# Patient Record
Sex: MALE | Race: WHITE | Employment: OTHER | URBAN - METROPOLITAN AREA
[De-identification: names, ages, dates, MRNs, and addresses within clinical notes are randomized per-mention and may not be internally consistent; named-entity substitution may affect disease eponyms.]

---

## 2019-02-08 ENCOUNTER — HOSPITAL ENCOUNTER (INPATIENT)
Facility: CLINIC | Age: 54
LOS: 51 days | Discharge: NURSING, REHAB OR LONG-TERM CARE | DRG: 885 | End: 2019-04-02
Attending: EMERGENCY MEDICINE | Admitting: PSYCHIATRY & NEUROLOGY
Payer: COMMERCIAL

## 2019-02-08 DIAGNOSIS — E11.8 TYPE 2 DIABETES MELLITUS WITH COMPLICATION, UNSPECIFIED WHETHER LONG TERM INSULIN USE: Primary | ICD-10-CM

## 2019-02-08 DIAGNOSIS — F20.9 SCHIZOPHRENIA, UNSPECIFIED TYPE (H): ICD-10-CM

## 2019-02-08 DIAGNOSIS — I10 ESSENTIAL HYPERTENSION: ICD-10-CM

## 2019-02-08 DIAGNOSIS — E78.5 HYPERLIPIDEMIA LDL GOAL <160: ICD-10-CM

## 2019-02-08 DIAGNOSIS — R45.1 AGITATION: ICD-10-CM

## 2019-02-08 LAB
ALBUMIN SERPL-MCNC: 3.6 G/DL (ref 3.4–5)
ALP SERPL-CCNC: 74 U/L (ref 40–150)
ALT SERPL W P-5'-P-CCNC: 36 U/L (ref 0–70)
AMPHETAMINES UR QL SCN: NEGATIVE
ANION GAP SERPL CALCULATED.3IONS-SCNC: 10 MMOL/L (ref 3–14)
AST SERPL W P-5'-P-CCNC: 39 U/L (ref 0–45)
BARBITURATES UR QL: NEGATIVE
BASOPHILS # BLD AUTO: 0 10E9/L (ref 0–0.2)
BASOPHILS NFR BLD AUTO: 0.3 %
BENZODIAZ UR QL: NEGATIVE
BILIRUB SERPL-MCNC: 0.2 MG/DL (ref 0.2–1.3)
BUN SERPL-MCNC: 7 MG/DL (ref 7–30)
CALCIUM SERPL-MCNC: 8.5 MG/DL (ref 8.5–10.1)
CANNABINOIDS UR QL SCN: NEGATIVE
CHLORIDE SERPL-SCNC: 108 MMOL/L (ref 94–109)
CO2 SERPL-SCNC: 22 MMOL/L (ref 20–32)
COCAINE UR QL: NEGATIVE
CREAT SERPL-MCNC: 0.68 MG/DL (ref 0.66–1.25)
DIFFERENTIAL METHOD BLD: ABNORMAL
EOSINOPHIL # BLD AUTO: 0 10E9/L (ref 0–0.7)
EOSINOPHIL NFR BLD AUTO: 0 %
ERYTHROCYTE [DISTWIDTH] IN BLOOD BY AUTOMATED COUNT: 13 % (ref 10–15)
ETHANOL UR QL SCN: NEGATIVE
GFR SERPL CREATININE-BSD FRML MDRD: >90 ML/MIN/{1.73_M2}
GLUCOSE BLDC GLUCOMTR-MCNC: 144 MG/DL (ref 70–99)
GLUCOSE SERPL-MCNC: 129 MG/DL (ref 70–99)
HCT VFR BLD AUTO: 38 % (ref 40–53)
HGB BLD-MCNC: 12.6 G/DL (ref 13.3–17.7)
IMM GRANULOCYTES # BLD: 0.1 10E9/L (ref 0–0.4)
IMM GRANULOCYTES NFR BLD: 0.4 %
LYMPHOCYTES # BLD AUTO: 3.8 10E9/L (ref 0.8–5.3)
LYMPHOCYTES NFR BLD AUTO: 30.9 %
MCH RBC QN AUTO: 30.1 PG (ref 26.5–33)
MCHC RBC AUTO-ENTMCNC: 33.2 G/DL (ref 31.5–36.5)
MCV RBC AUTO: 91 FL (ref 78–100)
MONOCYTES # BLD AUTO: 1.1 10E9/L (ref 0–1.3)
MONOCYTES NFR BLD AUTO: 9.1 %
NEUTROPHILS # BLD AUTO: 7.3 10E9/L (ref 1.6–8.3)
NEUTROPHILS NFR BLD AUTO: 59.3 %
NRBC # BLD AUTO: 0 10*3/UL
NRBC BLD AUTO-RTO: 0 /100
OPIATES UR QL SCN: NEGATIVE
PLATELET # BLD AUTO: 357 10E9/L (ref 150–450)
POTASSIUM SERPL-SCNC: 3.8 MMOL/L (ref 3.4–5.3)
PROT SERPL-MCNC: 7 G/DL (ref 6.8–8.8)
RBC # BLD AUTO: 4.19 10E12/L (ref 4.4–5.9)
SODIUM SERPL-SCNC: 140 MMOL/L (ref 133–144)
WBC # BLD AUTO: 12.3 10E9/L (ref 4–11)

## 2019-02-08 PROCEDURE — 85025 COMPLETE CBC W/AUTO DIFF WBC: CPT | Performed by: EMERGENCY MEDICINE

## 2019-02-08 PROCEDURE — A9270 NON-COVERED ITEM OR SERVICE: HCPCS | Mod: GY | Performed by: EMERGENCY MEDICINE

## 2019-02-08 PROCEDURE — 25000132 ZZH RX MED GY IP 250 OP 250 PS 637: Mod: GY | Performed by: EMERGENCY MEDICINE

## 2019-02-08 PROCEDURE — 80053 COMPREHEN METABOLIC PANEL: CPT | Performed by: EMERGENCY MEDICINE

## 2019-02-08 PROCEDURE — 90791 PSYCH DIAGNOSTIC EVALUATION: CPT

## 2019-02-08 PROCEDURE — 00000146 ZZHCL STATISTIC GLUCOSE BY METER IP

## 2019-02-08 PROCEDURE — 99285 EMERGENCY DEPT VISIT HI MDM: CPT | Mod: 25 | Performed by: EMERGENCY MEDICINE

## 2019-02-08 PROCEDURE — 99285 EMERGENCY DEPT VISIT HI MDM: CPT | Mod: Z6 | Performed by: EMERGENCY MEDICINE

## 2019-02-08 PROCEDURE — 80307 DRUG TEST PRSMV CHEM ANLYZR: CPT | Performed by: PSYCHIATRY & NEUROLOGY

## 2019-02-08 PROCEDURE — 80320 DRUG SCREEN QUANTALCOHOLS: CPT | Performed by: PSYCHIATRY & NEUROLOGY

## 2019-02-08 RX ORDER — CITALOPRAM HYDROBROMIDE 10 MG/1
20 TABLET ORAL DAILY
COMMUNITY
End: 2019-02-09

## 2019-02-08 RX ORDER — SIMVASTATIN 40 MG
40 TABLET ORAL AT BEDTIME
Status: ON HOLD | COMMUNITY
End: 2019-03-29

## 2019-02-08 RX ORDER — HALOPERIDOL 0.5 MG/1
0.5 TABLET ORAL DAILY
COMMUNITY
End: 2019-02-09

## 2019-02-08 RX ORDER — HALOPERIDOL 5 MG/1
5 TABLET ORAL 2 TIMES DAILY
Status: ON HOLD | COMMUNITY
End: 2019-03-29

## 2019-02-08 RX ORDER — PANTOPRAZOLE SODIUM 20 MG/1
20 TABLET, DELAYED RELEASE ORAL DAILY
Status: ON HOLD | COMMUNITY
End: 2019-08-27

## 2019-02-08 RX ORDER — IBUPROFEN 200 MG
200 TABLET ORAL EVERY 4 HOURS PRN
Status: ON HOLD | COMMUNITY
End: 2019-03-29

## 2019-02-08 RX ORDER — CHLORAL HYDRATE 500 MG
1 CAPSULE ORAL 2 TIMES DAILY
Status: ON HOLD | COMMUNITY
End: 2019-08-27

## 2019-02-08 RX ORDER — HALOPERIDOL 5 MG/1
5 TABLET ORAL ONCE
Status: COMPLETED | OUTPATIENT
Start: 2019-02-08 | End: 2019-02-08

## 2019-02-08 RX ORDER — FENOFIBRATE 160 MG/1
160 TABLET ORAL DAILY
Status: ON HOLD | COMMUNITY
End: 2019-03-29

## 2019-02-08 RX ORDER — CLOZAPINE 100 MG/1
200 TABLET ORAL EVERY MORNING
Status: ON HOLD | COMMUNITY
End: 2019-08-27

## 2019-02-08 RX ORDER — MULTIVITAMIN/IRON/FOLIC ACID 18MG-0.4MG
1 TABLET ORAL DAILY
Status: ON HOLD | COMMUNITY
End: 2019-08-27

## 2019-02-08 RX ORDER — NIACIN 500 MG/1
500 TABLET, EXTENDED RELEASE ORAL AT BEDTIME
COMMUNITY
End: 2019-06-26

## 2019-02-08 RX ORDER — CLOZAPINE 25 MG/1
50 TABLET ORAL AT BEDTIME
Status: ON HOLD | COMMUNITY
End: 2019-08-27

## 2019-02-08 RX ORDER — CLOZAPINE 100 MG/1
400 TABLET ORAL AT BEDTIME
Status: ON HOLD | COMMUNITY
End: 2019-08-27

## 2019-02-08 RX ORDER — MEDROXYPROGESTERONE ACETATE 150 MG/ML
300 INJECTION, SUSPENSION INTRAMUSCULAR
Status: ON HOLD | COMMUNITY
End: 2019-03-29

## 2019-02-08 RX ORDER — ESCITALOPRAM OXALATE 10 MG/1
20 TABLET ORAL DAILY
COMMUNITY
End: 2019-02-09

## 2019-02-08 RX ORDER — POLYETHYLENE GLYCOL 3350 17 G/17G
1 POWDER, FOR SOLUTION ORAL DAILY
Status: ON HOLD | COMMUNITY
End: 2019-08-27

## 2019-02-08 RX ORDER — ASPIRIN 81 MG
100 TABLET, DELAYED RELEASE (ENTERIC COATED) ORAL 2 TIMES DAILY
Status: ON HOLD | COMMUNITY
End: 2019-08-27

## 2019-02-08 RX ORDER — ASPIRIN 81 MG/1
81 TABLET, CHEWABLE ORAL ONCE
Status: COMPLETED | OUTPATIENT
Start: 2019-02-08 | End: 2019-02-08

## 2019-02-08 RX ORDER — PROPRANOLOL HYDROCHLORIDE 80 MG/1
80 TABLET ORAL DAILY
COMMUNITY
End: 2019-02-09

## 2019-02-08 RX ADMIN — ASPIRIN 81 MG CHEWABLE TABLET 81 MG: 81 TABLET CHEWABLE at 22:11

## 2019-02-08 RX ADMIN — HALOPERIDOL 5 MG: 5 TABLET ORAL at 18:21

## 2019-02-08 NOTE — ED NOTES
Bed: ED10  Expected date: 2/8/19  Expected time: 3:05 PM  Means of arrival: Ambulance  Comments:  [id number] 54yo male, mental health eval

## 2019-02-08 NOTE — ED NOTES
Group Home staff showed up asking about admission. Discussed with them that he would eventually be taken over to [facility] and it may be a while until any decisions have been made regarding admission or discharge. They were advised they can wait or leave. Still standing in the hallway outside room.

## 2019-02-09 LAB — GLUCOSE BLDC GLUCOMTR-MCNC: 126 MG/DL (ref 70–99)

## 2019-02-09 PROCEDURE — 25000132 ZZH RX MED GY IP 250 OP 250 PS 637: Mod: GY | Performed by: EMERGENCY MEDICINE

## 2019-02-09 PROCEDURE — 00000146 ZZHCL STATISTIC GLUCOSE BY METER IP

## 2019-02-09 PROCEDURE — A9270 NON-COVERED ITEM OR SERVICE: HCPCS | Mod: GY | Performed by: EMERGENCY MEDICINE

## 2019-02-09 RX ORDER — OLANZAPINE 10 MG/1
10 TABLET, ORALLY DISINTEGRATING ORAL ONCE
Status: COMPLETED | OUTPATIENT
Start: 2019-02-09 | End: 2019-02-09

## 2019-02-09 RX ORDER — CHOLECALCIFEROL (VITAMIN D3) 50 MCG
2000 TABLET ORAL DAILY
Status: ON HOLD | COMMUNITY
End: 2019-08-27

## 2019-02-09 RX ORDER — CLOZAPINE 100 MG/1
200 TABLET ORAL DAILY
Status: DISCONTINUED | OUTPATIENT
Start: 2019-02-09 | End: 2019-04-02 | Stop reason: HOSPADM

## 2019-02-09 RX ORDER — NIACIN 500 MG/1
500 TABLET, EXTENDED RELEASE ORAL AT BEDTIME
Status: DISCONTINUED | OUTPATIENT
Start: 2019-02-09 | End: 2019-04-02 | Stop reason: HOSPADM

## 2019-02-09 RX ORDER — FENOFIBRATE 160 MG/1
160 TABLET ORAL DAILY
Status: DISCONTINUED | OUTPATIENT
Start: 2019-02-10 | End: 2019-04-02 | Stop reason: HOSPADM

## 2019-02-09 RX ORDER — PANTOPRAZOLE SODIUM 20 MG/1
20 TABLET, DELAYED RELEASE ORAL DAILY
Status: DISCONTINUED | OUTPATIENT
Start: 2019-02-10 | End: 2019-04-02 | Stop reason: HOSPADM

## 2019-02-09 RX ORDER — SODIUM FLUORIDE 5 MG/ML
PASTE, DENTIFRICE DENTAL 2 TIMES DAILY
Status: ON HOLD | COMMUNITY
End: 2019-08-27

## 2019-02-09 RX ORDER — CLOZAPINE 25 MG/1
50 TABLET ORAL AT BEDTIME
Status: DISCONTINUED | OUTPATIENT
Start: 2019-02-09 | End: 2019-02-09

## 2019-02-09 RX ORDER — IBUPROFEN 200 MG
400 TABLET ORAL ONCE
Status: COMPLETED | OUTPATIENT
Start: 2019-02-09 | End: 2019-02-09

## 2019-02-09 RX ORDER — HALOPERIDOL 5 MG/1
2.5 TABLET ORAL DAILY
Status: ON HOLD | COMMUNITY
End: 2019-03-29

## 2019-02-09 RX ORDER — FENOFIBRATE 54 MG/1
54 TABLET ORAL DAILY
Status: DISCONTINUED | OUTPATIENT
Start: 2019-02-09 | End: 2019-02-09

## 2019-02-09 RX ORDER — ESCITALOPRAM OXALATE 10 MG/1
20 TABLET ORAL DAILY
Status: ON HOLD | COMMUNITY
End: 2019-03-29

## 2019-02-09 RX ORDER — HALOPERIDOL 0.5 MG/1
0.5 TABLET ORAL DAILY
Status: DISCONTINUED | OUTPATIENT
Start: 2019-02-09 | End: 2019-02-09

## 2019-02-09 RX ORDER — HALOPERIDOL 5 MG/1
5 TABLET ORAL 2 TIMES DAILY
Status: DISCONTINUED | OUTPATIENT
Start: 2019-02-09 | End: 2019-02-15

## 2019-02-09 RX ORDER — PROPRANOLOL HYDROCHLORIDE 80 MG/1
80 CAPSULE, EXTENDED RELEASE ORAL DAILY
Status: ON HOLD | COMMUNITY
End: 2019-03-29

## 2019-02-09 RX ORDER — PROPRANOLOL HYDROCHLORIDE 80 MG/1
80 CAPSULE, EXTENDED RELEASE ORAL DAILY
Status: DISCONTINUED | OUTPATIENT
Start: 2019-02-09 | End: 2019-04-02 | Stop reason: HOSPADM

## 2019-02-09 RX ORDER — ACETAMINOPHEN 325 MG/1
650 TABLET ORAL ONCE
Status: COMPLETED | OUTPATIENT
Start: 2019-02-09 | End: 2019-02-09

## 2019-02-09 RX ORDER — PANTOPRAZOLE SODIUM 40 MG/1
40 TABLET, DELAYED RELEASE ORAL DAILY
Status: DISCONTINUED | OUTPATIENT
Start: 2019-02-09 | End: 2019-02-09

## 2019-02-09 RX ORDER — CLOZAPINE 100 MG/1
400 TABLET ORAL AT BEDTIME
Status: DISCONTINUED | OUTPATIENT
Start: 2019-02-09 | End: 2019-02-09

## 2019-02-09 RX ORDER — SIMVASTATIN 40 MG
40 TABLET ORAL AT BEDTIME
Status: DISCONTINUED | OUTPATIENT
Start: 2019-02-09 | End: 2019-04-02 | Stop reason: HOSPADM

## 2019-02-09 RX ADMIN — Medication 10 MG: at 21:27

## 2019-02-09 RX ADMIN — METFORMIN HYDROCHLORIDE 1000 MG: 500 TABLET ORAL at 09:24

## 2019-02-09 RX ADMIN — CLOZAPINE 450 MG: 100 TABLET ORAL at 01:06

## 2019-02-09 RX ADMIN — HALOPERIDOL 5 MG: 5 TABLET ORAL at 19:50

## 2019-02-09 RX ADMIN — OLANZAPINE 10 MG: 10 TABLET, ORALLY DISINTEGRATING ORAL at 22:05

## 2019-02-09 RX ADMIN — HALOPERIDOL 5 MG: 5 TABLET ORAL at 07:00

## 2019-02-09 RX ADMIN — NIACIN 500 MG: 500 TABLET, EXTENDED RELEASE ORAL at 21:15

## 2019-02-09 RX ADMIN — SIMVASTATIN 40 MG: 40 TABLET, FILM COATED ORAL at 21:15

## 2019-02-09 RX ADMIN — ACETAMINOPHEN 650 MG: 325 TABLET, FILM COATED ORAL at 08:01

## 2019-02-09 RX ADMIN — SIMVASTATIN 40 MG: 40 TABLET, FILM COATED ORAL at 01:06

## 2019-02-09 RX ADMIN — Medication 2.5 MG: at 15:57

## 2019-02-09 RX ADMIN — Medication 10 MG: at 01:07

## 2019-02-09 RX ADMIN — CLOZAPINE 200 MG: 100 TABLET ORAL at 07:00

## 2019-02-09 RX ADMIN — METFORMIN HYDROCHLORIDE 1000 MG: 500 TABLET ORAL at 18:45

## 2019-02-09 RX ADMIN — PROPRANOLOL HYDROCHLORIDE 80 MG: 80 CAPSULE, EXTENDED RELEASE ORAL at 07:42

## 2019-02-09 RX ADMIN — NIACIN 500 MG: 500 TABLET, EXTENDED RELEASE ORAL at 01:06

## 2019-02-09 RX ADMIN — CLOZAPINE 450 MG: 100 TABLET ORAL at 21:27

## 2019-02-09 RX ADMIN — IBUPROFEN 400 MG: 200 TABLET, FILM COATED ORAL at 11:08

## 2019-02-09 RX ADMIN — FENOFIBRATE 54 MG: 54 TABLET ORAL at 07:40

## 2019-02-09 RX ADMIN — OLANZAPINE 10 MG: 10 TABLET, ORALLY DISINTEGRATING ORAL at 09:10

## 2019-02-09 RX ADMIN — PANTOPRAZOLE SODIUM 40 MG: 40 TABLET, DELAYED RELEASE ORAL at 07:43
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
## 2019-02-09 NOTE — ED NOTES
ED to Behavioral Floor Handoff    SITUATION  Brayden Adrian is a 53 year old male who speaks English and lives in a group home with others The patient arrived in the ED by ambulance from home with a complaint of Mental Health Problem (group home states patient is getting more aggressive towards staff and needs medications adjusted, called EMS. calm and cooperative with EMS/staff)  .The patient's current symptoms started/worsened 3 week(s) ago and during this time the symptoms have increased.   In the ED, pt was diagnosed with   Final diagnoses:   Agitation   Schizophrenia, unspecified type (H)        Initial vitals were: BP: 144/67  Pulse: 112  Temp: 96.8  F (36  C)  Resp: 18  SpO2: 97 %   --------  Is the patient diabetic? Yes   If yes, last blood glucose? 145     If yes, was this treated in the ED? --  --------  Is the patient inebriated (ETOH) No or Impaired on other substances? No  MSSA done? N/A  Last MSSA score: --    Were withdrawal symptoms treated? N/A  Does the patient have a seizure history? No. If yes, date of most recent seizure--  --------  Is the patient patient experiencing suicidal ideation? denies current or recent suicidal ideation     Homicidal ideation? denies current or recent homicidal ideation or behaviors.    Self-injurious behavior/urges? denies current or recent self injurious behavior or ideation.  ------  Was pt aggressive in the ED No  Was a code called No  Is the pt now cooperative? Yes  -------  Meds given in ED:   Medications   haloperidol (HALDOL) tablet 5 mg (5 mg Oral Given 2/8/19 1821)      Family present during ED course? No  Family currently present? No    BACKGROUND  Does the patient have a cognitive impairment or developmental disability? Yes  Allergies: Not on File.   Social demographics are   Social History     Socioeconomic History     Marital status: Single     Spouse name: None     Number of children: None     Years of education: None     Highest education level: None    Social Needs     Financial resource strain: None     Food insecurity - worry: None     Food insecurity - inability: None     Transportation needs - medical: None     Transportation needs - non-medical: None   Occupational History     None   Tobacco Use     Smoking status: None   Substance and Sexual Activity     Alcohol use: None     Drug use: None     Sexual activity: None   Other Topics Concern     None   Social History Narrative     None        ASSESSMENT  Labs results   Labs Ordered and Resulted from Time of ED Arrival Up to the Time of Departure from the ED   GLUCOSE BY METER - Abnormal; Notable for the following components:       Result Value    Glucose 144 (*)     All other components within normal limits   DRUG ABUSE SCREEN 6 CHEM DEP URINE ([facility])   GLUCOSE MONITOR NURSING POCT      Imaging Studies: No results found for this or any previous visit (from the past 24 hour(s)).   Most recent vital signs /67   Pulse 112   Temp 96.8  F (36  C) (Oral)   Resp 18   SpO2 97%    Abnormal labs/tests/findings requiring intervention:---   Pain control: pt had none  Nausea control: pt had none    RECOMMENDATION  Are any infection precautions needed (MRSA, VRE, etc.)? No If yes, what infection? --  ---  Does the patient have mobility issues? independently. If yes, what device does the pt use? ---  ---  Is patient on 72 hour hold or commitment? No If on 72 hour hold, have hold and rights been given to patient? N/A  Are admitting orders written if after 10 p.m. ?Yes  Tasks needing to be completed:---     Emily Hale    [phone] [facility]

## 2019-02-09 NOTE — ED NOTES
"Pt was about to transfer to [facility] when he abruptly got out the wheelchair and laid back down in bed.  Pt requesting aspirin.  Staff from  reported to this staff that this is the pattern pt has before he becomes aggressive.  Pt was asked if he would also take something to help him feel more calm and he said \"yes\".  RN informed.    "

## 2019-02-09 NOTE — ED NOTES
Pt jumped off his cart quickly and came toward the RN with hands extended like he was going to go for the throat.  MD updated that pt's AM meds should be working by now.

## 2019-02-09 NOTE — ED NOTES
Patient signed out to me by my partner awaiting psychiatric bed assignment.  Intake was able to find a bed for the patient in our hospital, however, the admitting psychiatrist requested the patient to be placed on 72-hour hold so we can get appropriate evaluation throughout his stay on the psychiatric unit.  Patient was placed on 72-hour hold.  He was also given his prescribed dose of oral gabapentin.  There were no acute events throughout my shift.     Darron Rordiguez MD  02/09/19 4056

## 2019-02-09 NOTE — PHARMACY-ADMISSION MEDICATION HISTORY
Admission medication history interview status for the 2/8/2019 admission is complete. See Epic admission navigator for allergy information, pharmacy, prior to admission medications and immunization status.     Medication history interview sources:  [facility]    Changes made to PTA medication list (reason)  Added: Diaderm foot cream, SF 5000 plus dental cream  Deleted: citalopram (uses escitalopram), carbamide peroxide ear drops (DC'd by provider)  Changed: Docusate daily-->BID, fenofibrate 54 mg --> 160 mg, fish oil 2 g --> 1 g, haloperidol 0.5 mg @1400 --> 2.5 mg @1600, depo-provera 150 mg --> 300 mg, pantoprazole 40 mg --> 20 mg, vitamin D 1000 unit(s) tablets --> vitamin D 2000 unit(s) tablets.    Additional medication history information (including reliability of information, actions taken by pharmacist):   1. Patient has received the influenza vaccine for the current flu season.   2. Patient is due for depo-provera 2/11/19.         Prior to Admission medications    Medication Sig Last Dose Taking? Auth Provider   cloZAPine (CLOZARIL) 100 MG tablet Take 200 mg by mouth every morning  2/8/2019 at AM Yes Reported, Patient   cloZAPine (CLOZARIL) 100 MG tablet Take 400 mg by mouth At Bedtime  2/7/2019 at HS Yes Reported, Patient   cloZAPine (CLOZARIL) 25 MG tablet Take 50 mg by mouth At Bedtime 2/7/2019 at HS Yes Reported, Patient   docusate sodium (COLACE) 100 MG tablet Take 100 mg by mouth 2 times daily  2/8/2019 at AM Yes Reported, Patient   escitalopram (LEXAPRO) 10 MG tablet Take 20 mg by mouth daily 2/8/2019 at AM Yes Unknown, Entered By History   fenofibrate (TRIGLIDE/LOFIBRA) 160 MG tablet Take 160 mg by mouth daily  2/7/2019 at HS Yes Reported, Patient   fish oil-omega-3 fatty acids 1000 MG capsule Take 1 g by mouth 2 times daily  2/8/2019 at AM Yes Reported, Patient   haloperidol (HALDOL) 5 MG tablet Take 2.5 mg by mouth daily At 4 pm 2/7/2019 at 1600 Yes Unknown, Entered By History   haloperidol  (HALDOL) 5 MG tablet Take 5 mg by mouth 2 times daily  2/8/2019 at AM Yes Reported, Patient   ibuprofen (ADVIL/MOTRIN) 200 MG tablet Take 200 mg by mouth every 4 hours as needed for mild pain 2/8/2019 at AM Yes Reported, Patient   medroxyPROGESTERone (DEPO-PROVERA) 150 MG/ML IM injection Inject 300 mg into the muscle every 28 days  DUE 2/11/19 Yes Reported, Patient   melatonin 5 MG tablet Take 10 mg by mouth nightly as needed for sleep 2/7/2019 at HS Yes Reported, Patient   metFORMIN (GLUCOPHAGE) 1000 MG tablet Take 1,000 mg by mouth 2 times daily (with meals) 2/8/2019 at AM Yes Reported, Patient   Multiple Vitamins-Minerals (CENTROVITE) TABS Take 1 tablet by mouth daily  2/8/2019 at AM Yes Reported, Patient   niacin ER (NIASPAN) 500 MG CR tablet Take 500 mg by mouth At Bedtime 2/7/2019 at HS Yes Reported, Patient   pantoprazole (PROTONIX) 20 MG EC tablet Take 20 mg by mouth daily  2/8/2019 at AM Yes Reported, Patient   Podiatric Products (DIADERM FOOT REJUVENATING) CREA Externally apply topically daily 2/8/2019 at AM Yes Unknown, Entered By History   polyethylene glycol (MIRALAX/GLYCOLAX) packet Take 1 packet by mouth daily 2/8/2019 at AM Yes Reported, Patient   propranolol ER (INDERAL LA) 80 MG 24 hr capsule Take 80 mg by mouth daily 2/8/2019 at AM Yes Unknown, Entered By History   simvastatin (ZOCOR) 40 MG tablet Take 40 mg by mouth At Bedtime 2/7/2019 at HS Yes Reported, Patient   Sodium Fluoride (SF 5000 PLUS) 1.1 % CREA Apply to affected area 2 times daily 2/8/2019 at AM Yes Unknown, Entered By History   vitamin D3 (CHOLECALCIFEROL) 2000 units tablet Take 1 tablet by mouth daily 2/8/2019 at AM Yes Unknown, Entered By History       Medication history completed by: Marsha Hall, PharmD

## 2019-02-10 LAB
GLUCOSE BLDC GLUCOMTR-MCNC: 160 MG/DL (ref 70–99)
GLUCOSE BLDC GLUCOMTR-MCNC: 166 MG/DL (ref 70–99)

## 2019-02-10 PROCEDURE — A9270 NON-COVERED ITEM OR SERVICE: HCPCS | Mod: GY | Performed by: PSYCHIATRY & NEUROLOGY

## 2019-02-10 PROCEDURE — 00000146 ZZHCL STATISTIC GLUCOSE BY METER IP

## 2019-02-10 PROCEDURE — 12400001 ZZH R&B MH UMMC

## 2019-02-10 PROCEDURE — 25000132 ZZH RX MED GY IP 250 OP 250 PS 637: Mod: GY | Performed by: EMERGENCY MEDICINE

## 2019-02-10 PROCEDURE — A9270 NON-COVERED ITEM OR SERVICE: HCPCS | Mod: GY | Performed by: EMERGENCY MEDICINE

## 2019-02-10 PROCEDURE — 25000132 ZZH RX MED GY IP 250 OP 250 PS 637: Performed by: PSYCHIATRY & NEUROLOGY

## 2019-02-10 RX ORDER — OLANZAPINE 10 MG/1
10 TABLET ORAL
Status: DISCONTINUED | OUTPATIENT
Start: 2019-02-10 | End: 2019-04-02 | Stop reason: HOSPADM

## 2019-02-10 RX ORDER — HYDROXYZINE HYDROCHLORIDE 25 MG/1
25-50 TABLET, FILM COATED ORAL EVERY 4 HOURS PRN
Status: DISCONTINUED | OUTPATIENT
Start: 2019-02-10 | End: 2019-04-02 | Stop reason: HOSPADM

## 2019-02-10 RX ORDER — OLANZAPINE 10 MG/2ML
10 INJECTION, POWDER, FOR SOLUTION INTRAMUSCULAR
Status: DISCONTINUED | OUTPATIENT
Start: 2019-02-10 | End: 2019-04-02 | Stop reason: HOSPADM

## 2019-02-10 RX ORDER — ALUMINA, MAGNESIA, AND SIMETHICONE 2400; 2400; 240 MG/30ML; MG/30ML; MG/30ML
30 SUSPENSION ORAL EVERY 4 HOURS PRN
Status: DISCONTINUED | OUTPATIENT
Start: 2019-02-10 | End: 2019-04-02 | Stop reason: HOSPADM

## 2019-02-10 RX ORDER — ACETAMINOPHEN 325 MG/1
650 TABLET ORAL EVERY 4 HOURS PRN
Status: DISCONTINUED | OUTPATIENT
Start: 2019-02-10 | End: 2019-04-02 | Stop reason: HOSPADM

## 2019-02-10 RX ORDER — TRAZODONE HYDROCHLORIDE 50 MG/1
50 TABLET, FILM COATED ORAL
Status: DISCONTINUED | OUTPATIENT
Start: 2019-02-10 | End: 2019-04-02 | Stop reason: HOSPADM

## 2019-02-10 RX ORDER — ESCITALOPRAM OXALATE 20 MG/1
20 TABLET ORAL DAILY
Status: DISCONTINUED | OUTPATIENT
Start: 2019-02-10 | End: 2019-04-02 | Stop reason: HOSPADM

## 2019-02-10 RX ORDER — BISACODYL 10 MG
10 SUPPOSITORY, RECTAL RECTAL DAILY PRN
Status: DISCONTINUED | OUTPATIENT
Start: 2019-02-10 | End: 2019-04-02 | Stop reason: HOSPADM

## 2019-02-10 RX ORDER — OLANZAPINE 10 MG/1
10 TABLET, ORALLY DISINTEGRATING ORAL ONCE
Status: COMPLETED | OUTPATIENT
Start: 2019-02-10 | End: 2019-02-10

## 2019-02-10 RX ADMIN — HALOPERIDOL 5 MG: 5 TABLET ORAL at 08:26

## 2019-02-10 RX ADMIN — CLOZAPINE 450 MG: 100 TABLET ORAL at 19:44

## 2019-02-10 RX ADMIN — OLANZAPINE 10 MG: 10 TABLET, ORALLY DISINTEGRATING ORAL at 09:16

## 2019-02-10 RX ADMIN — SIMVASTATIN 40 MG: 40 TABLET, FILM COATED ORAL at 19:44

## 2019-02-10 RX ADMIN — PROPRANOLOL HYDROCHLORIDE 80 MG: 80 CAPSULE, EXTENDED RELEASE ORAL at 08:30

## 2019-02-10 RX ADMIN — ESCITALOPRAM 20 MG: 20 TABLET, FILM COATED ORAL at 13:15

## 2019-02-10 RX ADMIN — Medication 2.5 MG: at 16:04

## 2019-02-10 RX ADMIN — ACETAMINOPHEN 650 MG: 325 TABLET, FILM COATED ORAL at 16:04

## 2019-02-10 RX ADMIN — METFORMIN HYDROCHLORIDE 1000 MG: 500 TABLET ORAL at 18:05

## 2019-02-10 RX ADMIN — NIACIN 500 MG: 500 TABLET, EXTENDED RELEASE ORAL at 19:45

## 2019-02-10 RX ADMIN — CLOZAPINE 200 MG: 100 TABLET ORAL at 08:22

## 2019-02-10 RX ADMIN — HALOPERIDOL 5 MG: 5 TABLET ORAL at 19:44

## 2019-02-10 RX ADMIN — PANTOPRAZOLE SODIUM 20 MG: 20 TABLET, DELAYED RELEASE ORAL at 08:28

## 2019-02-10 RX ADMIN — METFORMIN HYDROCHLORIDE 1000 MG: 500 TABLET ORAL at 08:18

## 2019-02-10 RX ADMIN — FENOFIBRATE 160 MG: 160 TABLET, FILM COATED ORAL at 08:24

## 2019-02-10 ASSESSMENT — ACTIVITIES OF DAILY LIVING (ADL)
HYGIENE/GROOMING: INDEPENDENT
TRANSFERRING: 0-->INDEPENDENT
RETIRED_EATING: 0-->INDEPENDENT
WHICH_OF_THE_ABOVE_FUNCTIONAL_RISKS_HAD_A_RECENT_ONSET_OR_CHANGE?: COGNITION
COGNITION: 2 - DIFFICULTY WITH ORGANIZING THOUGHTS
DRESS: SCRUBS (BEHAVIORAL HEALTH)
SWALLOWING: 0-->SWALLOWS FOODS/LIQUIDS WITHOUT DIFFICULTY
FALL_HISTORY_WITHIN_LAST_SIX_MONTHS: NO
RETIRED_COMMUNICATION: 0-->UNDERSTANDS/COMMUNICATES WITHOUT DIFFICULTY
DRESS: 0-->INDEPENDENT
LAUNDRY: UNABLE TO COMPLETE
TOILETING: 0-->INDEPENDENT
ORAL_HYGIENE: INDEPENDENT
BATHING: 0-->INDEPENDENT
AMBULATION: 0-->INDEPENDENT

## 2019-02-10 NOTE — PROGRESS NOTES
02/10/19 1415   Valuables   Patient Belongings locker;other (see comments)  (pt has eye glasses on his person.)   Patient Belongings Put in Hospital Secure Location (Security or Locker, etc.) clothing;glasses;other (see comments);shoes  (pt has a pair of black colored gloves and a black colored hat and a red colored scarf)   Did you bring any home meds/supplements to the hospital?  No         -items placed in pt. Locker-  1 x black colored hat  1 x grey jacket  1 x pair of black colored gloves  1 x black colored t- shirt  1 x red colored scarf  1 x pair of blue colored shoes  1 x pair of white ankle socks  1 x pair of blue jeans  1 x     -items kept with pt.-   1 x pair of eye glasses    Disclaimer- pt had no items of value or cash or identification on his person at this time.            A               Admission:  I am responsible for any personal items that are not sent to the safe or pharmacy.  [facility] is not responsible for loss, theft or damage of any property in my possession.    Signature:  _________________________________ Date: _______  Time: _____                                              Staff Signature:  ____________________________ Date: ________  Time: _____      2nd Staff person, if patient is unable/unwilling to sign:    Signature: ________________________________ Date: ________  Time: _____     Discharge:  [facility] has returned all of my personal belongings:    Signature: _________________________________ Date: ________  Time: _____                                          Staff Signature:  ____________________________ Date: ________  Time: _____

## 2019-02-10 NOTE — PROGRESS NOTES
Was able to contact Guardian Jose Adrian [phone], who gave verbal consent for treatment, via phone with two witnesses.
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
## 2019-02-11 LAB — GLUCOSE SERPL-MCNC: 195 MG/DL (ref 70–99)

## 2019-02-11 PROCEDURE — 99223 1ST HOSP IP/OBS HIGH 75: CPT | Mod: AI | Performed by: PSYCHIATRY & NEUROLOGY

## 2019-02-11 PROCEDURE — 82947 ASSAY GLUCOSE BLOOD QUANT: CPT | Performed by: PSYCHIATRY & NEUROLOGY

## 2019-02-11 PROCEDURE — 25000132 ZZH RX MED GY IP 250 OP 250 PS 637: Performed by: EMERGENCY MEDICINE

## 2019-02-11 PROCEDURE — 36415 COLL VENOUS BLD VENIPUNCTURE: CPT | Performed by: PSYCHIATRY & NEUROLOGY

## 2019-02-11 PROCEDURE — A9270 NON-COVERED ITEM OR SERVICE: HCPCS | Mod: GY | Performed by: EMERGENCY MEDICINE

## 2019-02-11 PROCEDURE — 12400001 ZZH R&B MH UMMC

## 2019-02-11 RX ADMIN — HALOPERIDOL 5 MG: 5 TABLET ORAL at 08:11

## 2019-02-11 RX ADMIN — PANTOPRAZOLE SODIUM 20 MG: 20 TABLET, DELAYED RELEASE ORAL at 08:10

## 2019-02-11 RX ADMIN — SIMVASTATIN 40 MG: 40 TABLET, FILM COATED ORAL at 19:07

## 2019-02-11 RX ADMIN — Medication 2.5 MG: at 17:19

## 2019-02-11 RX ADMIN — ESCITALOPRAM 20 MG: 20 TABLET, FILM COATED ORAL at 08:11

## 2019-02-11 RX ADMIN — PROPRANOLOL HYDROCHLORIDE 80 MG: 80 CAPSULE, EXTENDED RELEASE ORAL at 08:11

## 2019-02-11 RX ADMIN — METFORMIN HYDROCHLORIDE 1000 MG: 500 TABLET ORAL at 08:11

## 2019-02-11 RX ADMIN — FENOFIBRATE 160 MG: 160 TABLET, FILM COATED ORAL at 08:10

## 2019-02-11 RX ADMIN — CLOZAPINE 200 MG: 100 TABLET ORAL at 08:11

## 2019-02-11 RX ADMIN — METFORMIN HYDROCHLORIDE 1000 MG: 500 TABLET ORAL at 17:21

## 2019-02-11 RX ADMIN — CLOZAPINE 450 MG: 100 TABLET ORAL at 19:07

## 2019-02-11 RX ADMIN — HALOPERIDOL 5 MG: 5 TABLET ORAL at 19:06

## 2019-02-11 RX ADMIN — NIACIN 500 MG: 500 TABLET, EXTENDED RELEASE ORAL at 19:07

## 2019-02-11 ASSESSMENT — ACTIVITIES OF DAILY LIVING (ADL)
DRESS: SCRUBS (BEHAVIORAL HEALTH);INDEPENDENT
LAUNDRY: UNABLE TO COMPLETE
LAUNDRY: UNABLE TO COMPLETE
HYGIENE/GROOMING: INDEPENDENT;PROMPTS
DRESS: SCRUBS (BEHAVIORAL HEALTH)
ORAL_HYGIENE: INDEPENDENT
HYGIENE/GROOMING: INDEPENDENT
ORAL_HYGIENE: INDEPENDENT
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
## 2019-02-11 NOTE — PROGRESS NOTES
"Pt refused his blood sugar assessment this AM. Pt mostly made nonsensical statements when explaining why he didn't want to do it. Including \"I got  a half hour ago to a Pizza, so I'm good.\"  "

## 2019-02-11 NOTE — PLAN OF CARE
BEHAVIORAL TEAM DISCUSSION    Participants: AYALA Cedeño, Osbaldo rae RN, Cayetano Gore MD  Progress: Minimal, just admitted very ill  Continued Stay Criteria/Rationale: pt is psychotic, delusional, and disorganized.  Medical/Physical: per Internal Medicine consult  Precautions:   Behavioral Orders   Procedures     Assault precautions     Code 1 - Restrict to Unit     Routine Programming     As clinically indicated     Sexual precautions     Status 15     Every 15 minutes.     Status Individual Observation     Order Specific Question:   CONTINUOUS 24 hours / day     Answer:   5 feet     Order Specific Question:   Indications for SIO     Answer:   Assault risk     Suicide precautions     Patients on Suicide Precautions should have a Combination Diet ordered that includes a Diet selection(s) AND a Behavioral Tray selection for Safe Tray - with utensils, or Safe Tray - NO utensils       Plan: Psychiatric evaluation, medication management, Provide therapeutic milieu.  [facility] will work on aftercare planning.   Rationale for change in precautions or plan: No changes

## 2019-02-11 NOTE — PROGRESS NOTES
02/11/19 1524   Behavioral Health   Hallucinations denies / not responding to hallucinations   Thinking poor concentration;distractable;confused   Orientation person: oriented   Memory confabulation   Insight poor   Judgement impaired   Eye Contact at examiner   Affect blunted, flat   Mood mood is calm   Physical Appearance/Attire untidy;disheveled   Hygiene neglected grooming - unclean body, hair, teeth   Suicidality other (see comments)  (BRO)   1. Wish to be Dead (BRO)   2. Non-Specific Active Suicidal Thoughts  (BRO)   Self Injury other (see comment)  (BRO)   Elopement (none noted)   Activity withdrawn;isolative   Speech flight of ideas;pressured;rambling   Medication Sensitivity no stated side effects;no observed side effects   Psychomotor / Gait balanced;steady   Activities of Daily Living   Hygiene/Grooming independent   Oral Hygiene independent   Dress scrubs (behavioral health);independent   Laundry unable to complete   Room Organization independent     Pt remained in his room throughout the shift. During check-in, author asked about depression and anxiety, as well as SI and SIB. Pt's answers were nonsensical, often tangential and focused on Alevism topics. Pt never answered any of the questions asked of him. Pt was able to come to the door and ask for a cup of coffee, or understand a concrete question such as if he wanted any creamer with his coffee. No major concerns.

## 2019-02-12 LAB
GLUCOSE BLDC GLUCOMTR-MCNC: 164 MG/DL (ref 70–99)
GLUCOSE BLDC GLUCOMTR-MCNC: 194 MG/DL (ref 70–99)

## 2019-02-12 PROCEDURE — 25000132 ZZH RX MED GY IP 250 OP 250 PS 637: Performed by: EMERGENCY MEDICINE

## 2019-02-12 PROCEDURE — 12400001 ZZH R&B MH UMMC

## 2019-02-12 PROCEDURE — 25000132 ZZH RX MED GY IP 250 OP 250 PS 637: Performed by: PSYCHIATRY & NEUROLOGY

## 2019-02-12 PROCEDURE — 36415 COLL VENOUS BLD VENIPUNCTURE: CPT | Performed by: PSYCHIATRY & NEUROLOGY

## 2019-02-12 PROCEDURE — 00000146 ZZHCL STATISTIC GLUCOSE BY METER IP

## 2019-02-12 PROCEDURE — 80159 DRUG ASSAY CLOZAPINE: CPT | Performed by: PSYCHIATRY & NEUROLOGY

## 2019-02-12 RX ADMIN — PROPRANOLOL HYDROCHLORIDE 80 MG: 80 CAPSULE, EXTENDED RELEASE ORAL at 08:43

## 2019-02-12 RX ADMIN — PANTOPRAZOLE SODIUM 20 MG: 20 TABLET, DELAYED RELEASE ORAL at 08:44

## 2019-02-12 RX ADMIN — HALOPERIDOL 5 MG: 5 TABLET ORAL at 19:06

## 2019-02-12 RX ADMIN — CLOZAPINE 200 MG: 100 TABLET ORAL at 08:44

## 2019-02-12 RX ADMIN — METFORMIN HYDROCHLORIDE 1000 MG: 500 TABLET ORAL at 17:04

## 2019-02-12 RX ADMIN — ESCITALOPRAM 20 MG: 20 TABLET, FILM COATED ORAL at 08:44

## 2019-02-12 RX ADMIN — METFORMIN HYDROCHLORIDE 1000 MG: 500 TABLET ORAL at 08:44

## 2019-02-12 RX ADMIN — FENOFIBRATE 160 MG: 160 TABLET, FILM COATED ORAL at 08:44

## 2019-02-12 RX ADMIN — CLOZAPINE 450 MG: 100 TABLET ORAL at 19:06

## 2019-02-12 RX ADMIN — SIMVASTATIN 40 MG: 40 TABLET, FILM COATED ORAL at 19:06

## 2019-02-12 RX ADMIN — HYDROXYZINE HYDROCHLORIDE 50 MG: 25 TABLET ORAL at 17:04

## 2019-02-12 RX ADMIN — ACETAMINOPHEN 650 MG: 325 TABLET, FILM COATED ORAL at 09:25

## 2019-02-12 RX ADMIN — HALOPERIDOL 5 MG: 5 TABLET ORAL at 08:44

## 2019-02-12 RX ADMIN — OLANZAPINE 10 MG: 10 TABLET, FILM COATED ORAL at 10:12

## 2019-02-12 RX ADMIN — Medication 2.5 MG: at 17:03

## 2019-02-12 RX ADMIN — NIACIN 500 MG: 500 TABLET, EXTENDED RELEASE ORAL at 19:06

## 2019-02-12 ASSESSMENT — ACTIVITIES OF DAILY LIVING (ADL)
ORAL_HYGIENE: INDEPENDENT
ORAL_HYGIENE: INDEPENDENT
DRESS: SCRUBS (BEHAVIORAL HEALTH)
LAUNDRY: UNABLE TO COMPLETE
DRESS: SCRUBS (BEHAVIORAL HEALTH);INDEPENDENT
LAUNDRY: UNABLE TO COMPLETE
HYGIENE/GROOMING: INDEPENDENT
HYGIENE/GROOMING: INDEPENDENT

## 2019-02-12 NOTE — PROGRESS NOTES
"   02/12/19 1633   Behavioral Health   Hallucinations denies / not responding to hallucinations   Thinking poor concentration;distractable;delusional   Orientation person: oriented   Memory short term   Insight poor   Judgement impaired   Eye Contact at examiner   Affect tense;blunted, flat;irritable   Mood labile   Physical Appearance/Attire untidy;disheveled   Hygiene neglected grooming - unclean body, hair, teeth   Suicidality other (see comments)  (BRO)   1. Wish to be Dead (BRO)   2. Non-Specific Active Suicidal Thoughts  (BRO)   Self Injury other (see comment)  (BRO)   Elopement (none noted)   Activity withdrawn;isolative   Speech clear;flight of ideas;rambling   Medication Sensitivity no stated side effects;no observed side effects   Psychomotor / Gait balanced;steady   Activities of Daily Living   Hygiene/Grooming independent   Oral Hygiene independent   Dress scrubs (behavioral health);independent   Laundry unable to complete   Room Organization independent     Pt remained isolated and withdrawn. Pt had an aggressive episode today, see RN note. When asked about SI and SIB, pt spoke about things not at all related to the question. When asked about HI, pt responded \"nope\". When asked if he was having thoughts about children, pt responded \"Yup\".  "
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
## 2019-02-13 LAB — GLUCOSE BLDC GLUCOMTR-MCNC: 163 MG/DL (ref 70–99)

## 2019-02-13 PROCEDURE — 99233 SBSQ HOSP IP/OBS HIGH 50: CPT | Performed by: PSYCHIATRY & NEUROLOGY

## 2019-02-13 PROCEDURE — 25000132 ZZH RX MED GY IP 250 OP 250 PS 637: Performed by: PSYCHIATRY & NEUROLOGY

## 2019-02-13 PROCEDURE — 25000132 ZZH RX MED GY IP 250 OP 250 PS 637: Performed by: EMERGENCY MEDICINE

## 2019-02-13 PROCEDURE — 12400001 ZZH R&B MH UMMC

## 2019-02-13 PROCEDURE — 00000146 ZZHCL STATISTIC GLUCOSE BY METER IP

## 2019-02-13 RX ADMIN — PANTOPRAZOLE SODIUM 20 MG: 20 TABLET, DELAYED RELEASE ORAL at 09:22

## 2019-02-13 RX ADMIN — METFORMIN HYDROCHLORIDE 1000 MG: 500 TABLET ORAL at 09:22

## 2019-02-13 RX ADMIN — Medication 2.5 MG: at 17:16

## 2019-02-13 RX ADMIN — NIACIN 500 MG: 500 TABLET, EXTENDED RELEASE ORAL at 19:10

## 2019-02-13 RX ADMIN — PROPRANOLOL HYDROCHLORIDE 80 MG: 80 CAPSULE, EXTENDED RELEASE ORAL at 09:20

## 2019-02-13 RX ADMIN — METFORMIN HYDROCHLORIDE 1000 MG: 500 TABLET ORAL at 17:17

## 2019-02-13 RX ADMIN — FENOFIBRATE 160 MG: 160 TABLET, FILM COATED ORAL at 09:20

## 2019-02-13 RX ADMIN — ESCITALOPRAM 20 MG: 20 TABLET, FILM COATED ORAL at 09:21

## 2019-02-13 RX ADMIN — HALOPERIDOL 5 MG: 5 TABLET ORAL at 19:10

## 2019-02-13 RX ADMIN — HALOPERIDOL 5 MG: 5 TABLET ORAL at 09:21

## 2019-02-13 RX ADMIN — CLOZAPINE 450 MG: 100 TABLET ORAL at 19:10

## 2019-02-13 RX ADMIN — CLOZAPINE 200 MG: 100 TABLET ORAL at 09:21

## 2019-02-13 RX ADMIN — SIMVASTATIN 40 MG: 40 TABLET, FILM COATED ORAL at 19:10

## 2019-02-13 ASSESSMENT — ACTIVITIES OF DAILY LIVING (ADL)
DRESS: SCRUBS (BEHAVIORAL HEALTH)
ORAL_HYGIENE: INDEPENDENT
LAUNDRY: UNABLE TO COMPLETE
HYGIENE/GROOMING: INDEPENDENT;PROMPTS
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
## 2019-02-14 LAB
GLUCOSE BLDC GLUCOMTR-MCNC: 122 MG/DL (ref 70–99)
GLUCOSE BLDC GLUCOMTR-MCNC: 181 MG/DL (ref 70–99)

## 2019-02-14 PROCEDURE — 12400001 ZZH R&B MH UMMC

## 2019-02-14 PROCEDURE — 25000132 ZZH RX MED GY IP 250 OP 250 PS 637: Performed by: PSYCHIATRY & NEUROLOGY

## 2019-02-14 PROCEDURE — 25000132 ZZH RX MED GY IP 250 OP 250 PS 637: Performed by: EMERGENCY MEDICINE

## 2019-02-14 PROCEDURE — 00000146 ZZHCL STATISTIC GLUCOSE BY METER IP

## 2019-02-14 PROCEDURE — 99232 SBSQ HOSP IP/OBS MODERATE 35: CPT | Performed by: PSYCHIATRY & NEUROLOGY

## 2019-02-14 RX ADMIN — HALOPERIDOL 5 MG: 5 TABLET ORAL at 20:14

## 2019-02-14 RX ADMIN — CLOZAPINE 200 MG: 100 TABLET ORAL at 08:20

## 2019-02-14 RX ADMIN — FENOFIBRATE 160 MG: 160 TABLET, FILM COATED ORAL at 08:19

## 2019-02-14 RX ADMIN — PROPRANOLOL HYDROCHLORIDE 80 MG: 80 CAPSULE, EXTENDED RELEASE ORAL at 08:19

## 2019-02-14 RX ADMIN — HALOPERIDOL 5 MG: 5 TABLET ORAL at 08:20

## 2019-02-14 RX ADMIN — CLOZAPINE 450 MG: 100 TABLET ORAL at 20:13

## 2019-02-14 RX ADMIN — NIACIN 500 MG: 500 TABLET, EXTENDED RELEASE ORAL at 20:14

## 2019-02-14 RX ADMIN — METFORMIN HYDROCHLORIDE 1000 MG: 500 TABLET ORAL at 08:20

## 2019-02-14 RX ADMIN — METFORMIN HYDROCHLORIDE 1000 MG: 500 TABLET ORAL at 18:12

## 2019-02-14 RX ADMIN — SIMVASTATIN 40 MG: 40 TABLET, FILM COATED ORAL at 20:14

## 2019-02-14 RX ADMIN — ESCITALOPRAM 20 MG: 20 TABLET, FILM COATED ORAL at 08:20

## 2019-02-14 RX ADMIN — PANTOPRAZOLE SODIUM 20 MG: 20 TABLET, DELAYED RELEASE ORAL at 08:19

## 2019-02-14 RX ADMIN — Medication 2.5 MG: at 16:39

## 2019-02-14 ASSESSMENT — ACTIVITIES OF DAILY LIVING (ADL)
HYGIENE/GROOMING: INDEPENDENT
LAUNDRY: UNABLE TO COMPLETE
DRESS: SCRUBS (BEHAVIORAL HEALTH)
ORAL_HYGIENE: INDEPENDENT
HYGIENE/GROOMING: HANDWASHING;SHOWER;INDEPENDENT
ORAL_HYGIENE: INDEPENDENT
DRESS: SCRUBS (BEHAVIORAL HEALTH);INDEPENDENT

## 2019-02-14 NOTE — PROGRESS NOTES
sought copy of guardianship papers after calling the guardian/father and then he directed me to talk to the group home ([facility]).  The copy was then faxed to me.
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
## 2019-02-14 NOTE — PLAN OF CARE
"Pt rambles, is disorganized, apologizes frequently. He mainly stays in his room. He showered this am, allowed blood sugar, vitals and took meds. He states his mood is \"pretty good.\" He said he is \"seeing visions.\" He endorses depression and anxiety-unable to rate. Pt said he does not want lunch; he did eat bkfst. His goal for the day is \"to eat all day and night.\" His am HR was 125; no somatic concerns voiced.    1436) Pt has remained in his room today, no aggression or inappropriate sexual behavior. He remains on sio.  "

## 2019-02-15 LAB
CLOZAPINE AND METABOLITES TOTAL: 1349 NG/ML
CLOZAPINE SERPL-MCNC: 591 NG/ML
CLOZAPINE-N-OXIDE QUANT: 156 NG/ML
GLUCOSE BLDC GLUCOMTR-MCNC: 154 MG/DL (ref 70–99)
GLUCOSE BLDC GLUCOMTR-MCNC: 154 MG/DL (ref 70–99)
NORCLOZAPINE SERPL-MCNC: 602 NG/ML

## 2019-02-15 PROCEDURE — 25000132 ZZH RX MED GY IP 250 OP 250 PS 637: Performed by: EMERGENCY MEDICINE

## 2019-02-15 PROCEDURE — 99232 SBSQ HOSP IP/OBS MODERATE 35: CPT | Performed by: PSYCHIATRY & NEUROLOGY

## 2019-02-15 PROCEDURE — 00000146 ZZHCL STATISTIC GLUCOSE BY METER IP

## 2019-02-15 PROCEDURE — 12400001 ZZH R&B MH UMMC

## 2019-02-15 PROCEDURE — 25000132 ZZH RX MED GY IP 250 OP 250 PS 637: Performed by: PSYCHIATRY & NEUROLOGY

## 2019-02-15 PROCEDURE — 25000128 H RX IP 250 OP 636: Performed by: PSYCHIATRY & NEUROLOGY

## 2019-02-15 RX ORDER — MEDROXYPROGESTERONE ACETATE 150 MG/ML
100 INJECTION, SUSPENSION INTRAMUSCULAR WEEKLY
Status: DISCONTINUED | OUTPATIENT
Start: 2019-02-15 | End: 2019-02-15

## 2019-02-15 RX ORDER — ARIPIPRAZOLE 5 MG/1
5 TABLET ORAL DAILY
Status: DISCONTINUED | OUTPATIENT
Start: 2019-02-16 | End: 2019-03-04

## 2019-02-15 RX ORDER — MEDROXYPROGESTERONE ACETATE 150 MG/ML
150 INJECTION, SUSPENSION INTRAMUSCULAR
Status: DISCONTINUED | OUTPATIENT
Start: 2019-02-15 | End: 2019-04-02 | Stop reason: HOSPADM

## 2019-02-15 RX ADMIN — PANTOPRAZOLE SODIUM 20 MG: 20 TABLET, DELAYED RELEASE ORAL at 08:28

## 2019-02-15 RX ADMIN — ACETAMINOPHEN 650 MG: 325 TABLET, FILM COATED ORAL at 04:25

## 2019-02-15 RX ADMIN — HALOPERIDOL 5 MG: 5 TABLET ORAL at 08:28

## 2019-02-15 RX ADMIN — MEDROXYPROGESTERONE ACETATE 150 MG: 150 INJECTION, SUSPENSION INTRAMUSCULAR at 18:25

## 2019-02-15 RX ADMIN — METFORMIN HYDROCHLORIDE 1000 MG: 500 TABLET ORAL at 08:27

## 2019-02-15 RX ADMIN — CLOZAPINE 200 MG: 100 TABLET ORAL at 08:26

## 2019-02-15 RX ADMIN — ACETAMINOPHEN 650 MG: 325 TABLET, FILM COATED ORAL at 14:32

## 2019-02-15 RX ADMIN — METFORMIN HYDROCHLORIDE 1000 MG: 500 TABLET ORAL at 18:04

## 2019-02-15 RX ADMIN — ESCITALOPRAM 20 MG: 20 TABLET, FILM COATED ORAL at 08:28

## 2019-02-15 RX ADMIN — SIMVASTATIN 40 MG: 40 TABLET, FILM COATED ORAL at 20:04

## 2019-02-15 RX ADMIN — NIACIN 500 MG: 500 TABLET, EXTENDED RELEASE ORAL at 20:04

## 2019-02-15 RX ADMIN — HYDROXYZINE HYDROCHLORIDE 50 MG: 25 TABLET ORAL at 04:25

## 2019-02-15 RX ADMIN — FENOFIBRATE 160 MG: 160 TABLET, FILM COATED ORAL at 08:28

## 2019-02-15 RX ADMIN — PROPRANOLOL HYDROCHLORIDE 80 MG: 80 CAPSULE, EXTENDED RELEASE ORAL at 08:28

## 2019-02-15 RX ADMIN — CLOZAPINE 450 MG: 100 TABLET ORAL at 20:03

## 2019-02-15 ASSESSMENT — ACTIVITIES OF DAILY LIVING (ADL)
DRESS: SCRUBS (BEHAVIORAL HEALTH);PROMPTS
HYGIENE/GROOMING: PROMPTS;INDEPENDENT;SHOWER
HYGIENE/GROOMING: PROMPTS;INDEPENDENT
DRESS: SCRUBS (BEHAVIORAL HEALTH)
LAUNDRY: UNABLE TO COMPLETE
ORAL_HYGIENE: PROMPTS;INDEPENDENT
ORAL_HYGIENE: PROMPTS;INDEPENDENT

## 2019-02-15 NOTE — PROGRESS NOTES
"   02/14/19 2148   Behavioral Health   Hallucinations denies / not responding to hallucinations   Thinking poor concentration;delusional;distractable   Orientation place: oriented;person: oriented   Memory baseline memory   Insight poor   Judgement impaired   Eye Contact at examiner   Affect blunted, flat   Mood irritable;mood is calm   Hygiene neglected grooming - unclean body, hair, teeth   1. Wish to be Dead No   2. Non-Specific Active Suicidal Thoughts  No   Activities of Daily Living   Hygiene/Grooming independent   Oral Hygiene independent   Dress scrubs (behavioral health)   Laundry unable to complete   Room Organization independent   Patient was isolative in his room during this shift. He did not eat dinner because \"the food here is not good.\" He had some visitors this evening, and they brought him food that he ate fine without any issues. He was overall calm during this shift.  He did not shower this evening.   "

## 2019-02-16 LAB
BASOPHILS # BLD AUTO: 0.1 10E9/L (ref 0–0.2)
BASOPHILS NFR BLD AUTO: 0.4 %
DIFFERENTIAL METHOD BLD: ABNORMAL
EOSINOPHIL # BLD AUTO: 0 10E9/L (ref 0–0.7)
EOSINOPHIL NFR BLD AUTO: 0.1 %
GLUCOSE BLDC GLUCOMTR-MCNC: 116 MG/DL (ref 70–99)
GLUCOSE BLDC GLUCOMTR-MCNC: 98 MG/DL (ref 70–99)
IMM GRANULOCYTES # BLD: 0 10E9/L (ref 0–0.4)
IMM GRANULOCYTES NFR BLD: 0.3 %
LYMPHOCYTES # BLD AUTO: 4 10E9/L (ref 0.8–5.3)
LYMPHOCYTES NFR BLD AUTO: 32 %
MONOCYTES # BLD AUTO: 1.4 10E9/L (ref 0–1.3)
MONOCYTES NFR BLD AUTO: 11.1 %
NEUTROPHILS # BLD AUTO: 7 10E9/L (ref 1.6–8.3)
NEUTROPHILS NFR BLD AUTO: 56.1 %
NRBC # BLD AUTO: 0 10*3/UL
NRBC BLD AUTO-RTO: 0 /100
WBC # BLD AUTO: 12.5 10E9/L (ref 4–11)

## 2019-02-16 PROCEDURE — 25000132 ZZH RX MED GY IP 250 OP 250 PS 637: Performed by: EMERGENCY MEDICINE

## 2019-02-16 PROCEDURE — 00000146 ZZHCL STATISTIC GLUCOSE BY METER IP

## 2019-02-16 PROCEDURE — 25000132 ZZH RX MED GY IP 250 OP 250 PS 637: Performed by: PSYCHIATRY & NEUROLOGY

## 2019-02-16 PROCEDURE — 85048 AUTOMATED LEUKOCYTE COUNT: CPT | Performed by: PSYCHIATRY & NEUROLOGY

## 2019-02-16 PROCEDURE — 12400001 ZZH R&B MH UMMC

## 2019-02-16 PROCEDURE — 36416 COLLJ CAPILLARY BLOOD SPEC: CPT | Performed by: PSYCHIATRY & NEUROLOGY

## 2019-02-16 PROCEDURE — 85004 AUTOMATED DIFF WBC COUNT: CPT | Performed by: PSYCHIATRY & NEUROLOGY

## 2019-02-16 RX ADMIN — PANTOPRAZOLE SODIUM 20 MG: 20 TABLET, DELAYED RELEASE ORAL at 08:47

## 2019-02-16 RX ADMIN — ESCITALOPRAM 20 MG: 20 TABLET, FILM COATED ORAL at 08:47

## 2019-02-16 RX ADMIN — METFORMIN HYDROCHLORIDE 1000 MG: 500 TABLET ORAL at 08:47

## 2019-02-16 RX ADMIN — ARIPIPRAZOLE 5 MG: 5 TABLET ORAL at 08:47

## 2019-02-16 RX ADMIN — CLOZAPINE 450 MG: 100 TABLET ORAL at 19:27

## 2019-02-16 RX ADMIN — PROPRANOLOL HYDROCHLORIDE 80 MG: 80 CAPSULE, EXTENDED RELEASE ORAL at 08:47

## 2019-02-16 RX ADMIN — METFORMIN HYDROCHLORIDE 1000 MG: 500 TABLET ORAL at 17:52

## 2019-02-16 RX ADMIN — SIMVASTATIN 40 MG: 40 TABLET, FILM COATED ORAL at 19:27

## 2019-02-16 RX ADMIN — NIACIN 500 MG: 500 TABLET, EXTENDED RELEASE ORAL at 19:27

## 2019-02-16 RX ADMIN — FENOFIBRATE 160 MG: 160 TABLET, FILM COATED ORAL at 08:47

## 2019-02-16 RX ADMIN — CLOZAPINE 200 MG: 100 TABLET ORAL at 08:47

## 2019-02-16 ASSESSMENT — ACTIVITIES OF DAILY LIVING (ADL)
DRESS: SCRUBS (BEHAVIORAL HEALTH)
HYGIENE/GROOMING: SHOWER;INDEPENDENT
DRESS: SCRUBS (BEHAVIORAL HEALTH);INDEPENDENT
LAUNDRY: UNABLE TO COMPLETE
ORAL_HYGIENE: INDEPENDENT
HYGIENE/GROOMING: INDEPENDENT
LAUNDRY: UNABLE TO COMPLETE
ORAL_HYGIENE: INDEPENDENT

## 2019-02-16 NOTE — PROGRESS NOTES
"Pt remained in room bedrest/isolative/guarded, pt's calm/polite/controlled behavior with no outburst, pt was up early took shower/ate breakfast, this writer introduced/greet pt for brief 1:1 asked how was day? Pt stated that \" my day was not good, my dad said don't touch little baby and stare at them \" pt denies of any psych-symptoms/SI/HI, pt refused lunch asked for snack time, pt needs are offered/known.     02/16/19 1400   Behavioral Health   Thoughts/Cognition (WDL) ex   Hallucinations denies / not responding to hallucinations   Thinking distractable   Orientation person: oriented;place: oriented   Memory baseline memory   Insight denial of illness   Judgement impaired   Eye Contact at examiner   Affect/Mood (WDL) ex   Affect blunted, flat   Mood mood is calm   ADL Assessment (WDL) WDL   Physical Appearance/Attire neat   Hygiene well groomed   Suicidality (WDL) WDL   1. Wish to be Dead No   2. Non-Specific Active Suicidal Thoughts  No   Self Injury other (see comment)  (none)   Elopement (WDL) WDL   Activity (WDL) WDL   Activity isolative   Speech (WDL) WDL   Speech clear;coherent   Medication Sensitivity (WDL) WDL   Medication Sensitivity no stated side effects;no observed side effects   Psychomotor Gait (WDL) WDL   Psychomotor / Gait balanced;steady   Overt Agression (WDL) WDL   Activities of Daily Living   Hygiene/Grooming shower;independent   Oral Hygiene independent   Dress scrubs (behavioral health);independent   Laundry unable to complete     "

## 2019-02-16 NOTE — PROGRESS NOTES
Pt spent the entire shift in his room. When talking to pt, pt responded with loose associations or tangential answers, never really giving an answer to many questions. Pt was prompted to take a shower; however, pt was in the shower and had gotten wet but was still malodorous when back in his room. Pt seemed confused throughout the shift.    Pt denied SI/SIB/HI and denied any AVH. Pt seemed wary of staff, seemed paranoid, and had delusional thoughts.     02/15/19 1500   Behavioral Health   Hallucinations denies / not responding to hallucinations   Thinking confused;delusional;paranoid;poor concentration   Orientation person: oriented   Memory other (see comment)  (brooks)   Insight poor   Judgement impaired   Eye Contact at examiner   Affect blunted, flat   Mood mood is calm;anxious   Physical Appearance/Attire attire appropriate to age and situation;appears stated age;untidy;disheveled   Hygiene neglected grooming - unclean body, hair, teeth;body odor   Suicidality other (see comments)  (denied)   1. Wish to be Dead No   2. Non-Specific Active Suicidal Thoughts  No   Self Injury other (see comment)  (denied; none observed)   Elopement (no concerns)   Activity isolative;withdrawn   Speech tangential;clear;other (see comments)  (loose associations)   Medication Sensitivity no stated side effects;no observed side effects   Psychomotor / Gait balanced;steady   Activities of Daily Living   Hygiene/Grooming prompts;independent;shower   Oral Hygiene prompts;independent   Dress scrubs (behavioral health);prompts   Room Organization prompts
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
## 2019-02-16 NOTE — PROGRESS NOTES
"Unable to obtain AM blood sugar as patient had already started eating his breakfast. Checked just before lunch time instead, sugar is 98. Pt refused his lunch despite prompting by staff. Told RN writer that he couldn't eat because, \"my teeth are too crooked.\" Pt had eaten breakfast this morning without issue.   "

## 2019-02-17 LAB
GLUCOSE BLDC GLUCOMTR-MCNC: 149 MG/DL (ref 70–99)
GLUCOSE BLDC GLUCOMTR-MCNC: 173 MG/DL (ref 70–99)

## 2019-02-17 PROCEDURE — 12400001 ZZH R&B MH UMMC

## 2019-02-17 PROCEDURE — 25000132 ZZH RX MED GY IP 250 OP 250 PS 637: Performed by: PSYCHIATRY & NEUROLOGY

## 2019-02-17 PROCEDURE — 25000132 ZZH RX MED GY IP 250 OP 250 PS 637: Performed by: EMERGENCY MEDICINE

## 2019-02-17 PROCEDURE — 00000146 ZZHCL STATISTIC GLUCOSE BY METER IP

## 2019-02-17 RX ADMIN — ESCITALOPRAM 20 MG: 20 TABLET, FILM COATED ORAL at 08:21

## 2019-02-17 RX ADMIN — CLOZAPINE 450 MG: 100 TABLET ORAL at 20:16

## 2019-02-17 RX ADMIN — PROPRANOLOL HYDROCHLORIDE 80 MG: 80 CAPSULE, EXTENDED RELEASE ORAL at 08:21

## 2019-02-17 RX ADMIN — SIMVASTATIN 40 MG: 40 TABLET, FILM COATED ORAL at 20:16

## 2019-02-17 RX ADMIN — METFORMIN HYDROCHLORIDE 1000 MG: 500 TABLET ORAL at 08:21

## 2019-02-17 RX ADMIN — NIACIN 500 MG: 500 TABLET, EXTENDED RELEASE ORAL at 20:16

## 2019-02-17 RX ADMIN — OLANZAPINE 10 MG: 10 TABLET, FILM COATED ORAL at 17:13

## 2019-02-17 RX ADMIN — ARIPIPRAZOLE 5 MG: 5 TABLET ORAL at 08:21

## 2019-02-17 RX ADMIN — PANTOPRAZOLE SODIUM 20 MG: 20 TABLET, DELAYED RELEASE ORAL at 08:21

## 2019-02-17 RX ADMIN — CLOZAPINE 200 MG: 100 TABLET ORAL at 08:21

## 2019-02-17 RX ADMIN — METFORMIN HYDROCHLORIDE 1000 MG: 500 TABLET ORAL at 17:13

## 2019-02-17 RX ADMIN — FENOFIBRATE 160 MG: 160 TABLET, FILM COATED ORAL at 08:21

## 2019-02-17 ASSESSMENT — ACTIVITIES OF DAILY LIVING (ADL)
LAUNDRY: UNABLE TO COMPLETE
DRESS: SCRUBS (BEHAVIORAL HEALTH);INDEPENDENT
HYGIENE/GROOMING: INDEPENDENT
DRESS: SCRUBS (BEHAVIORAL HEALTH)
ORAL_HYGIENE: INDEPENDENT
HYGIENE/GROOMING: INDEPENDENT
ORAL_HYGIENE: INDEPENDENT

## 2019-02-17 NOTE — PROGRESS NOTES
Pt isolative to room and withdrawn from staff and peers. PT denies SI, SIB, HI, AH, VH. No behavioral concerns to report this evening. Staff will continue to monitor closely.

## 2019-02-17 NOTE — PLAN OF CARE
Pt is showing significant improvement. Pt has no evidence of suicidal thoughts or behavior. Pt has been out in the milieu more this shift. Pt has been appropriate with minimal need for redirection except for a few inappropriate comments. Pt is not exhibiting aggressive behavior this shift. Pt reported no physical health concerns or side effects from medications this shift. Pt took all medications without issue. Pt's BG reading and VS were WDL this shift.
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
## 2019-02-18 LAB
GLUCOSE BLDC GLUCOMTR-MCNC: 135 MG/DL (ref 70–99)
GLUCOSE BLDC GLUCOMTR-MCNC: 93 MG/DL (ref 70–99)

## 2019-02-18 PROCEDURE — 12400001 ZZH R&B MH UMMC

## 2019-02-18 PROCEDURE — 25000132 ZZH RX MED GY IP 250 OP 250 PS 637: Performed by: PSYCHIATRY & NEUROLOGY

## 2019-02-18 PROCEDURE — 25000132 ZZH RX MED GY IP 250 OP 250 PS 637: Performed by: EMERGENCY MEDICINE

## 2019-02-18 PROCEDURE — 00000146 ZZHCL STATISTIC GLUCOSE BY METER IP

## 2019-02-18 PROCEDURE — 99232 SBSQ HOSP IP/OBS MODERATE 35: CPT | Performed by: PSYCHIATRY & NEUROLOGY

## 2019-02-18 RX ADMIN — FENOFIBRATE 160 MG: 160 TABLET, FILM COATED ORAL at 08:35

## 2019-02-18 RX ADMIN — ESCITALOPRAM 20 MG: 20 TABLET, FILM COATED ORAL at 08:35

## 2019-02-18 RX ADMIN — METFORMIN HYDROCHLORIDE 1000 MG: 500 TABLET ORAL at 08:35

## 2019-02-18 RX ADMIN — PANTOPRAZOLE SODIUM 20 MG: 20 TABLET, DELAYED RELEASE ORAL at 08:35

## 2019-02-18 RX ADMIN — METFORMIN HYDROCHLORIDE 1000 MG: 500 TABLET ORAL at 17:30

## 2019-02-18 RX ADMIN — CLOZAPINE 200 MG: 100 TABLET ORAL at 08:35

## 2019-02-18 RX ADMIN — SIMVASTATIN 40 MG: 40 TABLET, FILM COATED ORAL at 20:12

## 2019-02-18 RX ADMIN — CLOZAPINE 450 MG: 100 TABLET ORAL at 20:12

## 2019-02-18 RX ADMIN — PROPRANOLOL HYDROCHLORIDE 80 MG: 80 CAPSULE, EXTENDED RELEASE ORAL at 08:35

## 2019-02-18 RX ADMIN — ARIPIPRAZOLE 5 MG: 5 TABLET ORAL at 08:35

## 2019-02-18 RX ADMIN — NIACIN 500 MG: 500 TABLET, EXTENDED RELEASE ORAL at 20:12

## 2019-02-18 ASSESSMENT — ACTIVITIES OF DAILY LIVING (ADL)
HYGIENE/GROOMING: PROMPTS
ORAL_HYGIENE: PROMPTS
DRESS: SCRUBS (BEHAVIORAL HEALTH)
LAUNDRY: UNABLE TO COMPLETE

## 2019-02-18 NOTE — PROGRESS NOTES
02/17/19 2100   Behavioral Health   Hallucinations denies / not responding to hallucinations   Thinking poor concentration;distractable   Orientation person: oriented;place: oriented   Memory baseline memory   Insight poor   Judgement impaired   Eye Contact at examiner   Affect blunted, flat;tense   Mood mood is calm;labile   Physical Appearance/Attire untidy;disheveled   Hygiene neglected grooming - unclean body, hair, teeth   Suicidality other (see comments)  (denies)   1. Wish to be Dead No   2. Non-Specific Active Suicidal Thoughts  No   Self Injury other (see comment)  (BRO)   Elopement (none noted)   Activity withdrawn   Speech clear;coherent   Medication Sensitivity no stated side effects;no observed side effects   Psychomotor / Gait balanced;steady   Overt Aggression Scale   Verbal Aggression 0   Aggression against Property 0   Auto-Aggression 0   Physical Aggression 1   Overt Aggression Total Score 1   Activities of Daily Living   Hygiene/Grooming independent   Oral Hygiene independent   Dress scrubs (behavioral health);independent   Laundry unable to complete   Room Organization independent     Pt remained mostly isolative to his room, but was seen in the milieu for a bit during the shift. Pt denied SI. Pt endorsed having thoughts about children, and when asked about SIB, pt responded nonsensically about Anabaptist topics. Pt did have one episode of slight aggression, see RN note.

## 2019-02-19 LAB
GLUCOSE BLDC GLUCOMTR-MCNC: 113 MG/DL (ref 70–99)
GLUCOSE BLDC GLUCOMTR-MCNC: 154 MG/DL (ref 70–99)

## 2019-02-19 PROCEDURE — 12400001 ZZH R&B MH UMMC

## 2019-02-19 PROCEDURE — 25000132 ZZH RX MED GY IP 250 OP 250 PS 637: Performed by: EMERGENCY MEDICINE

## 2019-02-19 PROCEDURE — 25000132 ZZH RX MED GY IP 250 OP 250 PS 637: Performed by: PSYCHIATRY & NEUROLOGY

## 2019-02-19 PROCEDURE — 99232 SBSQ HOSP IP/OBS MODERATE 35: CPT | Performed by: PSYCHIATRY & NEUROLOGY

## 2019-02-19 PROCEDURE — 00000146 ZZHCL STATISTIC GLUCOSE BY METER IP

## 2019-02-19 RX ADMIN — METFORMIN HYDROCHLORIDE 1000 MG: 500 TABLET ORAL at 08:53

## 2019-02-19 RX ADMIN — CLOZAPINE 200 MG: 100 TABLET ORAL at 08:52

## 2019-02-19 RX ADMIN — PANTOPRAZOLE SODIUM 20 MG: 20 TABLET, DELAYED RELEASE ORAL at 08:53

## 2019-02-19 RX ADMIN — CLOZAPINE 450 MG: 100 TABLET ORAL at 20:35

## 2019-02-19 RX ADMIN — SIMVASTATIN 40 MG: 40 TABLET, FILM COATED ORAL at 19:50

## 2019-02-19 RX ADMIN — METFORMIN HYDROCHLORIDE 1000 MG: 500 TABLET ORAL at 17:55

## 2019-02-19 RX ADMIN — ARIPIPRAZOLE 5 MG: 5 TABLET ORAL at 08:53

## 2019-02-19 RX ADMIN — ESCITALOPRAM 20 MG: 20 TABLET, FILM COATED ORAL at 08:53

## 2019-02-19 RX ADMIN — FENOFIBRATE 160 MG: 160 TABLET, FILM COATED ORAL at 08:52

## 2019-02-19 RX ADMIN — NIACIN 500 MG: 500 TABLET, EXTENDED RELEASE ORAL at 19:50

## 2019-02-19 RX ADMIN — PROPRANOLOL HYDROCHLORIDE 80 MG: 80 CAPSULE, EXTENDED RELEASE ORAL at 08:53

## 2019-02-19 ASSESSMENT — ACTIVITIES OF DAILY LIVING (ADL)
DRESS: SCRUBS (BEHAVIORAL HEALTH);INDEPENDENT;PROMPTS
ORAL_HYGIENE: INDEPENDENT;PROMPTS
LAUNDRY: UNABLE TO COMPLETE
HYGIENE/GROOMING: INDEPENDENT;PROMPTS

## 2019-02-19 NOTE — PROGRESS NOTES
Pt came out of his room to the [facility] for a [place] period time and said he was very nervous and then went back into his room the rest of the shift. He is malodorous and does not want to shower.

## 2019-02-19 NOTE — PLAN OF CARE
BEHAVIORAL TEAM DISCUSSION    Participants:   derek marcelo rn, christi coleman [facility]  Progress: Little if any progress.    He is out in the lounge a little more (he had been isolating to his room.)   He remains paranoid, delusional, and confused.    He is refusing to shower and attend to ADL's.   He is sexually preoccupied at times.  Continued Stay Criteria/Rationale: due to the above  Medical/Physical: per internal medicine  Precautions:   Behavioral Orders   Procedures    Assault precautions    Code 1 - Restrict to Unit    Routine Programming     As clinically indicated    Sexual precautions    Status 15     Every 15 minutes.    Status Individual Observation     Order Specific Question:   CONTINUOUS 24 hours / day     Answer:   5 feet     Order Specific Question:   Indications for SIO     Answer:   Assault risk    Suicide precautions     Patients on Suicide Precautions should have a Combination Diet ordered that includes a Diet selection(s) AND a Behavioral Tray selection for Safe Tray - with utensils, or Safe Tray - NO utensils       Plan: Per dr bonilla.  Takes meds; needs more time to stabilize.  He lives at [facility] group home  (state operated)   Father is guardian.     Rationale for change in precautions or plan: no change at this time.

## 2019-02-19 NOTE — PLAN OF CARE
Behavioral Disturbance  2/18/2019 2035 - No Change by Carlos Melchor, RN     Patient continues on SIO due to aggressive behaviors towards staff.  Patient was isolative to his room.  He had a flat affect.  He reported he felt  ok  and  I did not get enough sleep last night, because I was paranoid someone was going to get me  and  There are evil people that live outside my room .  He reported his goal for tomorrow, is  To leave my bed . He denied AH and VH.  He had slow speech and pauses in responses; however, unclear if this was due to psychosis, or functioning level.  Patient refused to complete daily hygiene.      Patient was compliant with his HS medications.  He had no acute medical concerns, or medication side effects.  BG was 135.    Patient refused supper, but accepted snacks.

## 2019-02-20 LAB
GLUCOSE BLDC GLUCOMTR-MCNC: 141 MG/DL (ref 70–99)
GLUCOSE BLDC GLUCOMTR-MCNC: 152 MG/DL (ref 70–99)

## 2019-02-20 PROCEDURE — 25000132 ZZH RX MED GY IP 250 OP 250 PS 637: Performed by: EMERGENCY MEDICINE

## 2019-02-20 PROCEDURE — 25000132 ZZH RX MED GY IP 250 OP 250 PS 637: Performed by: PSYCHIATRY & NEUROLOGY

## 2019-02-20 PROCEDURE — 00000146 ZZHCL STATISTIC GLUCOSE BY METER IP

## 2019-02-20 PROCEDURE — 12400001 ZZH R&B MH UMMC

## 2019-02-20 RX ADMIN — PANTOPRAZOLE SODIUM 20 MG: 20 TABLET, DELAYED RELEASE ORAL at 08:41

## 2019-02-20 RX ADMIN — NIACIN 500 MG: 500 TABLET, EXTENDED RELEASE ORAL at 19:38

## 2019-02-20 RX ADMIN — HYDROXYZINE HYDROCHLORIDE 50 MG: 25 TABLET ORAL at 19:38

## 2019-02-20 RX ADMIN — ARIPIPRAZOLE 5 MG: 5 TABLET ORAL at 08:41

## 2019-02-20 RX ADMIN — METFORMIN HYDROCHLORIDE 1000 MG: 500 TABLET ORAL at 08:41

## 2019-02-20 RX ADMIN — CLOZAPINE 450 MG: 100 TABLET ORAL at 19:38

## 2019-02-20 RX ADMIN — SIMVASTATIN 40 MG: 40 TABLET, FILM COATED ORAL at 19:38

## 2019-02-20 RX ADMIN — ESCITALOPRAM 20 MG: 20 TABLET, FILM COATED ORAL at 08:41

## 2019-02-20 RX ADMIN — PROPRANOLOL HYDROCHLORIDE 80 MG: 80 CAPSULE, EXTENDED RELEASE ORAL at 08:41

## 2019-02-20 RX ADMIN — FENOFIBRATE 160 MG: 160 TABLET, FILM COATED ORAL at 08:41

## 2019-02-20 RX ADMIN — METFORMIN HYDROCHLORIDE 1000 MG: 500 TABLET ORAL at 18:23

## 2019-02-20 RX ADMIN — CLOZAPINE 200 MG: 100 TABLET ORAL at 08:41

## 2019-02-20 ASSESSMENT — ACTIVITIES OF DAILY LIVING (ADL)
DRESS: SCRUBS (BEHAVIORAL HEALTH)
HYGIENE/GROOMING: INDEPENDENT
LAUNDRY: UNABLE TO COMPLETE
ORAL_HYGIENE: INDEPENDENT
LAUNDRY: UNABLE TO COMPLETE
HYGIENE/GROOMING: INDEPENDENT
DRESS: SCRUBS (BEHAVIORAL HEALTH)
ORAL_HYGIENE: INDEPENDENT
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
## 2019-02-20 NOTE — PROGRESS NOTES
Pt sat in lounge throughout 2 hours of morning OT groups.  Declined invitations to participate in various activities, though was given positive feedback on being out of his room and being with others/having appropriate behaviors.    Occasionally checked in to see if pt would be interested in simple activities like games, coloring, word find puzzles, though declined everything.    Seemed to enjoy listening to music while it was on in the lounge.

## 2019-02-20 NOTE — PROGRESS NOTES
Pt was isolative to room beginning of evening shift, after room change to [facility] pt was visible most of the evening.  Affect appeared flat, mood anxious.  Was not social with peers, withdrawn and minimally social with staff.  Hygiene is poor, noticeable body odor; refused shower and hygiene products when offered by multiple staff.  Pt ate most of dinner, agreeable to vitals and room change without issue.  Pt denies psychotic symptoms, however this is difficult to assess as pt is isolative and speaks minimally.  No behavioral concerns this shift.     02/19/19 2100   Behavioral Health   Hallucinations denies / not responding to hallucinations;other (see comment)  (Difficult to assess, may be responding)   Thinking poor concentration   Orientation person: oriented;place: oriented   Insight poor   Judgement impaired   Eye Contact staring;out of corner of eyes   Affect blunted, flat;tense   Mood anxious   Physical Appearance/Attire disheveled   Hygiene body odor   1. Wish to be Dead No   2. Non-Specific Active Suicidal Thoughts  No   Self Injury other (see comment)  (pt denied)   Activity isolative;withdrawn   Speech coherent   Psychomotor / Gait slow;balanced;steady   Activities of Daily Living   Hygiene/Grooming independent;prompts   Oral Hygiene independent;prompts   Dress scrubs (behavioral health);independent;prompts   Laundry unable to complete   Room Organization prompts

## 2019-02-20 NOTE — PLAN OF CARE
"No major change in patient presentation. Patient somewhat visible in milieu, but does not engage. He sat in lounge during group, but said that he could not attend because he \"is too old.\" He has a blunted flat affect. No other concerns. Will continue to monitor and assess.   "

## 2019-02-21 LAB
GLUCOSE BLDC GLUCOMTR-MCNC: 146 MG/DL (ref 70–99)
GLUCOSE BLDC GLUCOMTR-MCNC: 177 MG/DL (ref 70–99)

## 2019-02-21 PROCEDURE — 25000132 ZZH RX MED GY IP 250 OP 250 PS 637: Performed by: PSYCHIATRY & NEUROLOGY

## 2019-02-21 PROCEDURE — 00000146 ZZHCL STATISTIC GLUCOSE BY METER IP

## 2019-02-21 PROCEDURE — 25000132 ZZH RX MED GY IP 250 OP 250 PS 637: Performed by: EMERGENCY MEDICINE

## 2019-02-21 PROCEDURE — 12400001 ZZH R&B MH UMMC

## 2019-02-21 PROCEDURE — 99232 SBSQ HOSP IP/OBS MODERATE 35: CPT | Performed by: PSYCHIATRY & NEUROLOGY

## 2019-02-21 RX ORDER — BENZTROPINE MESYLATE 0.5 MG/1
0.5 TABLET ORAL 2 TIMES DAILY PRN
Status: DISCONTINUED | OUTPATIENT
Start: 2019-02-21 | End: 2019-03-11

## 2019-02-21 RX ADMIN — CLOZAPINE 450 MG: 100 TABLET ORAL at 20:19

## 2019-02-21 RX ADMIN — PROPRANOLOL HYDROCHLORIDE 80 MG: 80 CAPSULE, EXTENDED RELEASE ORAL at 08:59

## 2019-02-21 RX ADMIN — PANTOPRAZOLE SODIUM 20 MG: 20 TABLET, DELAYED RELEASE ORAL at 08:59

## 2019-02-21 RX ADMIN — ARIPIPRAZOLE 5 MG: 5 TABLET ORAL at 09:00

## 2019-02-21 RX ADMIN — NIACIN 500 MG: 500 TABLET, EXTENDED RELEASE ORAL at 20:19

## 2019-02-21 RX ADMIN — ACETAMINOPHEN 650 MG: 325 TABLET, FILM COATED ORAL at 17:12

## 2019-02-21 RX ADMIN — CLOZAPINE 200 MG: 100 TABLET ORAL at 08:59

## 2019-02-21 RX ADMIN — METFORMIN HYDROCHLORIDE 1000 MG: 500 TABLET ORAL at 18:26

## 2019-02-21 RX ADMIN — FENOFIBRATE 160 MG: 160 TABLET, FILM COATED ORAL at 09:00

## 2019-02-21 RX ADMIN — METFORMIN HYDROCHLORIDE 1000 MG: 500 TABLET ORAL at 08:59

## 2019-02-21 RX ADMIN — ESCITALOPRAM 20 MG: 20 TABLET, FILM COATED ORAL at 09:00

## 2019-02-21 RX ADMIN — SIMVASTATIN 40 MG: 40 TABLET, FILM COATED ORAL at 20:19

## 2019-02-21 RX ADMIN — BENZTROPINE MESYLATE 0.5 MG: 0.5 TABLET ORAL at 16:45

## 2019-02-21 ASSESSMENT — ACTIVITIES OF DAILY LIVING (ADL)
DRESS: SCRUBS (BEHAVIORAL HEALTH)
LAUNDRY: UNABLE TO COMPLETE
ORAL_HYGIENE: INDEPENDENT
HYGIENE/GROOMING: INDEPENDENT
LAUNDRY: UNABLE TO COMPLETE
DRESS: SCRUBS (BEHAVIORAL HEALTH)
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
## 2019-02-21 NOTE — PROGRESS NOTES
Patient spent the better half of the shift out of his room making small delusional comments occasionally to both staff and other patients. All of his comments were harmless in nature. Patients appetite was good and he ate all of what came on both of his trays. Behavior was appropriate for the lounge with no outbursts.
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
## 2019-02-21 NOTE — PROGRESS NOTES
Patient was in room the whole shift with little contact with staff other than asking for coffee. Patient was tense during evening vitals and when nurse entered room later in the shift to offer medication and did state that patient was still tense.

## 2019-02-22 LAB
GLUCOSE BLDC GLUCOMTR-MCNC: 102 MG/DL (ref 70–99)
GLUCOSE BLDC GLUCOMTR-MCNC: 107 MG/DL (ref 70–99)

## 2019-02-22 PROCEDURE — 99232 SBSQ HOSP IP/OBS MODERATE 35: CPT | Performed by: PSYCHIATRY & NEUROLOGY

## 2019-02-22 PROCEDURE — 00000146 ZZHCL STATISTIC GLUCOSE BY METER IP

## 2019-02-22 PROCEDURE — 25000132 ZZH RX MED GY IP 250 OP 250 PS 637: Performed by: EMERGENCY MEDICINE

## 2019-02-22 PROCEDURE — 25000132 ZZH RX MED GY IP 250 OP 250 PS 637: Performed by: PSYCHIATRY & NEUROLOGY

## 2019-02-22 PROCEDURE — 12400001 ZZH R&B MH UMMC

## 2019-02-22 RX ADMIN — SIMVASTATIN 40 MG: 40 TABLET, FILM COATED ORAL at 20:04

## 2019-02-22 RX ADMIN — CLOZAPINE 200 MG: 100 TABLET ORAL at 08:18

## 2019-02-22 RX ADMIN — FENOFIBRATE 160 MG: 160 TABLET, FILM COATED ORAL at 08:18

## 2019-02-22 RX ADMIN — NIACIN 500 MG: 500 TABLET, EXTENDED RELEASE ORAL at 20:04

## 2019-02-22 RX ADMIN — ESCITALOPRAM 20 MG: 20 TABLET, FILM COATED ORAL at 08:19

## 2019-02-22 RX ADMIN — PANTOPRAZOLE SODIUM 20 MG: 20 TABLET, DELAYED RELEASE ORAL at 08:18

## 2019-02-22 RX ADMIN — ARIPIPRAZOLE 5 MG: 5 TABLET ORAL at 08:18

## 2019-02-22 RX ADMIN — METFORMIN HYDROCHLORIDE 1000 MG: 500 TABLET ORAL at 18:12

## 2019-02-22 RX ADMIN — CLOZAPINE 450 MG: 100 TABLET ORAL at 20:05

## 2019-02-22 RX ADMIN — PROPRANOLOL HYDROCHLORIDE 80 MG: 80 CAPSULE, EXTENDED RELEASE ORAL at 08:18

## 2019-02-22 RX ADMIN — METFORMIN HYDROCHLORIDE 1000 MG: 500 TABLET ORAL at 08:18
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
## 2019-02-22 NOTE — PROGRESS NOTES
"Patient was complaining of \"restlessness\".  Patient appeared restless.  Dr. Gilmore was notified (Levon had Dr. Barfield listed as \"off\") and an order for Cogentin 0.5 mg b.i.d. PRN was ordered.    Patient accepted the medication.  Within two hours, he reported a reduction in feeling \"restless\".      Patient's guardian was notified of the additional PRN medication and was given an update on the patient's status.  "
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
## 2019-02-22 NOTE — PROGRESS NOTES
Logan Florentino contacted Tiffanie at pt's group home.   She said she will let me know on Monday the 25th when she can come visit pt and then give us feedback on how she thinks he is doing.    (she didn't have her schedule available during this phone conversation today.)

## 2019-02-23 LAB
GLUCOSE BLDC GLUCOMTR-MCNC: 115 MG/DL (ref 70–99)
GLUCOSE BLDC GLUCOMTR-MCNC: 162 MG/DL (ref 70–99)

## 2019-02-23 PROCEDURE — 12400001 ZZH R&B MH UMMC

## 2019-02-23 PROCEDURE — 25000132 ZZH RX MED GY IP 250 OP 250 PS 637: Performed by: PSYCHIATRY & NEUROLOGY

## 2019-02-23 PROCEDURE — 00000146 ZZHCL STATISTIC GLUCOSE BY METER IP

## 2019-02-23 PROCEDURE — 25000132 ZZH RX MED GY IP 250 OP 250 PS 637: Performed by: EMERGENCY MEDICINE

## 2019-02-23 RX ADMIN — CLOZAPINE 200 MG: 100 TABLET ORAL at 08:26

## 2019-02-23 RX ADMIN — METFORMIN HYDROCHLORIDE 1000 MG: 500 TABLET ORAL at 18:11

## 2019-02-23 RX ADMIN — ARIPIPRAZOLE 5 MG: 5 TABLET ORAL at 08:26

## 2019-02-23 RX ADMIN — CLOZAPINE 450 MG: 100 TABLET ORAL at 19:25

## 2019-02-23 RX ADMIN — PANTOPRAZOLE SODIUM 20 MG: 20 TABLET, DELAYED RELEASE ORAL at 08:26

## 2019-02-23 RX ADMIN — METFORMIN HYDROCHLORIDE 1000 MG: 500 TABLET ORAL at 08:26

## 2019-02-23 RX ADMIN — BENZTROPINE MESYLATE 0.5 MG: 0.5 TABLET ORAL at 19:25

## 2019-02-23 RX ADMIN — NIACIN 500 MG: 500 TABLET, EXTENDED RELEASE ORAL at 19:26

## 2019-02-23 RX ADMIN — FENOFIBRATE 160 MG: 160 TABLET, FILM COATED ORAL at 08:26

## 2019-02-23 RX ADMIN — BENZTROPINE MESYLATE 0.5 MG: 0.5 TABLET ORAL at 13:32

## 2019-02-23 RX ADMIN — ESCITALOPRAM 20 MG: 20 TABLET, FILM COATED ORAL at 08:26

## 2019-02-23 RX ADMIN — PROPRANOLOL HYDROCHLORIDE 80 MG: 80 CAPSULE, EXTENDED RELEASE ORAL at 08:26

## 2019-02-23 RX ADMIN — SIMVASTATIN 40 MG: 40 TABLET, FILM COATED ORAL at 19:25

## 2019-02-23 ASSESSMENT — ACTIVITIES OF DAILY LIVING (ADL)
HYGIENE/GROOMING: PROMPTS
ORAL_HYGIENE: PROMPTS
DRESS: SCRUBS (BEHAVIORAL HEALTH)
ORAL_HYGIENE: INDEPENDENT
DRESS: SCRUBS (BEHAVIORAL HEALTH);INDEPENDENT
LAUNDRY: UNABLE TO COMPLETE
HYGIENE/GROOMING: INDEPENDENT
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
## 2019-02-24 LAB
GLUCOSE BLDC GLUCOMTR-MCNC: 112 MG/DL (ref 70–99)
GLUCOSE BLDC GLUCOMTR-MCNC: 129 MG/DL (ref 70–99)

## 2019-02-24 PROCEDURE — 12400001 ZZH R&B MH UMMC

## 2019-02-24 PROCEDURE — 25000132 ZZH RX MED GY IP 250 OP 250 PS 637: Performed by: EMERGENCY MEDICINE

## 2019-02-24 PROCEDURE — 25000132 ZZH RX MED GY IP 250 OP 250 PS 637: Performed by: PSYCHIATRY & NEUROLOGY

## 2019-02-24 PROCEDURE — 00000146 ZZHCL STATISTIC GLUCOSE BY METER IP

## 2019-02-24 RX ADMIN — CLOZAPINE 200 MG: 100 TABLET ORAL at 08:58

## 2019-02-24 RX ADMIN — METFORMIN HYDROCHLORIDE 1000 MG: 500 TABLET ORAL at 18:17

## 2019-02-24 RX ADMIN — PROPRANOLOL HYDROCHLORIDE 80 MG: 80 CAPSULE, EXTENDED RELEASE ORAL at 08:58

## 2019-02-24 RX ADMIN — METFORMIN HYDROCHLORIDE 1000 MG: 500 TABLET ORAL at 08:58

## 2019-02-24 RX ADMIN — FENOFIBRATE 160 MG: 160 TABLET, FILM COATED ORAL at 08:58

## 2019-02-24 RX ADMIN — PANTOPRAZOLE SODIUM 20 MG: 20 TABLET, DELAYED RELEASE ORAL at 08:58

## 2019-02-24 RX ADMIN — ESCITALOPRAM 20 MG: 20 TABLET, FILM COATED ORAL at 08:58

## 2019-02-24 RX ADMIN — SIMVASTATIN 40 MG: 40 TABLET, FILM COATED ORAL at 20:31

## 2019-02-24 RX ADMIN — NIACIN 500 MG: 500 TABLET, EXTENDED RELEASE ORAL at 20:31

## 2019-02-24 RX ADMIN — ARIPIPRAZOLE 5 MG: 5 TABLET ORAL at 08:58

## 2019-02-24 RX ADMIN — CLOZAPINE 450 MG: 100 TABLET ORAL at 20:31

## 2019-02-24 ASSESSMENT — ACTIVITIES OF DAILY LIVING (ADL)
LAUNDRY: UNABLE TO COMPLETE
ORAL_HYGIENE: INDEPENDENT;PROMPTS
DRESS: STREET CLOTHES;PROMPTS
HYGIENE/GROOMING: INDEPENDENT;PROMPTS

## 2019-02-24 NOTE — PROGRESS NOTES
Pt was isolative and withdrawn, minimally visible in the milieu.  Blunted affect, poor hygiene.  No behavioral concerns this shift.     02/24/19 1500   Behavioral Health   Hallucinations denies / not responding to hallucinations   Thinking poor concentration   Orientation person: oriented   Insight insight appropriate to situation   Judgement impaired   Eye Contact into space;staring   Affect blunted, flat;tense   Mood anxious   Physical Appearance/Attire disheveled   Hygiene body odor   1. Wish to be Dead No   2. Non-Specific Active Suicidal Thoughts  No   Activity isolative;withdrawn   Speech coherent   Psychomotor / Gait balanced;steady   Activities of Daily Living   Hygiene/Grooming independent;prompts   Oral Hygiene independent;prompts   Dress street clothes;prompts   Laundry unable to complete   Room Organization prompts

## 2019-02-25 LAB
GLUCOSE BLDC GLUCOMTR-MCNC: 147 MG/DL (ref 70–99)
GLUCOSE BLDC GLUCOMTR-MCNC: 157 MG/DL (ref 70–99)

## 2019-02-25 PROCEDURE — 00000146 ZZHCL STATISTIC GLUCOSE BY METER IP

## 2019-02-25 PROCEDURE — 25000132 ZZH RX MED GY IP 250 OP 250 PS 637: Performed by: EMERGENCY MEDICINE

## 2019-02-25 PROCEDURE — 12400001 ZZH R&B MH UMMC

## 2019-02-25 PROCEDURE — 25000132 ZZH RX MED GY IP 250 OP 250 PS 637: Performed by: PSYCHIATRY & NEUROLOGY

## 2019-02-25 PROCEDURE — 99232 SBSQ HOSP IP/OBS MODERATE 35: CPT | Performed by: PSYCHIATRY & NEUROLOGY

## 2019-02-25 RX ADMIN — PANTOPRAZOLE SODIUM 20 MG: 20 TABLET, DELAYED RELEASE ORAL at 08:41

## 2019-02-25 RX ADMIN — ESCITALOPRAM 20 MG: 20 TABLET, FILM COATED ORAL at 08:41

## 2019-02-25 RX ADMIN — PROPRANOLOL HYDROCHLORIDE 80 MG: 80 CAPSULE, EXTENDED RELEASE ORAL at 08:41

## 2019-02-25 RX ADMIN — METFORMIN HYDROCHLORIDE 1000 MG: 500 TABLET ORAL at 18:38

## 2019-02-25 RX ADMIN — NIACIN 500 MG: 500 TABLET, EXTENDED RELEASE ORAL at 19:22

## 2019-02-25 RX ADMIN — METFORMIN HYDROCHLORIDE 1000 MG: 500 TABLET ORAL at 08:41

## 2019-02-25 RX ADMIN — ARIPIPRAZOLE 5 MG: 5 TABLET ORAL at 08:41

## 2019-02-25 RX ADMIN — CLOZAPINE 450 MG: 100 TABLET ORAL at 19:22

## 2019-02-25 RX ADMIN — FENOFIBRATE 160 MG: 160 TABLET, FILM COATED ORAL at 08:41

## 2019-02-25 RX ADMIN — CLOZAPINE 200 MG: 100 TABLET ORAL at 08:42

## 2019-02-25 RX ADMIN — SIMVASTATIN 40 MG: 40 TABLET, FILM COATED ORAL at 19:22

## 2019-02-25 ASSESSMENT — ACTIVITIES OF DAILY LIVING (ADL)
DRESS: SCRUBS (BEHAVIORAL HEALTH)
LAUNDRY: UNABLE TO COMPLETE
HYGIENE/GROOMING: PROMPTS
HYGIENE/GROOMING: PROMPTS
DRESS: SCRUBS (BEHAVIORAL HEALTH)
LAUNDRY: UNABLE TO COMPLETE
ORAL_HYGIENE: PROMPTS
ORAL_HYGIENE: PROMPTS
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
## 2019-02-25 NOTE — PLAN OF CARE
"48 hour nursing assessment completed. Patient presents as calm, cooperative, and medication compliant. Patient denies thoughts to harm others or himself. Patient continues to need promptings for ADLs and eating. Patient did shower today. Sat in the lounge during group time, but did not want to participate. Patient makes statements about drawing \" people\", then asks \"is that ok\". SIO discontinued at this time. Denies SI/SIB. Will continue to reassess and  monitor closely.   "

## 2019-02-25 NOTE — PROVIDER NOTIFICATION
"   02/25/19 1600   Behavioral Health   Suicidality (WDL) ex   Suicidality other (see comments)  (States \"I need Zuhair in my heart\" - but contracts for safety)   1. Wish to be Dead Yes   2. Non-Specific Active Suicidal Thoughts  No   3. Active Sucidal Ideation with any Methods (Not Plan) Without Intent to Act  No   4. Active Suicidal Ideation with Some Intent to Act, Without Specific Plan  No   5. Active Suicidal Ideation with Specific Plan and Intent  No   Change in Protective Factors? Yes (see comments)  (SIO d/c)   Enviromental Risk Factors None   Self Injury other (see comment)  (denies)     SIO discontinued, second check:    When asked if patient wanted to be dead he responded \"Yes, I need to get Zuhair in my heart\".  Patient otherwise contracted for safety and denied SI.  He stated he would be safe on the unit.  He stated he can talk to staff.  "

## 2019-02-25 NOTE — PROGRESS NOTES
Pt will be visited Wed the 27th by two group home staff to help assess readiness for discharge.

## 2019-02-25 NOTE — PROGRESS NOTES
02/24/19 2145   Behavioral Health   Hallucinations denies / not responding to hallucinations   Thinking paranoid;distractable;other (see comment)  (disorganized)   Orientation time: oriented;date: oriented   Memory baseline memory   Insight poor   Judgement impaired   Eye Contact at examiner   Affect blunted, flat   Mood mood is calm   Physical Appearance/Attire untidy;disheveled   Hygiene neglected grooming - unclean body, hair, teeth;body odor   Suicidality other (see comments)  (denies currently)   1. Wish to be Dead No   2. Non-Specific Active Suicidal Thoughts  No   Self Injury other (see comment)  (denies currently)   Elopement (no concerns)   Activity withdrawn;isolative   Speech coherent;clear   Medication Sensitivity no stated side effects   Psychomotor / Gait balanced;steady     Pt had uneventful shift. Mood was calm with flat affect. Denies SI/SIB. Pt was withdrawn and isolative to his room. Pt did not eat dinner. Hygiene is neglected (body odor) and Pt has disheveled appearance. No behavioral issues.

## 2019-02-26 LAB
GLUCOSE BLDC GLUCOMTR-MCNC: 120 MG/DL (ref 70–99)
GLUCOSE BLDC GLUCOMTR-MCNC: 139 MG/DL (ref 70–99)

## 2019-02-26 PROCEDURE — 25000132 ZZH RX MED GY IP 250 OP 250 PS 637: Performed by: EMERGENCY MEDICINE

## 2019-02-26 PROCEDURE — 12400001 ZZH R&B MH UMMC

## 2019-02-26 PROCEDURE — 99232 SBSQ HOSP IP/OBS MODERATE 35: CPT | Performed by: PSYCHIATRY & NEUROLOGY

## 2019-02-26 PROCEDURE — 25000132 ZZH RX MED GY IP 250 OP 250 PS 637: Performed by: PSYCHIATRY & NEUROLOGY

## 2019-02-26 PROCEDURE — G0177 OPPS/PHP; TRAIN & EDUC SERV: HCPCS

## 2019-02-26 PROCEDURE — 00000146 ZZHCL STATISTIC GLUCOSE BY METER IP

## 2019-02-26 RX ADMIN — METFORMIN HYDROCHLORIDE 1000 MG: 500 TABLET ORAL at 17:54

## 2019-02-26 RX ADMIN — ESCITALOPRAM 20 MG: 20 TABLET, FILM COATED ORAL at 08:17

## 2019-02-26 RX ADMIN — ARIPIPRAZOLE 5 MG: 5 TABLET ORAL at 08:18

## 2019-02-26 RX ADMIN — NIACIN 500 MG: 500 TABLET, EXTENDED RELEASE ORAL at 19:09

## 2019-02-26 RX ADMIN — FENOFIBRATE 160 MG: 160 TABLET, FILM COATED ORAL at 08:17

## 2019-02-26 RX ADMIN — CLOZAPINE 450 MG: 100 TABLET ORAL at 19:09

## 2019-02-26 RX ADMIN — OLANZAPINE 10 MG: 10 TABLET, FILM COATED ORAL at 12:52

## 2019-02-26 RX ADMIN — SIMVASTATIN 40 MG: 40 TABLET, FILM COATED ORAL at 19:09

## 2019-02-26 RX ADMIN — METFORMIN HYDROCHLORIDE 1000 MG: 500 TABLET ORAL at 08:17

## 2019-02-26 RX ADMIN — PANTOPRAZOLE SODIUM 20 MG: 20 TABLET, DELAYED RELEASE ORAL at 08:17

## 2019-02-26 RX ADMIN — CLOZAPINE 200 MG: 100 TABLET ORAL at 08:18

## 2019-02-26 ASSESSMENT — ACTIVITIES OF DAILY LIVING (ADL)
DRESS: SCRUBS (BEHAVIORAL HEALTH)
LAUNDRY: UNABLE TO COMPLETE
ORAL_HYGIENE: PROMPTS
HYGIENE/GROOMING: PROMPTS;SHOWER
ORAL_HYGIENE: PROMPTS
LAUNDRY: WITH SUPERVISION
HYGIENE/GROOMING: PROMPTS
DRESS: SCRUBS (BEHAVIORAL HEALTH)
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
## 2019-02-26 NOTE — PROGRESS NOTES
Pt was visible in the lounge for meals, otherwise withdrawn and isolative to his room resting/watching TV. Pt had a good shift , no verbal or physical aggression observed. Pt denies SI/SIB thoughts and HI. No further concerns.        02/26/19 1506   Behavioral Health   Hallucinations denies / not responding to hallucinations   Thinking poor concentration   Orientation place: oriented;person: oriented   Memory baseline memory   Insight poor   Judgement impaired   Eye Contact at examiner   Affect blunted, flat   Mood mood is calm   Physical Appearance/Attire attire appropriate to age and situation   Hygiene other (see comment)  (adequate)   Suicidality other (see comments)  (Pt denies)   1. Wish to be Dead No  (none stated)   Wish to be Dead Description (None stated or observed.)   2. Non-Specific Active Suicidal Thoughts  No   Non-Specific Active Suicidal Thought Description (None stated or observed)   Self Injury other (see comment)  (Pt denies)   Activity withdrawn   Speech flight of ideas   Medication Sensitivity no stated side effects;no observed side effects   Psychomotor / Gait balanced;steady   Psycho Education   Type of Intervention 1:1 intervention   Response participates, initiates socially appropriate   Hours 0.5   Treatment Detail (check in/ observation )   Activities of Daily Living   Hygiene/Grooming prompts   Oral Hygiene prompts   Dress scrubs (behavioral health)   Laundry with supervision   Room Organization prompts
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
## 2019-02-26 NOTE — PLAN OF CARE
OT General Care Plan  OT Goal 1  Description  Will attend OT groups improve concentration and comfort with engaging in more structured and success oriented options.     Pt attended group today for the first time since admission.  Previous days started sitting in the lounge area during group, though today sat at the table and took interest in filling out the worksheet on acknowledging accomplishments and discussing results.    Answers were simple, not always related to questions, and most made no sense.  Pt makes random comments that weren't related to the topic as others were talking, though comments weren't inappropriate.    Pt was given positive feedback for being in the group and filling out his worksheet and participating.  2/26/2019 1538 - Improving by Bonnie Rodriguez, OT

## 2019-02-26 NOTE — PROGRESS NOTES
The Medicare Rights form was received in the mail.   The guardian/father signed it on 2-16-19.
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
## 2019-02-26 NOTE — PROGRESS NOTES
Pt was visible in the milieu watched TV. Pt kept to himself, isolative and withdrawn. He mentioned to staff that he would not be eating dinner and asked if that would be okay. Pt was calm and cooperative.     02/25/19 1944   Behavioral Health   Hallucinations denies / not responding to hallucinations   Thinking distractable;poor concentration   Orientation person: oriented;place: oriented   Memory baseline memory   Insight poor   Judgement impaired   Eye Contact at examiner   Affect blunted, flat   Mood mood is calm   Physical Appearance/Attire untidy;disheveled   Hygiene neglected grooming - unclean body, hair, teeth   Activity isolative;withdrawn   Speech coherent   Medication Sensitivity no observed side effects   Psychomotor / Gait balanced;steady   Activities of Daily Living   Hygiene/Grooming prompts   Oral Hygiene prompts   Dress scrubs (behavioral health)   Laundry unable to complete   Room Organization prompts   .

## 2019-02-27 LAB
GLUCOSE BLDC GLUCOMTR-MCNC: 126 MG/DL (ref 70–99)
GLUCOSE BLDC GLUCOMTR-MCNC: 132 MG/DL (ref 70–99)

## 2019-02-27 PROCEDURE — 25000132 ZZH RX MED GY IP 250 OP 250 PS 637: Performed by: EMERGENCY MEDICINE

## 2019-02-27 PROCEDURE — 25000132 ZZH RX MED GY IP 250 OP 250 PS 637: Performed by: PSYCHIATRY & NEUROLOGY

## 2019-02-27 PROCEDURE — 00000146 ZZHCL STATISTIC GLUCOSE BY METER IP

## 2019-02-27 PROCEDURE — 12400001 ZZH R&B MH UMMC

## 2019-02-27 PROCEDURE — 99233 SBSQ HOSP IP/OBS HIGH 50: CPT | Performed by: PSYCHIATRY & NEUROLOGY

## 2019-02-27 RX ADMIN — PANTOPRAZOLE SODIUM 20 MG: 20 TABLET, DELAYED RELEASE ORAL at 09:00

## 2019-02-27 RX ADMIN — CLOZAPINE 200 MG: 100 TABLET ORAL at 09:00

## 2019-02-27 RX ADMIN — ESCITALOPRAM 20 MG: 20 TABLET, FILM COATED ORAL at 09:00

## 2019-02-27 RX ADMIN — NIACIN 500 MG: 500 TABLET, EXTENDED RELEASE ORAL at 19:18

## 2019-02-27 RX ADMIN — CLOZAPINE 450 MG: 100 TABLET ORAL at 19:19

## 2019-02-27 RX ADMIN — PROPRANOLOL HYDROCHLORIDE 80 MG: 80 CAPSULE, EXTENDED RELEASE ORAL at 09:00

## 2019-02-27 RX ADMIN — SIMVASTATIN 40 MG: 40 TABLET, FILM COATED ORAL at 19:18

## 2019-02-27 RX ADMIN — METFORMIN HYDROCHLORIDE 1000 MG: 500 TABLET ORAL at 09:00

## 2019-02-27 RX ADMIN — FENOFIBRATE 160 MG: 160 TABLET, FILM COATED ORAL at 09:00

## 2019-02-27 RX ADMIN — METFORMIN HYDROCHLORIDE 1000 MG: 500 TABLET ORAL at 17:53

## 2019-02-27 RX ADMIN — ARIPIPRAZOLE 5 MG: 5 TABLET ORAL at 09:00

## 2019-02-27 ASSESSMENT — ACTIVITIES OF DAILY LIVING (ADL)
HYGIENE/GROOMING: PROMPTS
ORAL_HYGIENE: PROMPTS
DRESS: SCRUBS (BEHAVIORAL HEALTH)

## 2019-02-27 NOTE — PLAN OF CARE
"Patient attended partial mental health management OT group. He required several repeats of 1 step directions and encouragement for follow through. He completed task on gratitude, unable to give definition of gratitude. He was hesitant to share things he was thankful for saying \"I'd rather not say\". He perseverated on cars and shared odd comments like \"sometimes people call me queer\". He responded positively to support from peer and writer. He demonstrated limited attention and capacity for focus. When staff asked him what he did in group, not 5 minutes later, he said he mariela his dream car and did not mention gratitude at all.   "
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
## 2019-02-28 LAB
ALBUMIN SERPL-MCNC: 3.7 G/DL (ref 3.4–5)
ALP SERPL-CCNC: 77 U/L (ref 40–150)
ALT SERPL W P-5'-P-CCNC: 30 U/L (ref 0–70)
ANION GAP SERPL CALCULATED.3IONS-SCNC: 8 MMOL/L (ref 3–14)
AST SERPL W P-5'-P-CCNC: 13 U/L (ref 0–45)
BILIRUB SERPL-MCNC: 0.3 MG/DL (ref 0.2–1.3)
BUN SERPL-MCNC: 12 MG/DL (ref 7–30)
CALCIUM SERPL-MCNC: 9 MG/DL (ref 8.5–10.1)
CHLORIDE SERPL-SCNC: 107 MMOL/L (ref 94–109)
CO2 SERPL-SCNC: 24 MMOL/L (ref 20–32)
CREAT SERPL-MCNC: 0.72 MG/DL (ref 0.66–1.25)
ERYTHROCYTE [DISTWIDTH] IN BLOOD BY AUTOMATED COUNT: 13.3 % (ref 10–15)
GFR SERPL CREATININE-BSD FRML MDRD: >90 ML/MIN/{1.73_M2}
GLUCOSE BLDC GLUCOMTR-MCNC: 121 MG/DL (ref 70–99)
GLUCOSE BLDC GLUCOMTR-MCNC: 130 MG/DL (ref 70–99)
GLUCOSE SERPL-MCNC: 120 MG/DL (ref 70–99)
HCT VFR BLD AUTO: 39.9 % (ref 40–53)
HGB BLD-MCNC: 12.9 G/DL (ref 13.3–17.7)
MCH RBC QN AUTO: 29.9 PG (ref 26.5–33)
MCHC RBC AUTO-ENTMCNC: 32.3 G/DL (ref 31.5–36.5)
MCV RBC AUTO: 93 FL (ref 78–100)
PLATELET # BLD AUTO: 414 10E9/L (ref 150–450)
POTASSIUM SERPL-SCNC: 3.9 MMOL/L (ref 3.4–5.3)
PROT SERPL-MCNC: 7.4 G/DL (ref 6.8–8.8)
RBC # BLD AUTO: 4.31 10E12/L (ref 4.4–5.9)
SODIUM SERPL-SCNC: 139 MMOL/L (ref 133–144)
WBC # BLD AUTO: 11.9 10E9/L (ref 4–11)

## 2019-02-28 PROCEDURE — 12400001 ZZH R&B MH UMMC

## 2019-02-28 PROCEDURE — 93005 ELECTROCARDIOGRAM TRACING: CPT

## 2019-02-28 PROCEDURE — 93010 ELECTROCARDIOGRAM REPORT: CPT | Performed by: INTERNAL MEDICINE

## 2019-02-28 PROCEDURE — 25000132 ZZH RX MED GY IP 250 OP 250 PS 637: Performed by: EMERGENCY MEDICINE

## 2019-02-28 PROCEDURE — 99221 1ST HOSP IP/OBS SF/LOW 40: CPT | Performed by: PHYSICIAN ASSISTANT

## 2019-02-28 PROCEDURE — 80053 COMPREHEN METABOLIC PANEL: CPT | Performed by: PHYSICIAN ASSISTANT

## 2019-02-28 PROCEDURE — 99207 ZZC CONSULT E&M CHANGED TO INITIAL LEVEL: CPT | Performed by: PHYSICIAN ASSISTANT

## 2019-02-28 PROCEDURE — 36415 COLL VENOUS BLD VENIPUNCTURE: CPT | Performed by: PHYSICIAN ASSISTANT

## 2019-02-28 PROCEDURE — 85027 COMPLETE CBC AUTOMATED: CPT | Performed by: PHYSICIAN ASSISTANT

## 2019-02-28 PROCEDURE — 25000132 ZZH RX MED GY IP 250 OP 250 PS 637: Performed by: PSYCHIATRY & NEUROLOGY

## 2019-02-28 PROCEDURE — 00000146 ZZHCL STATISTIC GLUCOSE BY METER IP

## 2019-02-28 RX ADMIN — ESCITALOPRAM 20 MG: 20 TABLET, FILM COATED ORAL at 09:01

## 2019-02-28 RX ADMIN — METFORMIN HYDROCHLORIDE 1000 MG: 500 TABLET ORAL at 18:00

## 2019-02-28 RX ADMIN — CLOZAPINE 450 MG: 100 TABLET ORAL at 19:20

## 2019-02-28 RX ADMIN — SIMVASTATIN 40 MG: 40 TABLET, FILM COATED ORAL at 19:20

## 2019-02-28 RX ADMIN — PANTOPRAZOLE SODIUM 20 MG: 20 TABLET, DELAYED RELEASE ORAL at 09:01

## 2019-02-28 RX ADMIN — CLOZAPINE 200 MG: 100 TABLET ORAL at 09:02

## 2019-02-28 RX ADMIN — METFORMIN HYDROCHLORIDE 1000 MG: 500 TABLET ORAL at 09:01

## 2019-02-28 RX ADMIN — ARIPIPRAZOLE 5 MG: 5 TABLET ORAL at 09:02

## 2019-02-28 RX ADMIN — FENOFIBRATE 160 MG: 160 TABLET, FILM COATED ORAL at 09:01

## 2019-02-28 RX ADMIN — NIACIN 500 MG: 500 TABLET, EXTENDED RELEASE ORAL at 19:20

## 2019-02-28 ASSESSMENT — ACTIVITIES OF DAILY LIVING (ADL)
LAUNDRY: UNABLE TO COMPLETE
ORAL_HYGIENE: PROMPTS
DRESS: SCRUBS (BEHAVIORAL HEALTH)
HYGIENE/GROOMING: SHOWER;PROMPTS

## 2019-02-28 NOTE — CONSULTS
Internal Medicine Initial Visit      Brayden Adrian MRN# [id number]   YOB: 1965 Age: 53 year old   Date of Admission: 2/8/2019  PCP: No primary care provider on file.    Referring Provider: Behavioral Health - Cayetano Barfield MD  Reason for Visit: General Medical Evaluation          Assessment and Recommendations:   Brayden Adrian is a 53 year old year old man with a history of HTN, T2DM, depression, anxiety, HLD, GERD, tardive dyskinesia, intellectual disability, schizoaffective disorder bipolar type, and polysubstance abuse who is admitted to [address] on 2/11/19 with increased sexual inappropriateness, aggressive behaviors and worsening function. Internal Medicine consultation was ordered by Dr. Cayetano Barfield for evaluation of orthostatic blood pressure.        Hx of schizoaffective disorder bipolar type  Hx of depression, anxiety  Hx of tardive dyskinesia  Hx of intellectual disability:  -Management per psychiatry team.     Orthostatic hypotension: Pt with a positive orthostatic VS this AM at 0739 with /83 and sitting BP 64/48 with HR from 105 to 118 respectively. Repeat BP at 0842 with sitting BP of 137/76 and standing BP 94/52 without significant change in HR. Most recent orthostatic VS were negative at 1247 with sitting /94 and standing BP of 117/85. Upon assessment, patient was in no acute distress. He had disorganized speech and flight of ideas therefore making the history difficult. CBC, CMP and ECG were obtained to assess for any acute electrolyte abnormality, anemia or arrhythmia. No electrolyte abnormalities. Renal function stable. CBC with WBC of 11.9 (down from 12.5 on 2/16), stable H/H of 12.9/39.9. ECG obtained showing sinus tachycardia of 105, No QTc prolongation or arrhythmia. There was ST and T wave abnormalities/depression noted in the anterior/inferior leads. Per chart review, this ECG is similar to documented ECG in 2/1/19, 2/15/17, as well as  in 10/2011 when patient underwent an ECHO and NM stress test. The NM stress test at that time discussed patient's resting ST-T abnormality at that time. There was a normal myocardial perfusion noted. ECHO with mild LVH but with EF of 75% without wall motion abnormalities. Discussed with Dr. Jonathan Ramirez, Internal Medicine and agrees no further cardiac work-up necessary. Medications that can induce hypotension include clozaril (9-13%), propranolol, hydralazine, and niacin. Pt did not receive this morning's propranolol dose as this was held. Suspect at this time the orthostatic hypotension is likely secondary to Clozaril at this time. Other etiologies suspected include propranolol and poor oral intake.   -Encourage adequate fluid intake   -Consider dose adjustments of clozaril if patient's orthostatic hypotension continues or if symptomatic; defer to psychiatry team   -Consider adjustments to Propranolol per psychatry team; hold parameters were initiated to hold if SBP <110, DBP <60 or HR <60.   -Please continue to monitor VS. Please page internal medicine if patient continues to be orthostatic or if hypotensive with BP <90/60 and symptomatic including chest pain, SOB, dizziness, lightheadedness or syncope  -If continues, may consider IV fluid challenge    Case was discussed with Internal Medicine physician Dr. Ramirez who agrees with plan of care.     Medicine will monitor BP peripherally, please page with any additional concerns.     Masha Weaver PA-C  Hospitalist Service   Pager: [phone]     Reason for Admission:   Schizoaffective disorder bipolar type  Increased sexual inappropriateness, aggressive behaviors and worsening function         History of Present Illness:   History is obtained from the patient and medical record. History was limited due to patient's psychiatric state.     Brayden Adrian is a 53 year old year old man with a history of HTN, T2DM, depression, anxiety, HLD, GERD, tardive  "dyskinesia, intellectual disability, schizoaffective disorder bipolar type, and polysubstance abuse who is admitted to [facility] on 2/11/19 with increased sexual inappropriateness, aggressive behaviors and worsening function. Internal Medicine consultation was ordered by Dr. Cayetano Barfield for evaluation of orthostatic blood pressure.     Per chart review, patient had a positive orthostatic BP this morning. On assessment of the patient, he has disorganized thought and flight of ideas. He states that he once had dizziness and felt like, \"his organs were coming out.\" However, he no longer feels this. He is able to ambulate independently. When asking patient of chest pain, he says yes, but then when asked again he said no. He states he has been drinking \"a lot of coffee,\" and states that he has not had as much water. He states he eats three meals per day. He denies any abdominal pain or changes in urination. No bleeding.            Review of Systems:    ROS: 10 point ROS neg other than the symptoms noted above in the HPI.          Past Medical History:   Reviewed and updated in Epic.  Past Medical History:   Diagnosis Date     Anxiety      Cognitive disorder      Depressive disorder      Diabetes mellitus type 2 in nonobese (H)      Gastritis      GERD (gastroesophageal reflux disease)      Hyperlipidemia      Hyperlipidemia      Hypertension      Paranoid schizophrenia (H)      Polysubstance abuse (H)      Tardive dyskinesia              Past Surgical History:   Reviewed and updated in Epic.  Past Surgical History:   Procedure Laterality Date     ENT SURGERY               Social History:     Social History     Tobacco Use     Smoking status: Not on file   Substance Use Topics     Alcohol use: Not on file     Drug use: Not on file             Family History:   Reviewed and updated in Epic.  Family History   Problem Relation Age of Onset     Schizophrenia Paternal Aunt              Allergies:   No Known " Allergies          Medications:     Medications Prior to Admission   Medication Sig Dispense Refill Last Dose     cloZAPine (CLOZARIL) 100 MG tablet Take 200 mg by mouth every morning    2/8/2019 at AM     cloZAPine (CLOZARIL) 100 MG tablet Take 400 mg by mouth At Bedtime    2/7/2019 at HS     cloZAPine (CLOZARIL) 25 MG tablet Take 50 mg by mouth At Bedtime   2/7/2019 at HS     docusate sodium (COLACE) 100 MG tablet Take 100 mg by mouth 2 times daily    2/8/2019 at AM     escitalopram (LEXAPRO) 10 MG tablet Take 20 mg by mouth daily   2/8/2019 at AM     fenofibrate (TRIGLIDE/LOFIBRA) 160 MG tablet Take 160 mg by mouth daily    2/7/2019 at HS     fish oil-omega-3 fatty acids 1000 MG capsule Take 1 g by mouth 2 times daily    2/8/2019 at AM     haloperidol (HALDOL) 5 MG tablet Take 2.5 mg by mouth daily At 4 pm   2/7/2019 at 1600     haloperidol (HALDOL) 5 MG tablet Take 5 mg by mouth 2 times daily    2/8/2019 at AM     ibuprofen (ADVIL/MOTRIN) 200 MG tablet Take 200 mg by mouth every 4 hours as needed for mild pain   2/8/2019 at AM     medroxyPROGESTERone (DEPO-PROVERA) 150 MG/ML IM injection Inject 300 mg into the muscle every 28 days    DUE 2/11/19     melatonin 5 MG tablet Take 10 mg by mouth nightly as needed for sleep   2/7/2019 at HS     metFORMIN (GLUCOPHAGE) 1000 MG tablet Take 1,000 mg by mouth 2 times daily (with meals)   2/8/2019 at AM     Multiple Vitamins-Minerals (CENTROVITE) TABS Take 1 tablet by mouth daily    2/8/2019 at AM     niacin ER (NIASPAN) 500 MG CR tablet Take 500 mg by mouth At Bedtime   2/7/2019 at HS     pantoprazole (PROTONIX) 20 MG EC tablet Take 20 mg by mouth daily    2/8/2019 at AM     Podiatric Products (DIADERM FOOT REJUVENATING) CREA Externally apply topically daily   2/8/2019 at AM     polyethylene glycol (MIRALAX/GLYCOLAX) packet Take 1 packet by mouth daily   2/8/2019 at AM     propranolol ER (INDERAL LA) 80 MG 24 hr capsule Take 80 mg by mouth daily   2/8/2019 at AM      simvastatin (ZOCOR) 40 MG tablet Take 40 mg by mouth At Bedtime   2/7/2019 at HS     Sodium Fluoride (SF 5000 PLUS) 1.1 % CREA Apply to affected area 2 times daily   2/8/2019 at AM     vitamin D3 (CHOLECALCIFEROL) 2000 units tablet Take 1 tablet by mouth daily   2/8/2019 at AM        Current Facility-Administered Medications   Medication     acetaminophen (TYLENOL) tablet 650 mg     alum & mag hydroxide-simethicone (MYLANTA ES/MAALOX  ES) suspension 30 mL     ARIPiprazole (ABILIFY) tablet 5 mg     benztropine (COGENTIN) tablet 0.5 mg     bisacodyl (DULCOLAX) Suppository 10 mg     cloZAPine (CLOZARIL) tablet 200 mg     cloZAPine (CLOZARIL) tablet 450 mg     escitalopram (LEXAPRO) tablet 20 mg     fenofibrate (TRIGLIDE/LOFIBRA) tablet 160 mg     hydrOXYzine (ATARAX) tablet 25-50 mg     magnesium hydroxide (MILK OF MAGNESIA) suspension 30 mL     medroxyPROGESTERone (DEPO-PROVERA) injection 150 mg     melatonin tablet 10 mg     metFORMIN (GLUCOPHAGE) tablet 1,000 mg     niacin ER (NIASPAN) CR tablet 500 mg     OLANZapine (zyPREXA) tablet 10 mg    Or     OLANZapine (zyPREXA) injection 10 mg     pantoprazole (PROTONIX) EC tablet 20 mg     propranolol ER (INDERAL LA) 24 hr capsule 80 mg     simvastatin (ZOCOR) tablet 40 mg     traZODone (DESYREL) tablet 50 mg            Physical Exam:   Blood pressure 100/83, pulse 105, temperature 99.1  F (37.3  C), temperature source Tympanic, resp. rate 16, weight 93.4 kg (206 lb), SpO2 100 %.  There is no height or weight on file to calculate BMI.  GENERAL: Alert and oriented x 3.   HEENT: Anicteric sclera. Mucous membranes moist.   CV: RRR. S1, S2. No murmurs appreciated.   RESPIRATORY: Effort normal on room air. Lungs CTAB with no wheezing, rales, rhonchi.   GI: Abdomen soft, non distended, non tender. No guarding, rigidity or rebound tenderness.  MUSCULOSKELETAL: No joint swelling or tenderness.  NEUROLOGICAL: No focal deficits. Moves all extremities.   EXTREMITIES: No peripheral  edema. Intact bilateral pedal pulses.   SKIN: No jaundice. No rashes.           Data:     ROUTINE IP LABS (Last four results)  Recent Labs   Lab 02/28/19  1225      POTASSIUM 3.9   CHLORIDE 107   CO2 24   ANIONGAP 8   *   BUN 12   CR 0.72   KALEIGH 9.0   PROTTOTAL 7.4   ALBUMIN 3.7   BILITOTAL 0.3   ALKPHOS 77   AST 13   ALT 30     Recent Labs   Lab 02/28/19  1146   WBC 11.9*   RBC 4.31*   HGB 12.9*   HCT 39.9*   MCV 93   MCH 29.9   MCHC 32.3   RDW 13.3        No lab results found in last 7 days.     Glucose Values Latest Ref Rng & Units 2/25/2019 2/26/2019 2/26/2019 2/27/2019 2/27/2019 2/28/2019 2/28/2019   Bedside Glucose (mg/dl )  - -- -- -- -- -- -- --   GLUCOSE 70 - 99 mg/dL 157(H) 139(H) 120(H) 126(H) 132(H) 130(H) 120(H)   Some recent data might be hidden        All labs personally reviewed in Psychiatric.  See A&P for additional results.     Unresulted Labs Ordered in the Past 30 Days of this Admission     No orders found from 12/10/2018 to 2/9/2019.           TSH:  No results found for: TSH    Reviewed ECG:   ECG: Sinus tachycardia; ST and T wave abnormality; consider inferior and anteriorlateral ischemia   QTc 412.

## 2019-02-28 NOTE — PROGRESS NOTES
02/28/19 1441   Behavioral Health   Hallucinations appears responding   Thinking confused;distractable;delusional;poor concentration   Orientation other (see comment)  (Unable to assess)   Memory baseline memory   Insight poor   Judgement impaired   Eye Contact at examiner   Affect blunted, flat   Mood mood is calm   Physical Appearance/Attire untidy;disheveled   Hygiene other (see comment)  (Pt. Showered at end of day shift.)   Suicidality other (see comments)  (None stated or observed)   1. Wish to be Dead No   2. Non-Specific Active Suicidal Thoughts  No   Self Injury other (see comment)  (BRO)   Activity other (see comment)  (Pt. attended groups and observed in lounges)   Speech flight of ideas;incoherent;rambling   Medication Sensitivity no observed side effects;no stated side effects       Pt. Was calm and cooperative all shift. Pt. Was visible in the lounges and attended groups when directed by staff. Pt. Hyper focused on being told what to do by staff. Pt. Appears to not be able to make own decisions on daily activities (watch tv, leave room, go to group). Pt. Sleep habits seem average, Eating habits are average, and hygiene habits are decent with direction.

## 2019-02-28 NOTE — PROVIDER NOTIFICATION
02/28/19 0842 02/28/19 0844   Sitting Orthostatic BP   Sitting Orthostatic /76 --    Sitting Orthostatic Pulse 106 bpm --    Standing Orthostatic BP   Standing Orthostatic BP --  94/52   Standing Orthostatic Pulse --  102 bpm     Dr. Maya notified.  Orders to hold morning propanol and requested internal medicine complete a chart review.

## 2019-03-01 LAB
GLUCOSE BLDC GLUCOMTR-MCNC: 128 MG/DL (ref 70–99)
GLUCOSE BLDC GLUCOMTR-MCNC: 149 MG/DL (ref 70–99)
INTERPRETATION ECG - MUSE: NORMAL

## 2019-03-01 PROCEDURE — H2032 ACTIVITY THERAPY, PER 15 MIN: HCPCS

## 2019-03-01 PROCEDURE — 99232 SBSQ HOSP IP/OBS MODERATE 35: CPT | Performed by: PSYCHIATRY & NEUROLOGY

## 2019-03-01 PROCEDURE — 25000132 ZZH RX MED GY IP 250 OP 250 PS 637: Performed by: EMERGENCY MEDICINE

## 2019-03-01 PROCEDURE — 25000132 ZZH RX MED GY IP 250 OP 250 PS 637: Performed by: PHYSICIAN ASSISTANT

## 2019-03-01 PROCEDURE — 25000132 ZZH RX MED GY IP 250 OP 250 PS 637: Performed by: PSYCHIATRY & NEUROLOGY

## 2019-03-01 PROCEDURE — 12400001 ZZH R&B MH UMMC

## 2019-03-01 PROCEDURE — 00000146 ZZHCL STATISTIC GLUCOSE BY METER IP

## 2019-03-01 PROCEDURE — 25000128 H RX IP 250 OP 636: Performed by: PSYCHIATRY & NEUROLOGY

## 2019-03-01 RX ADMIN — PROPRANOLOL HYDROCHLORIDE 80 MG: 80 CAPSULE, EXTENDED RELEASE ORAL at 08:38

## 2019-03-01 RX ADMIN — NIACIN 500 MG: 500 TABLET, EXTENDED RELEASE ORAL at 19:49

## 2019-03-01 RX ADMIN — PANTOPRAZOLE SODIUM 20 MG: 20 TABLET, DELAYED RELEASE ORAL at 08:38

## 2019-03-01 RX ADMIN — FENOFIBRATE 160 MG: 160 TABLET, FILM COATED ORAL at 08:38

## 2019-03-01 RX ADMIN — METFORMIN HYDROCHLORIDE 1000 MG: 500 TABLET ORAL at 08:38

## 2019-03-01 RX ADMIN — CLOZAPINE 200 MG: 100 TABLET ORAL at 08:38

## 2019-03-01 RX ADMIN — ARIPIPRAZOLE 5 MG: 5 TABLET ORAL at 08:38

## 2019-03-01 RX ADMIN — METFORMIN HYDROCHLORIDE 1000 MG: 500 TABLET ORAL at 18:06

## 2019-03-01 RX ADMIN — CLOZAPINE 450 MG: 100 TABLET ORAL at 19:49

## 2019-03-01 RX ADMIN — MEDROXYPROGESTERONE ACETATE 150 MG: 150 INJECTION, SUSPENSION INTRAMUSCULAR at 18:06

## 2019-03-01 RX ADMIN — ESCITALOPRAM 20 MG: 20 TABLET, FILM COATED ORAL at 08:37

## 2019-03-01 RX ADMIN — SIMVASTATIN 40 MG: 40 TABLET, FILM COATED ORAL at 19:49

## 2019-03-01 RX ADMIN — ACETAMINOPHEN 650 MG: 325 TABLET, FILM COATED ORAL at 18:51

## 2019-03-01 ASSESSMENT — ACTIVITIES OF DAILY LIVING (ADL)
LAUNDRY: UNABLE TO COMPLETE
HYGIENE/GROOMING: PROMPTS
HYGIENE/GROOMING: PROMPTS
ORAL_HYGIENE: PROMPTS
DRESS: SCRUBS (BEHAVIORAL HEALTH)
ORAL_HYGIENE: PROMPTS
LAUNDRY: UNABLE TO COMPLETE
DRESS: SCRUBS (BEHAVIORAL HEALTH)
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
## 2019-03-01 NOTE — PLAN OF CARE
Behavioral Disturbance  2/28/2019 2001 - Improving by Carlos Melchor, RN     Patient visible in the milieu.  Cooperative with staff.  He had a flat affect.  He was tangential and had a flight of ideas.  Disorganized statements.  He was religiously pre-occupied.  He asked staff for frequent reassurance regarding his behaviors.  He contracted for safety.  No agitation, or aggression towards others.  He denied anxiety, depression, SI, and SIB.    Patient was compliant with HS medications.   Denied pain.  No statements of feeling dizzy.  He showered and ate supper.  Continue to encourage fluids.

## 2019-03-01 NOTE — PROGRESS NOTES
"On Wed 2-27, two staff from pt's group home and his guardian/father visited to help assess pt's improvement and readiness for discharge.  Although they said there was noticeable improvement, they feel pt still needs more time in the hospital.  An example of pt not doing well is, when pt is asked a question and he responds with laughter followed by saying, \"I better not say.\"    They will likely visit pt again next week and provide feedback to us again.      "

## 2019-03-01 NOTE — PROGRESS NOTES
Pt was visible out in the lounge earlier in the shift watching TV with peers. Calm and cooperative with staff, blunted flat affect . Pt appears disorganized , occasionally making delusional statements. Pt was later withdrawn and isolative to his room for the reminder of the shift . No aggressive behaviors observed , denies SI/SIB thoughts . No further concerns.       03/01/19 1444   Behavioral Health   Hallucinations denies / not responding to hallucinations   Thinking poor concentration   Orientation person: oriented;place: oriented   Memory short term   Insight poor   Judgement impaired   Eye Contact at floor   Affect blunted, flat   Mood mood is calm   Physical Appearance/Attire attire appropriate to age and situation   Hygiene neglected grooming - unclean body, hair, teeth   Suicidality other (see comments)  (Pt denies)   1. Wish to be Dead No   Wish to be Dead Description (none stated or observed.)   2. Non-Specific Active Suicidal Thoughts  No   Non-Specific Active Suicidal Thought Description (none stated or observed.)   Self Injury other (see comment)  (None observed.)   Activity withdrawn   Speech tangential;flight of ideas   Medication Sensitivity no observed side effects;no stated side effects   Psychomotor / Gait balanced   Psycho Education   Type of Intervention 1:1 intervention   Response participates, initiates socially appropriate   Hours 0.5   Treatment Detail (check in/ observation )   Activities of Daily Living   Hygiene/Grooming prompts   Oral Hygiene prompts   Dress scrubs (behavioral health)   Laundry unable to complete   Room Organization prompts

## 2019-03-01 NOTE — PROGRESS NOTES
Brief Medicine Note    Internal Medicine following for follow-up of orthostatic hypotension episode that occurred on 2/28/19. Per review of vitals; orthostatic VS was negative this AM with sitting BP of 127/78 with  with standing BP of 111/91 with pulse of 121. Last evening /75 and HR of 105.     Today's vital signs, medications, and nursing notes were reviewed.     A/P:  Orthostatic hypotension  Tachycardia:  Pt with negative orthostatic hypotension yesterday 2/28. Negative orthostatic VS this AM. However, patient noted to be tachycardic. Has a hx of anxiety and psychiatric illness on Clozaril, therefore tachycardia likely secondary to anxiety vs medication induced. ECG obtained yesterday without any acute changes to previous ECGs (see yesterday's note for details). Will continue to monitor BP and HR peripherally. Will hold starting additional BP medication at this time to lower heart rate at risk of dropping BP. Discussed with Dr. Ramirez, Internal Medicine who agrees with plan of care. Please notify Internal Medicine if patient is persistently tachycardic >110bpm or if patient has orthostatic VS or hypotension of SBP <90 or DBP <60 or if he is symptomatic with dizziness, lightheadedness, SOB or chest pain.     Masha Weaver PA-C  Hospitalist Service  Pager [phone]

## 2019-03-02 LAB
GLUCOSE BLDC GLUCOMTR-MCNC: 103 MG/DL (ref 70–99)
GLUCOSE BLDC GLUCOMTR-MCNC: 128 MG/DL (ref 70–99)

## 2019-03-02 PROCEDURE — 00000146 ZZHCL STATISTIC GLUCOSE BY METER IP

## 2019-03-02 PROCEDURE — 25000132 ZZH RX MED GY IP 250 OP 250 PS 637: Performed by: EMERGENCY MEDICINE

## 2019-03-02 PROCEDURE — 25000132 ZZH RX MED GY IP 250 OP 250 PS 637: Performed by: PHYSICIAN ASSISTANT

## 2019-03-02 PROCEDURE — 25000132 ZZH RX MED GY IP 250 OP 250 PS 637: Performed by: PSYCHIATRY & NEUROLOGY

## 2019-03-02 PROCEDURE — 12400001 ZZH R&B MH UMMC

## 2019-03-02 RX ADMIN — PANTOPRAZOLE SODIUM 20 MG: 20 TABLET, DELAYED RELEASE ORAL at 08:19

## 2019-03-02 RX ADMIN — CLOZAPINE 450 MG: 100 TABLET ORAL at 19:20

## 2019-03-02 RX ADMIN — METFORMIN HYDROCHLORIDE 1000 MG: 500 TABLET ORAL at 17:47

## 2019-03-02 RX ADMIN — NIACIN 500 MG: 500 TABLET, EXTENDED RELEASE ORAL at 19:20

## 2019-03-02 RX ADMIN — SIMVASTATIN 40 MG: 40 TABLET, FILM COATED ORAL at 19:20

## 2019-03-02 RX ADMIN — PROPRANOLOL HYDROCHLORIDE 80 MG: 80 CAPSULE, EXTENDED RELEASE ORAL at 08:19

## 2019-03-02 RX ADMIN — FENOFIBRATE 160 MG: 160 TABLET, FILM COATED ORAL at 08:19

## 2019-03-02 RX ADMIN — ARIPIPRAZOLE 5 MG: 5 TABLET ORAL at 08:19

## 2019-03-02 RX ADMIN — CLOZAPINE 200 MG: 100 TABLET ORAL at 08:19

## 2019-03-02 RX ADMIN — METFORMIN HYDROCHLORIDE 1000 MG: 500 TABLET ORAL at 08:19

## 2019-03-02 RX ADMIN — ESCITALOPRAM 20 MG: 20 TABLET, FILM COATED ORAL at 08:19

## 2019-03-02 ASSESSMENT — ACTIVITIES OF DAILY LIVING (ADL)
DRESS: SCRUBS (BEHAVIORAL HEALTH)
DRESS: INDEPENDENT
LAUNDRY: UNABLE TO COMPLETE
ORAL_HYGIENE: INDEPENDENT
ORAL_HYGIENE: PROMPTS
HYGIENE/GROOMING: INDEPENDENT
HYGIENE/GROOMING: PROMPTS;SHOWER
LAUNDRY: UNABLE TO COMPLETE

## 2019-03-02 NOTE — PROGRESS NOTES
"Participated in Music Therapy group with focus on mood elevation, validation and decreasing anxiety and improved group cohesiveness. Engaged and cooperative in music listening interventions.   Showed progress in session goals.     Initially was confused and stated car names for his song choice, then Cricket marcial, then chose AC/DC and Meatloaf songs to listen to in group.  Played \"air guitar\" and did some stretching to the music.     "

## 2019-03-02 NOTE — PROGRESS NOTES
Brief Medicine Note    A/P:  Orthostatic hypotension  Tachycardia:  Pt with negative orthostatic hypotension  2/28. Negative orthostatic VS yesterday. However, patient noted to be tachycardic throughout hospitalization. Has a hx of anxiety and psychiatric illness/psychosis and continues to be acute psychotic. He is also on Clozaril, therefore tachycardia and intermittent orthostatic VS likely secondary to anxiety vs medication induced. ECG obtained 2/28 without any acute changes to previous ECGs (see consult note on 3/1 completed for details and recommendations). Will hold starting additional BP medication at this time to lower heart rate at risk of dropping BP. Discussed with Dr. Ramirez, Internal Medicine who agrees with plan of care.  Continue to encourage adequate fluid intake. Consider dose adjustments of clozaril if patient's orthostatic hypotension continues or if symptomatic. Consider adjustments to Propranolol per psychatry team; hold parameters were initiated to hold if SBP <110, DBP <60 or HR <60. Please notify Internal Medicine if patient is persistently tachycardic >120bpm or if patient has hypotension of SBP <90 or DBP <60 or if he is symptomatic with dizziness, lightheadedness, SOB or chest pain.     Medicine signing off. Please feel free to call with any questions.     Masha Weaver PA-C  Hospitalist Service  Pager [phone]

## 2019-03-02 NOTE — PROGRESS NOTES
Pt was active in the milieu throughout the shift. He was calm and presented a blunted/flat affect. No SI or SIB stated or observed. He did not display any concerns on this shift.        03/02/19 1517   Behavioral Health   Hallucinations denies / not responding to hallucinations   Thinking distractable   Orientation person: oriented;place: oriented   Memory baseline memory   Insight poor   Judgement impaired   Eye Contact at examiner   Affect blunted, flat   Mood mood is calm   Physical Appearance/Attire attire appropriate to age and situation   Hygiene neglected grooming - unclean body, hair, teeth   Suicidality other (see comments)  (none stated or observed )   1. Wish to be Dead No   2. Non-Specific Active Suicidal Thoughts  No   Self Injury other (see comment)  (none stated or observed )   Elopement (none stated or observed )   Activity other (see comment)  (active in the milieu )   Speech clear;coherent   Medication Sensitivity no stated side effects   Psychomotor / Gait balanced;steady   Activities of Daily Living   Hygiene/Grooming independent   Oral Hygiene independent   Dress independent   Laundry unable to complete   Room Organization independent

## 2019-03-02 NOTE — PLAN OF CARE
"Patient visible on unit within milieu for approximately half of shift, yet continues to be isolated to self. Attended music therapy group successfully. Blunted affect, disorganized thought process, flight of ideas. Poor hygiene, disheveled. 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 Reports AH of \"voices telling me to change the channel on the TV over and over again.\" and VH of \"A shen over there making bad faces at me.\" Denies command hallucinations, agrees to come to staff if this changes. Denies SI, SIB. Denies HI.     C/O HA, Tylenol given. Refused vital signs. Medication compliant. Requires prompts for ADL's. No aggressive behaviors reported or observed. Patient rec'd Depo-Prevera shot tonight.   "

## 2019-03-03 LAB
GLUCOSE BLDC GLUCOMTR-MCNC: 113 MG/DL (ref 70–99)
GLUCOSE BLDC GLUCOMTR-MCNC: 142 MG/DL (ref 70–99)

## 2019-03-03 PROCEDURE — 12400001 ZZH R&B MH UMMC

## 2019-03-03 PROCEDURE — 25000132 ZZH RX MED GY IP 250 OP 250 PS 637: Performed by: PSYCHIATRY & NEUROLOGY

## 2019-03-03 PROCEDURE — 25000132 ZZH RX MED GY IP 250 OP 250 PS 637: Performed by: EMERGENCY MEDICINE

## 2019-03-03 PROCEDURE — 25000132 ZZH RX MED GY IP 250 OP 250 PS 637: Performed by: PHYSICIAN ASSISTANT

## 2019-03-03 PROCEDURE — 00000146 ZZHCL STATISTIC GLUCOSE BY METER IP

## 2019-03-03 RX ADMIN — FENOFIBRATE 160 MG: 160 TABLET, FILM COATED ORAL at 08:18

## 2019-03-03 RX ADMIN — CLOZAPINE 450 MG: 100 TABLET ORAL at 19:14

## 2019-03-03 RX ADMIN — METFORMIN HYDROCHLORIDE 1000 MG: 500 TABLET ORAL at 18:00

## 2019-03-03 RX ADMIN — METFORMIN HYDROCHLORIDE 1000 MG: 500 TABLET ORAL at 08:18

## 2019-03-03 RX ADMIN — SIMVASTATIN 40 MG: 40 TABLET, FILM COATED ORAL at 19:14

## 2019-03-03 RX ADMIN — ESCITALOPRAM 20 MG: 20 TABLET, FILM COATED ORAL at 08:18

## 2019-03-03 RX ADMIN — ARIPIPRAZOLE 5 MG: 5 TABLET ORAL at 08:18

## 2019-03-03 RX ADMIN — NIACIN 500 MG: 500 TABLET, EXTENDED RELEASE ORAL at 19:14

## 2019-03-03 RX ADMIN — CLOZAPINE 200 MG: 100 TABLET ORAL at 08:18

## 2019-03-03 RX ADMIN — PROPRANOLOL HYDROCHLORIDE 80 MG: 80 CAPSULE, EXTENDED RELEASE ORAL at 08:18

## 2019-03-03 RX ADMIN — PANTOPRAZOLE SODIUM 20 MG: 20 TABLET, DELAYED RELEASE ORAL at 08:18

## 2019-03-03 ASSESSMENT — ACTIVITIES OF DAILY LIVING (ADL)
ORAL_HYGIENE: PROMPTS
LAUNDRY: UNABLE TO COMPLETE
ORAL_HYGIENE: PROMPTS
DRESS: SCRUBS (BEHAVIORAL HEALTH)
HYGIENE/GROOMING: PROMPTS
HYGIENE/GROOMING: PROMPTS
DRESS: SCRUBS (BEHAVIORAL HEALTH)

## 2019-03-03 NOTE — PLAN OF CARE
Pt continues to have symptoms of behavioral disturbance. Overall outward symptoms are minimized this shift. Pt was calm and appropriate in the milieu without any issues. Pt continues to have limited insight with possibly impaired judgement. Pt was medication compliant without issue. Pt needs firm convincing to engage in activities of daily living such as personal hygiene but is compliant. Pt reports no physical health concerns or side effects from medications this shift. Pt's VS were WDL with mild tachycardia which is expected per IM note. Pt did not need redirection for inappropriate sexual talk today. Pt appears to be at or near baseline at this time.

## 2019-03-03 NOTE — PROGRESS NOTES
"   03/03/19 1700   Behavioral Health   Hallucinations visual;auditory   Thinking poor concentration;distractable   Orientation person: oriented;place: oriented   Memory baseline memory   Insight poor   Judgement impaired   Eye Contact staring   Affect blunted, flat   Mood mood is calm   Suicidality thoughts only   1. Wish to be Dead No   2. Non-Specific Active Suicidal Thoughts  No   Self Injury (none stated)   Elopement (none stated or observed)   Activity isolative;withdrawn   Speech pressured   Medication Sensitivity (\"it feels like my organs are pickled\")   Psychomotor / Gait balanced;steady   Activities of Daily Living   Hygiene/Grooming prompts   Oral Hygiene prompts   Dress scrubs (behavioral health)   Laundry unable to complete   Room Organization independent   The patient isolated in his room for part of the evening. The patient did have an appetite and ate dinner tonight. The patient reported having suicidal thoughts but no plan to act. The patient stated having anxiety and depression tonight but could not elaborate on how severe they are. When asked about his depression and how bad it is, the patient said, \"the food isn't good around here.\"   "

## 2019-03-03 NOTE — PROGRESS NOTES
Pt isolative to room, withdrawn from peers and staff. Pt endorsed auditory and visual hallucinations. Pt attended to ADL's this evening with firm prompting. Pt presents with blunted affect, calm mood, pressured speech. No behavioral concerns to report this evening. Pt denies SI and SIB.     03/02/19 2037   Behavioral Health   Hallucinations auditory;visual   Thinking poor concentration;distractable;delusional   Orientation place: oriented;person: oriented   Memory baseline memory   Insight poor   Judgement impaired   Eye Contact at examiner;staring   Affect blunted, flat   Mood mood is calm   Physical Appearance/Attire disheveled   Hygiene other (see comment)  (showered)   Suicidality other (see comments)  (none observed)   1. Wish to be Dead No   2. Non-Specific Active Suicidal Thoughts  No   Self Injury other (see comment)  (none observed)   Activity isolative;withdrawn   Speech pressured   Medication Sensitivity no observed side effects;no stated side effects   Psychomotor / Gait balanced;steady   Activities of Daily Living   Hygiene/Grooming prompts;shower   Oral Hygiene prompts   Dress scrubs (behavioral health)   Laundry unable to complete   Room Organization independent

## 2019-03-04 LAB
GLUCOSE BLDC GLUCOMTR-MCNC: 116 MG/DL (ref 70–99)
GLUCOSE BLDC GLUCOMTR-MCNC: 121 MG/DL (ref 70–99)

## 2019-03-04 PROCEDURE — 25000132 ZZH RX MED GY IP 250 OP 250 PS 637: Performed by: PSYCHIATRY & NEUROLOGY

## 2019-03-04 PROCEDURE — 25000132 ZZH RX MED GY IP 250 OP 250 PS 637: Performed by: PHYSICIAN ASSISTANT

## 2019-03-04 PROCEDURE — 99232 SBSQ HOSP IP/OBS MODERATE 35: CPT | Performed by: PSYCHIATRY & NEUROLOGY

## 2019-03-04 PROCEDURE — 25000132 ZZH RX MED GY IP 250 OP 250 PS 637: Performed by: EMERGENCY MEDICINE

## 2019-03-04 PROCEDURE — 00000146 ZZHCL STATISTIC GLUCOSE BY METER IP

## 2019-03-04 PROCEDURE — 12400001 ZZH R&B MH UMMC

## 2019-03-04 RX ORDER — ARIPIPRAZOLE 10 MG/1
10 TABLET ORAL DAILY
Status: DISCONTINUED | OUTPATIENT
Start: 2019-03-05 | End: 2019-03-08

## 2019-03-04 RX ADMIN — CLOZAPINE 450 MG: 100 TABLET ORAL at 19:13

## 2019-03-04 RX ADMIN — ACETAMINOPHEN 650 MG: 325 TABLET, FILM COATED ORAL at 06:45

## 2019-03-04 RX ADMIN — PROPRANOLOL HYDROCHLORIDE 80 MG: 80 CAPSULE, EXTENDED RELEASE ORAL at 08:59

## 2019-03-04 RX ADMIN — METFORMIN HYDROCHLORIDE 1000 MG: 500 TABLET ORAL at 18:01

## 2019-03-04 RX ADMIN — ESCITALOPRAM 20 MG: 20 TABLET, FILM COATED ORAL at 08:59

## 2019-03-04 RX ADMIN — CLOZAPINE 200 MG: 100 TABLET ORAL at 08:59

## 2019-03-04 RX ADMIN — ARIPIPRAZOLE 5 MG: 5 TABLET ORAL at 08:59

## 2019-03-04 RX ADMIN — FENOFIBRATE 160 MG: 160 TABLET, FILM COATED ORAL at 08:59

## 2019-03-04 RX ADMIN — METFORMIN HYDROCHLORIDE 1000 MG: 500 TABLET ORAL at 08:59

## 2019-03-04 RX ADMIN — SIMVASTATIN 40 MG: 40 TABLET, FILM COATED ORAL at 19:13

## 2019-03-04 RX ADMIN — BENZTROPINE MESYLATE 0.5 MG: 0.5 TABLET ORAL at 08:59

## 2019-03-04 RX ADMIN — NIACIN 500 MG: 500 TABLET, EXTENDED RELEASE ORAL at 19:13

## 2019-03-04 RX ADMIN — PANTOPRAZOLE SODIUM 20 MG: 20 TABLET, DELAYED RELEASE ORAL at 08:59

## 2019-03-04 ASSESSMENT — ACTIVITIES OF DAILY LIVING (ADL)
ORAL_HYGIENE: INDEPENDENT
LAUNDRY: UNABLE TO COMPLETE
LAUNDRY: UNABLE TO COMPLETE
HYGIENE/GROOMING: SHOWER
ORAL_HYGIENE: INDEPENDENT
DRESS: SCRUBS (BEHAVIORAL HEALTH)
DRESS: SCRUBS (BEHAVIORAL HEALTH)
HYGIENE/GROOMING: INDEPENDENT
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
## 2019-03-04 NOTE — PROGRESS NOTES
This writer spoke with patient earlier this morning regarding his aggressive behavior last evening. Patient was unable to articulate why he became aggressive. He was informed that due to his behavior last evening he was back on a 1:1 and would be restricted to his room until he can agree to be safe. Denies AH/SI/SIB. Oriented to self only. Patient did shower with prompting and his linen changed in his room.     03/04/19 1326   Sleep/Rest/Relaxation   Day/Evening Time Hours up all shift   Behavioral Health   Hallucinations denies / not responding to hallucinations   Thinking poor concentration;distractable   Orientation person: oriented;other (see comment)  (March-'92)   Memory baseline memory   Insight poor   Judgement impaired   Eye Contact at floor;staring;at examiner   Affect blunted, flat   Mood anxious   Physical Appearance/Attire untidy;disheveled   Hygiene neglected grooming - unclean body, hair, teeth;other (see comment)  (Patient showered and linens changed this am)   Suicidality other (see comments)  (Denies)   1. Wish to be Dead No   2. Non-Specific Active Suicidal Thoughts  No   Self Injury other (see comment)  (No SIB observed)   Elopement (No behavior)   Activity isolative;withdrawn;restless;other (see comment)  (Per unit programing)   Speech clear;coherent   Medication Sensitivity EPSE;no stated side effects   Psychomotor / Gait balanced;steady   Substance Withdrawal Interventions   Social and Therapeutic Interventions (Substance Withdrawal) encourage effective boundaries with peers;encourage socialization with peers;encourage participation in therapeutic groups and milieu activities   Overt Aggression Scale   Verbal Aggression 0   Aggression against Property 0   Auto-Aggression 0   Physical Aggression 0   Overt Aggression Total Score 0   Psycho Education   Type of Intervention 1:1 intervention   Response participates with cues/redirection   Hours 0.5   Treatment Detail Triggers  (Check in)   Daily  Care   Activity up ad joyce   Activities of Daily Living   Hygiene/Grooming independent   Oral Hygiene independent   Dress scrubs (behavioral health)   Laundry unable to complete   Room Organization independent   Activity   Activity Assistance Provided independent

## 2019-03-05 LAB
GLUCOSE BLDC GLUCOMTR-MCNC: 110 MG/DL (ref 70–99)
GLUCOSE BLDC GLUCOMTR-MCNC: 166 MG/DL (ref 70–99)

## 2019-03-05 PROCEDURE — 25000132 ZZH RX MED GY IP 250 OP 250 PS 637: Performed by: EMERGENCY MEDICINE

## 2019-03-05 PROCEDURE — 99232 SBSQ HOSP IP/OBS MODERATE 35: CPT | Performed by: PSYCHIATRY & NEUROLOGY

## 2019-03-05 PROCEDURE — 25000132 ZZH RX MED GY IP 250 OP 250 PS 637: Performed by: PSYCHIATRY & NEUROLOGY

## 2019-03-05 PROCEDURE — 12400001 ZZH R&B MH UMMC

## 2019-03-05 PROCEDURE — 00000146 ZZHCL STATISTIC GLUCOSE BY METER IP

## 2019-03-05 PROCEDURE — 25000132 ZZH RX MED GY IP 250 OP 250 PS 637: Performed by: PHYSICIAN ASSISTANT

## 2019-03-05 RX ADMIN — CLOZAPINE 450 MG: 100 TABLET ORAL at 19:18

## 2019-03-05 RX ADMIN — PROPRANOLOL HYDROCHLORIDE 80 MG: 80 CAPSULE, EXTENDED RELEASE ORAL at 08:36

## 2019-03-05 RX ADMIN — CLOZAPINE 200 MG: 100 TABLET ORAL at 08:36

## 2019-03-05 RX ADMIN — PANTOPRAZOLE SODIUM 20 MG: 20 TABLET, DELAYED RELEASE ORAL at 08:36

## 2019-03-05 RX ADMIN — METFORMIN HYDROCHLORIDE 1000 MG: 500 TABLET ORAL at 18:21

## 2019-03-05 RX ADMIN — FENOFIBRATE 160 MG: 160 TABLET, FILM COATED ORAL at 08:36

## 2019-03-05 RX ADMIN — SIMVASTATIN 40 MG: 40 TABLET, FILM COATED ORAL at 19:19

## 2019-03-05 RX ADMIN — ESCITALOPRAM 20 MG: 20 TABLET, FILM COATED ORAL at 08:36

## 2019-03-05 RX ADMIN — NIACIN 500 MG: 500 TABLET, EXTENDED RELEASE ORAL at 19:19

## 2019-03-05 RX ADMIN — METFORMIN HYDROCHLORIDE 1000 MG: 500 TABLET ORAL at 08:36

## 2019-03-05 RX ADMIN — ARIPIPRAZOLE 10 MG: 10 TABLET ORAL at 08:37

## 2019-03-05 ASSESSMENT — ACTIVITIES OF DAILY LIVING (ADL)
HYGIENE/GROOMING: PROMPTS
DRESS: PROMPTS
LAUNDRY: UNABLE TO COMPLETE
HYGIENE/GROOMING: PROMPTS
ORAL_HYGIENE: PROMPTS
DRESS: PROMPTS
LAUNDRY: UNABLE TO COMPLETE
ORAL_HYGIENE: PROMPTS

## 2019-03-05 NOTE — PLAN OF CARE
48 Hour Assessment: Patient continued to be on SIO due to recent aggressive behaviors towards female staff. Writer met with the patient for a nursing assessment and patient denied having any current concern. He remained isolative and withdrawn, and appeared anxious and paranoid. Staff reported that patient was calm, cooperative and following directions during the shift. Staff did not observe aggressive or hypersexual behaviors during the shift. Patient was adherence to medications, denied side effects. He has not required R&S in the last 48 hours to manage his behaviors or maintain safety in the unit. Staff will continue to monitor.   Last sleep hours= 5.25, UC=384/85, P=109, T=98.7, R=16, Pain=denied.
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
## 2019-03-05 NOTE — PROGRESS NOTES
Pt spent time in lounge with SIO staff and had no problems. He was calm and cooperative. No aggressive behaviors or hypersexual comments this shift.

## 2019-03-05 NOTE — PROGRESS NOTES
Pt was sitting in the lounge, and invited to attend group, (only one other person present) though he declined.    Writer accepted this, and [facility] staff (his 1:1 staff was given the creative writing page - a ripped page from a book and a marker to draw on the page/[place] words, in effort to at least get pts attention).    Pt pleasantly stated he attended a group last week where he mariela his dream car, and got up and went to his room.
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
## 2019-03-06 LAB — GLUCOSE BLDC GLUCOMTR-MCNC: 186 MG/DL (ref 70–99)

## 2019-03-06 PROCEDURE — 25000132 ZZH RX MED GY IP 250 OP 250 PS 637: Performed by: PSYCHIATRY & NEUROLOGY

## 2019-03-06 PROCEDURE — 12400001 ZZH R&B MH UMMC

## 2019-03-06 PROCEDURE — 99232 SBSQ HOSP IP/OBS MODERATE 35: CPT | Performed by: PSYCHIATRY & NEUROLOGY

## 2019-03-06 PROCEDURE — 00000146 ZZHCL STATISTIC GLUCOSE BY METER IP

## 2019-03-06 PROCEDURE — 25000132 ZZH RX MED GY IP 250 OP 250 PS 637: Performed by: EMERGENCY MEDICINE

## 2019-03-06 PROCEDURE — 25000132 ZZH RX MED GY IP 250 OP 250 PS 637: Performed by: PHYSICIAN ASSISTANT

## 2019-03-06 RX ADMIN — PANTOPRAZOLE SODIUM 20 MG: 20 TABLET, DELAYED RELEASE ORAL at 08:48

## 2019-03-06 RX ADMIN — NIACIN 500 MG: 500 TABLET, EXTENDED RELEASE ORAL at 19:33

## 2019-03-06 RX ADMIN — ESCITALOPRAM 20 MG: 20 TABLET, FILM COATED ORAL at 08:47

## 2019-03-06 RX ADMIN — CLOZAPINE 200 MG: 100 TABLET ORAL at 08:48

## 2019-03-06 RX ADMIN — ARIPIPRAZOLE 10 MG: 10 TABLET ORAL at 08:48

## 2019-03-06 RX ADMIN — FENOFIBRATE 160 MG: 160 TABLET, FILM COATED ORAL at 08:47

## 2019-03-06 RX ADMIN — SIMVASTATIN 40 MG: 40 TABLET, FILM COATED ORAL at 19:33

## 2019-03-06 RX ADMIN — METFORMIN HYDROCHLORIDE 1000 MG: 500 TABLET ORAL at 08:48

## 2019-03-06 RX ADMIN — CLOZAPINE 450 MG: 100 TABLET ORAL at 19:33

## 2019-03-06 RX ADMIN — METFORMIN HYDROCHLORIDE 1000 MG: 500 TABLET ORAL at 17:53

## 2019-03-06 RX ADMIN — PROPRANOLOL HYDROCHLORIDE 80 MG: 80 CAPSULE, EXTENDED RELEASE ORAL at 14:04

## 2019-03-06 ASSESSMENT — ACTIVITIES OF DAILY LIVING (ADL)
LAUNDRY: UNABLE TO COMPLETE
DRESS: PROMPTS
ORAL_HYGIENE: PROMPTS
HYGIENE/GROOMING: PROMPTS

## 2019-03-06 NOTE — PROGRESS NOTES
"Writer spoke with Pts Doctor to approve administering Propanolol. Pts BP decreased when going from sitting to standing. Pt stated he was feeling \"dizzy\" when standing but later stated he felt \"fine\"  VS done a 2nd time in afternoon and VS WNL but continue to decrease when going from sitting to standing.     Pt stated he is having AH and VH. Pt stated he sees someone in a trailer net to his bed and stated he sees staff at night giving him dirty looks. Pt stated he did not sleep well because of this.   "
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
## 2019-03-07 LAB
GLUCOSE BLDC GLUCOMTR-MCNC: 116 MG/DL (ref 70–99)
GLUCOSE BLDC GLUCOMTR-MCNC: 189 MG/DL (ref 70–99)

## 2019-03-07 PROCEDURE — 25000132 ZZH RX MED GY IP 250 OP 250 PS 637: Performed by: PHYSICIAN ASSISTANT

## 2019-03-07 PROCEDURE — 25000132 ZZH RX MED GY IP 250 OP 250 PS 637: Performed by: PSYCHIATRY & NEUROLOGY

## 2019-03-07 PROCEDURE — 12400001 ZZH R&B MH UMMC

## 2019-03-07 PROCEDURE — 25000132 ZZH RX MED GY IP 250 OP 250 PS 637: Performed by: EMERGENCY MEDICINE

## 2019-03-07 PROCEDURE — 00000146 ZZHCL STATISTIC GLUCOSE BY METER IP

## 2019-03-07 PROCEDURE — H2032 ACTIVITY THERAPY, PER 15 MIN: HCPCS

## 2019-03-07 RX ADMIN — METFORMIN HYDROCHLORIDE 1000 MG: 500 TABLET ORAL at 09:18

## 2019-03-07 RX ADMIN — CLOZAPINE 200 MG: 100 TABLET ORAL at 09:17

## 2019-03-07 RX ADMIN — NIACIN 500 MG: 500 TABLET, EXTENDED RELEASE ORAL at 19:19

## 2019-03-07 RX ADMIN — FENOFIBRATE 160 MG: 160 TABLET, FILM COATED ORAL at 09:17

## 2019-03-07 RX ADMIN — CLOZAPINE 450 MG: 100 TABLET ORAL at 19:19

## 2019-03-07 RX ADMIN — OLANZAPINE 10 MG: 10 TABLET, FILM COATED ORAL at 17:17

## 2019-03-07 RX ADMIN — ESCITALOPRAM 20 MG: 20 TABLET, FILM COATED ORAL at 09:18

## 2019-03-07 RX ADMIN — METFORMIN HYDROCHLORIDE 1000 MG: 500 TABLET ORAL at 17:17

## 2019-03-07 RX ADMIN — PROPRANOLOL HYDROCHLORIDE 80 MG: 80 CAPSULE, EXTENDED RELEASE ORAL at 09:17

## 2019-03-07 RX ADMIN — BENZTROPINE MESYLATE 0.5 MG: 0.5 TABLET ORAL at 19:19

## 2019-03-07 RX ADMIN — ARIPIPRAZOLE 10 MG: 10 TABLET ORAL at 09:18

## 2019-03-07 RX ADMIN — SIMVASTATIN 40 MG: 40 TABLET, FILM COATED ORAL at 19:19

## 2019-03-07 RX ADMIN — PANTOPRAZOLE SODIUM 20 MG: 20 TABLET, DELAYED RELEASE ORAL at 09:18

## 2019-03-07 ASSESSMENT — ACTIVITIES OF DAILY LIVING (ADL)
HYGIENE/GROOMING: PROMPTS
ORAL_HYGIENE: PROMPTS
LAUNDRY: UNABLE TO COMPLETE
ORAL_HYGIENE: PROMPTS
HYGIENE/GROOMING: PROMPTS
DRESS: SCRUBS (BEHAVIORAL HEALTH)
DRESS: SCRUBS (BEHAVIORAL HEALTH)

## 2019-03-07 NOTE — PROGRESS NOTES
Pt isolated in room most of shift. Pt did come out for dinner and periodically to watch TV. Pt did not interact with peers, and made occasional one word comments to staff/self. Pt did not perform personal hygiene to writers knowledge. Pt denies SI/SIB, denies AH. Pt affect blunted/flat, and mood was calm. There were no other significant behavioral events during this shift.     03/06/19 2204   Behavioral Health   Hallucinations auditory;denies / not responding to hallucinations   Thinking distractable;poor concentration   Orientation situation, disoriented;person: oriented;place: oriented   Memory new learning, recall loss   Insight poor   Judgement impaired   Eye Contact at floor;into space   Affect blunted, flat   Mood mood is calm   Physical Appearance/Attire disheveled;untidy   Hygiene neglected grooming - unclean body, hair, teeth   Suicidality other (see comments)  (denies)   1. Wish to be Dead No   2. Non-Specific Active Suicidal Thoughts  No   Self Injury other (see comment)  (none observed)   Elopement (none observed)   Activity other (see comment);withdrawn  (active in milieu periodically)   Speech circumstantial;clear   Medication Sensitivity no stated side effects;no observed side effects   Psychomotor / Gait balanced;steady   Psycho Education   Type of Intervention 1:1 intervention   Response unavailable   Activities of Daily Living   Hygiene/Grooming prompts   Oral Hygiene prompts   Dress prompts   Laundry unable to complete   Room Organization independent

## 2019-03-07 NOTE — PLAN OF CARE
Pt continues to have symptoms of psychosis. Primarily disorganization of thoughts and speech content. Pt had no behavioral outbursts or episodes of physical or sexual aggression this shift. Pt denies current symptoms but states that medications are helpful. Pt took medications without issue. Pt has no postural hypotension this shift, actually his blood pressure increased with standing. Pt' BG was WDL this shift. Pt did not report any physical health symptoms this shift.
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
## 2019-03-08 LAB
BASOPHILS # BLD AUTO: 0 10E9/L (ref 0–0.2)
BASOPHILS NFR BLD AUTO: 0.4 %
DIFFERENTIAL METHOD BLD: NORMAL
EOSINOPHIL # BLD AUTO: 0 10E9/L (ref 0–0.7)
EOSINOPHIL NFR BLD AUTO: 0 %
GLUCOSE BLDC GLUCOMTR-MCNC: 139 MG/DL (ref 70–99)
GLUCOSE BLDC GLUCOMTR-MCNC: 144 MG/DL (ref 70–99)
IMM GRANULOCYTES # BLD: 0.1 10E9/L (ref 0–0.4)
IMM GRANULOCYTES NFR BLD: 0.5 %
LYMPHOCYTES # BLD AUTO: 3.1 10E9/L (ref 0.8–5.3)
LYMPHOCYTES NFR BLD AUTO: 28.4 %
MONOCYTES # BLD AUTO: 1 10E9/L (ref 0–1.3)
MONOCYTES NFR BLD AUTO: 8.6 %
NEUTROPHILS # BLD AUTO: 6.8 10E9/L (ref 1.6–8.3)
NEUTROPHILS NFR BLD AUTO: 62.1 %
NRBC # BLD AUTO: 0 10*3/UL
NRBC BLD AUTO-RTO: 0 /100
WBC # BLD AUTO: 11 10E9/L (ref 4–11)

## 2019-03-08 PROCEDURE — 99233 SBSQ HOSP IP/OBS HIGH 50: CPT | Performed by: PSYCHIATRY & NEUROLOGY

## 2019-03-08 PROCEDURE — 12400001 ZZH R&B MH UMMC

## 2019-03-08 PROCEDURE — 85048 AUTOMATED LEUKOCYTE COUNT: CPT | Performed by: PSYCHIATRY & NEUROLOGY

## 2019-03-08 PROCEDURE — 25000132 ZZH RX MED GY IP 250 OP 250 PS 637: Performed by: PSYCHIATRY & NEUROLOGY

## 2019-03-08 PROCEDURE — 85004 AUTOMATED DIFF WBC COUNT: CPT | Performed by: PSYCHIATRY & NEUROLOGY

## 2019-03-08 PROCEDURE — 25000132 ZZH RX MED GY IP 250 OP 250 PS 637: Performed by: EMERGENCY MEDICINE

## 2019-03-08 PROCEDURE — 36415 COLL VENOUS BLD VENIPUNCTURE: CPT | Performed by: PSYCHIATRY & NEUROLOGY

## 2019-03-08 PROCEDURE — 00000146 ZZHCL STATISTIC GLUCOSE BY METER IP

## 2019-03-08 RX ADMIN — SIMVASTATIN 40 MG: 40 TABLET, FILM COATED ORAL at 19:46

## 2019-03-08 RX ADMIN — METFORMIN HYDROCHLORIDE 1000 MG: 500 TABLET ORAL at 07:57

## 2019-03-08 RX ADMIN — ACETAMINOPHEN 650 MG: 325 TABLET, FILM COATED ORAL at 08:00

## 2019-03-08 RX ADMIN — CLOZAPINE 200 MG: 100 TABLET ORAL at 07:57

## 2019-03-08 RX ADMIN — NIACIN 500 MG: 500 TABLET, EXTENDED RELEASE ORAL at 19:46

## 2019-03-08 RX ADMIN — CLOZAPINE 450 MG: 100 TABLET ORAL at 19:46

## 2019-03-08 RX ADMIN — PANTOPRAZOLE SODIUM 20 MG: 20 TABLET, DELAYED RELEASE ORAL at 07:57

## 2019-03-08 RX ADMIN — FENOFIBRATE 160 MG: 160 TABLET, FILM COATED ORAL at 07:57

## 2019-03-08 RX ADMIN — ARIPIPRAZOLE 10 MG: 10 TABLET ORAL at 07:57

## 2019-03-08 RX ADMIN — METFORMIN HYDROCHLORIDE 1000 MG: 500 TABLET ORAL at 17:52

## 2019-03-08 RX ADMIN — ESCITALOPRAM 20 MG: 20 TABLET, FILM COATED ORAL at 07:57

## 2019-03-08 ASSESSMENT — ACTIVITIES OF DAILY LIVING (ADL)
HYGIENE/GROOMING: INDEPENDENT;PROMPTS
DRESS: SCRUBS (BEHAVIORAL HEALTH);INDEPENDENT
ORAL_HYGIENE: INDEPENDENT
LAUNDRY: UNABLE TO COMPLETE
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
## 2019-03-08 NOTE — PROGRESS NOTES
Patient was visible in the lounge for a short while before dinner talking with staff and watching tv. After dinner he was sent back to his room for the rest of the night where he could isolate himself from everyone and watch TV by himself. Speech is in short bursts and usually incoherent. .

## 2019-03-09 LAB — GLUCOSE BLDC GLUCOMTR-MCNC: 100 MG/DL (ref 70–99)

## 2019-03-09 PROCEDURE — 25000132 ZZH RX MED GY IP 250 OP 250 PS 637: Performed by: PSYCHIATRY & NEUROLOGY

## 2019-03-09 PROCEDURE — 25000132 ZZH RX MED GY IP 250 OP 250 PS 637: Performed by: EMERGENCY MEDICINE

## 2019-03-09 PROCEDURE — 25000132 ZZH RX MED GY IP 250 OP 250 PS 637: Performed by: PHYSICIAN ASSISTANT

## 2019-03-09 PROCEDURE — 00000146 ZZHCL STATISTIC GLUCOSE BY METER IP

## 2019-03-09 PROCEDURE — 12400001 ZZH R&B MH UMMC

## 2019-03-09 RX ADMIN — METFORMIN HYDROCHLORIDE 1000 MG: 500 TABLET ORAL at 08:53

## 2019-03-09 RX ADMIN — BENZTROPINE MESYLATE 0.5 MG: 0.5 TABLET ORAL at 08:55

## 2019-03-09 RX ADMIN — CLOZAPINE 450 MG: 100 TABLET ORAL at 19:40

## 2019-03-09 RX ADMIN — ARIPIPRAZOLE 15 MG: 10 TABLET ORAL at 08:53

## 2019-03-09 RX ADMIN — CLOZAPINE 200 MG: 100 TABLET ORAL at 08:53

## 2019-03-09 RX ADMIN — METFORMIN HYDROCHLORIDE 1000 MG: 500 TABLET ORAL at 17:50

## 2019-03-09 RX ADMIN — ESCITALOPRAM 20 MG: 20 TABLET, FILM COATED ORAL at 08:53

## 2019-03-09 RX ADMIN — SIMVASTATIN 40 MG: 40 TABLET, FILM COATED ORAL at 19:40

## 2019-03-09 RX ADMIN — PANTOPRAZOLE SODIUM 20 MG: 20 TABLET, DELAYED RELEASE ORAL at 08:53

## 2019-03-09 RX ADMIN — NIACIN 500 MG: 500 TABLET, EXTENDED RELEASE ORAL at 19:40

## 2019-03-09 RX ADMIN — PROPRANOLOL HYDROCHLORIDE 80 MG: 80 CAPSULE, EXTENDED RELEASE ORAL at 08:53

## 2019-03-09 RX ADMIN — FENOFIBRATE 160 MG: 160 TABLET, FILM COATED ORAL at 08:52

## 2019-03-09 ASSESSMENT — ACTIVITIES OF DAILY LIVING (ADL)
DRESS: INDEPENDENT
HYGIENE/GROOMING: INDEPENDENT
ORAL_HYGIENE: INDEPENDENT

## 2019-03-09 NOTE — PROGRESS NOTES
"Brayden remains on SIO 1:1 staffing due to aggressive and impulsive behavior and with a 10 foot space restriction from peers and staff. Tonight he reported to staff he was \"seeing people\" and alluded to sexual preoccupations. He remains internally preoccupied, is extremely malodorous and appears responding to AH. He declined to shower, despite attempts but is eating and drinking in good amounts. He did not have any episodes of aggressive behavior or agitation this evening and was compliant with scheduled evening medications. He denies SEs to his medication. Of note he has experienced orthostatic BP drops, but declined assessment of his ortho BP this evening. He was coached on methods of reducing fall risk including slow rising from laying/sitting, increased fluid intake, etc. Will continue to monitor closely.   "

## 2019-03-09 NOTE — PLAN OF CARE
"Brayden was largely in his room this shift watching TV. He was out in the lounge in the afternoon. His behavior was largely appropriate. No sexual comments made, no gestures, no aggression. He took his medications without issue.     He did state that \"the spiritual world is trying to fight my soul\" \"and \"the spirits are telling me to stay in my room.\" He did not overtly appear to be responding to internal stimuli.     Of note, his blood sugar results never populated into Epic even though glucometer stated it had transferred. The blood sugar result from 837am showed \"HI, above 600, however this was an error as there was likely not enough blood. I retook it right after that with sufficient blood and it was 178.   "

## 2019-03-10 LAB
GLUCOSE BLDC GLUCOMTR-MCNC: 171 MG/DL (ref 70–99)
GLUCOSE BLDC GLUCOMTR-MCNC: 178 MG/DL (ref 70–99)
GLUCOSE BLDC GLUCOMTR-MCNC: >600 MG/DL (ref 70–99)

## 2019-03-10 PROCEDURE — 25000132 ZZH RX MED GY IP 250 OP 250 PS 637: Performed by: PSYCHIATRY & NEUROLOGY

## 2019-03-10 PROCEDURE — 00000146 ZZHCL STATISTIC GLUCOSE BY METER IP

## 2019-03-10 PROCEDURE — 25000132 ZZH RX MED GY IP 250 OP 250 PS 637: Performed by: EMERGENCY MEDICINE

## 2019-03-10 PROCEDURE — 12400001 ZZH R&B MH UMMC

## 2019-03-10 RX ADMIN — METFORMIN HYDROCHLORIDE 1000 MG: 500 TABLET ORAL at 08:32

## 2019-03-10 RX ADMIN — ESCITALOPRAM 20 MG: 20 TABLET, FILM COATED ORAL at 08:32

## 2019-03-10 RX ADMIN — PANTOPRAZOLE SODIUM 20 MG: 20 TABLET, DELAYED RELEASE ORAL at 08:32

## 2019-03-10 RX ADMIN — CLOZAPINE 200 MG: 100 TABLET ORAL at 08:32

## 2019-03-10 RX ADMIN — FENOFIBRATE 160 MG: 160 TABLET, FILM COATED ORAL at 08:32

## 2019-03-10 RX ADMIN — CLOZAPINE 450 MG: 100 TABLET ORAL at 20:11

## 2019-03-10 RX ADMIN — METFORMIN HYDROCHLORIDE 1000 MG: 500 TABLET ORAL at 17:24

## 2019-03-10 RX ADMIN — SIMVASTATIN 40 MG: 40 TABLET, FILM COATED ORAL at 20:11

## 2019-03-10 RX ADMIN — NIACIN 500 MG: 500 TABLET, EXTENDED RELEASE ORAL at 20:11

## 2019-03-10 RX ADMIN — ARIPIPRAZOLE 15 MG: 10 TABLET ORAL at 08:32

## 2019-03-10 ASSESSMENT — ACTIVITIES OF DAILY LIVING (ADL)
ORAL_HYGIENE: PROMPTS
DRESS: SCRUBS (BEHAVIORAL HEALTH)
LAUNDRY: UNABLE TO COMPLETE
HYGIENE/GROOMING: PROMPTS
ORAL_HYGIENE: PROMPTS
DRESS: PROMPTS
HYGIENE/GROOMING: PROMPTS

## 2019-03-10 NOTE — PROGRESS NOTES
"Brayden remains on SIO with space restriction from others due to attempts to grab at staff and peers. This evening he reports he continues to experience AH and VH and says he \"sees people in the walls and they are all watching tv with me.\" He says his AH sometimes \"say mean things\" but are otherwise not bothersome to him. He considers his mood \"good\" and says he is \"doing really fine\" despite his ongoing symptoms.     He is observed to have frequent body movements, though denies restlessness. It is unclear if this is related to EPSE (e.g. Akathisia). Brayden says \"I keep my body moving but I cant tell you why.\" He declined to discuss this further.     VSS this evening and without orthostatic hypotension.    "
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
## 2019-03-10 NOTE — PROGRESS NOTES
Pt was calm in his room most hour of the shift watching Tv,had his dinner and went back to his room.asked staff if he coukd get a pencil to write,speech ws incoherent and short. No SI,BIB Observed.

## 2019-03-11 LAB
GLUCOSE BLDC GLUCOMTR-MCNC: 151 MG/DL (ref 70–99)
GLUCOSE BLDC GLUCOMTR-MCNC: 193 MG/DL (ref 70–99)

## 2019-03-11 PROCEDURE — 25000132 ZZH RX MED GY IP 250 OP 250 PS 637: Performed by: EMERGENCY MEDICINE

## 2019-03-11 PROCEDURE — 00000146 ZZHCL STATISTIC GLUCOSE BY METER IP

## 2019-03-11 PROCEDURE — 25000132 ZZH RX MED GY IP 250 OP 250 PS 637: Performed by: PSYCHIATRY & NEUROLOGY

## 2019-03-11 PROCEDURE — 99232 SBSQ HOSP IP/OBS MODERATE 35: CPT | Performed by: PSYCHIATRY & NEUROLOGY

## 2019-03-11 PROCEDURE — 12400001 ZZH R&B MH UMMC

## 2019-03-11 RX ORDER — BENZTROPINE MESYLATE 0.5 MG/1
.5-1 TABLET ORAL 2 TIMES DAILY PRN
Status: DISCONTINUED | OUTPATIENT
Start: 2019-03-11 | End: 2019-04-02 | Stop reason: HOSPADM

## 2019-03-11 RX ADMIN — CLOZAPINE 450 MG: 100 TABLET ORAL at 20:13

## 2019-03-11 RX ADMIN — ARIPIPRAZOLE 15 MG: 10 TABLET ORAL at 07:55

## 2019-03-11 RX ADMIN — SIMVASTATIN 40 MG: 40 TABLET, FILM COATED ORAL at 20:13

## 2019-03-11 RX ADMIN — ESCITALOPRAM 20 MG: 20 TABLET, FILM COATED ORAL at 08:45

## 2019-03-11 RX ADMIN — HYDROXYZINE HYDROCHLORIDE 50 MG: 25 TABLET ORAL at 07:55

## 2019-03-11 RX ADMIN — METFORMIN HYDROCHLORIDE 1000 MG: 500 TABLET ORAL at 18:24

## 2019-03-11 RX ADMIN — FENOFIBRATE 160 MG: 160 TABLET, FILM COATED ORAL at 08:45

## 2019-03-11 RX ADMIN — PANTOPRAZOLE SODIUM 20 MG: 20 TABLET, DELAYED RELEASE ORAL at 08:45

## 2019-03-11 RX ADMIN — METFORMIN HYDROCHLORIDE 1000 MG: 500 TABLET ORAL at 08:45

## 2019-03-11 RX ADMIN — CLOZAPINE 200 MG: 100 TABLET ORAL at 07:55

## 2019-03-11 RX ADMIN — NIACIN 500 MG: 500 TABLET, EXTENDED RELEASE ORAL at 20:13

## 2019-03-11 ASSESSMENT — ACTIVITIES OF DAILY LIVING (ADL)
ORAL_HYGIENE: PROMPTS
LAUNDRY: UNABLE TO COMPLETE
LAUNDRY: UNABLE TO COMPLETE
ORAL_HYGIENE: PROMPTS
HYGIENE/GROOMING: PROMPTS
HYGIENE/GROOMING: PROMPTS
DRESS: SCRUBS (BEHAVIORAL HEALTH)
DRESS: SCRUBS (BEHAVIORAL HEALTH)
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
## 2019-03-11 NOTE — PLAN OF CARE
"Brayden continues to demonstrate significant symptoms of psychosis including AH and VH. He reports VH and points to the corner of the room and says \"that's my , Samuel.\" He says he can see a person in the corner of the room, whom this writer and SIO staff could not. Brayden reports difficulty differentiating AH from the sounds of other patients and staff outside his room but was provided reality orientation and reassurance that there were several people in the lounge which may account for a large portion of what he is hearing. His thinking is disorganized and he is unable to answer certain questions clearly. 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 He is eating and drinking in good amounts, however hygiene is impaired. He is malodorous and refused to shower and says the shower head is \"too powerful.\" He was offered bath wipes which he utilized. BG this evening 171. Continues to report dizziness upon standing and was again reminded of fall prevention strategies including slowly rising, maintaining low level lighting at night, and minimal clutter in his room.   "
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
## 2019-03-12 LAB
GLUCOSE BLDC GLUCOMTR-MCNC: 117 MG/DL (ref 70–99)
GLUCOSE BLDC GLUCOMTR-MCNC: 142 MG/DL (ref 70–99)

## 2019-03-12 PROCEDURE — 00000146 ZZHCL STATISTIC GLUCOSE BY METER IP

## 2019-03-12 PROCEDURE — 25000132 ZZH RX MED GY IP 250 OP 250 PS 637: Performed by: EMERGENCY MEDICINE

## 2019-03-12 PROCEDURE — 12400001 ZZH R&B MH UMMC

## 2019-03-12 PROCEDURE — 25000132 ZZH RX MED GY IP 250 OP 250 PS 637: Performed by: PHYSICIAN ASSISTANT

## 2019-03-12 PROCEDURE — 25000132 ZZH RX MED GY IP 250 OP 250 PS 637: Performed by: PSYCHIATRY & NEUROLOGY

## 2019-03-12 RX ORDER — DIVALPROEX SODIUM 500 MG/1
500 TABLET, DELAYED RELEASE ORAL EVERY 12 HOURS SCHEDULED
Status: DISCONTINUED | OUTPATIENT
Start: 2019-03-12 | End: 2019-03-18

## 2019-03-12 RX ORDER — ARIPIPRAZOLE 10 MG/1
10 TABLET ORAL DAILY
Status: DISCONTINUED | OUTPATIENT
Start: 2019-03-13 | End: 2019-04-02 | Stop reason: HOSPADM

## 2019-03-12 RX ADMIN — SIMVASTATIN 40 MG: 40 TABLET, FILM COATED ORAL at 19:10

## 2019-03-12 RX ADMIN — NIACIN 500 MG: 500 TABLET, EXTENDED RELEASE ORAL at 19:10

## 2019-03-12 RX ADMIN — FENOFIBRATE 160 MG: 160 TABLET, FILM COATED ORAL at 08:17

## 2019-03-12 RX ADMIN — METFORMIN HYDROCHLORIDE 1000 MG: 500 TABLET ORAL at 08:17

## 2019-03-12 RX ADMIN — DIVALPROEX SODIUM 500 MG: 500 TABLET, DELAYED RELEASE ORAL at 19:10

## 2019-03-12 RX ADMIN — PROPRANOLOL HYDROCHLORIDE 80 MG: 80 CAPSULE, EXTENDED RELEASE ORAL at 08:17

## 2019-03-12 RX ADMIN — ARIPIPRAZOLE 15 MG: 10 TABLET ORAL at 08:17

## 2019-03-12 RX ADMIN — ESCITALOPRAM 20 MG: 20 TABLET, FILM COATED ORAL at 08:17

## 2019-03-12 RX ADMIN — CLOZAPINE 200 MG: 100 TABLET ORAL at 08:17

## 2019-03-12 RX ADMIN — CLOZAPINE 450 MG: 100 TABLET ORAL at 19:10

## 2019-03-12 RX ADMIN — METFORMIN HYDROCHLORIDE 1000 MG: 500 TABLET ORAL at 18:12

## 2019-03-12 RX ADMIN — PANTOPRAZOLE SODIUM 20 MG: 20 TABLET, DELAYED RELEASE ORAL at 08:17

## 2019-03-12 ASSESSMENT — ACTIVITIES OF DAILY LIVING (ADL)
ORAL_HYGIENE: PROMPTS
DRESS: SCRUBS (BEHAVIORAL HEALTH)
HYGIENE/GROOMING: PROMPTS
DRESS: SCRUBS (BEHAVIORAL HEALTH)
ORAL_HYGIENE: PROMPTS
HYGIENE/GROOMING: PROMPTS
LAUNDRY: UNABLE TO COMPLETE

## 2019-03-12 NOTE — PROGRESS NOTES
Patient remained in room all shift.  Nurse took a blood glucose test with his full cooperation.  No lunging at staff as he did earlier this shift. Insight is extremely low. Ate meals.
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
## 2019-03-13 LAB
GLUCOSE BLDC GLUCOMTR-MCNC: 113 MG/DL (ref 70–99)
GLUCOSE BLDC GLUCOMTR-MCNC: 203 MG/DL (ref 70–99)

## 2019-03-13 PROCEDURE — 25000132 ZZH RX MED GY IP 250 OP 250 PS 637: Performed by: PSYCHIATRY & NEUROLOGY

## 2019-03-13 PROCEDURE — 25000132 ZZH RX MED GY IP 250 OP 250 PS 637: Performed by: EMERGENCY MEDICINE

## 2019-03-13 PROCEDURE — 00000146 ZZHCL STATISTIC GLUCOSE BY METER IP

## 2019-03-13 PROCEDURE — 12400001 ZZH R&B MH UMMC

## 2019-03-13 RX ADMIN — OLANZAPINE 10 MG: 10 TABLET, FILM COATED ORAL at 13:12

## 2019-03-13 RX ADMIN — DIVALPROEX SODIUM 500 MG: 500 TABLET, DELAYED RELEASE ORAL at 20:44

## 2019-03-13 RX ADMIN — ESCITALOPRAM 20 MG: 20 TABLET, FILM COATED ORAL at 08:51

## 2019-03-13 RX ADMIN — CLOZAPINE 200 MG: 100 TABLET ORAL at 08:51

## 2019-03-13 RX ADMIN — NIACIN 500 MG: 500 TABLET, EXTENDED RELEASE ORAL at 20:44

## 2019-03-13 RX ADMIN — ARIPIPRAZOLE 10 MG: 10 TABLET ORAL at 08:51

## 2019-03-13 RX ADMIN — PANTOPRAZOLE SODIUM 20 MG: 20 TABLET, DELAYED RELEASE ORAL at 08:50

## 2019-03-13 RX ADMIN — DIVALPROEX SODIUM 500 MG: 500 TABLET, DELAYED RELEASE ORAL at 08:51

## 2019-03-13 RX ADMIN — SIMVASTATIN 40 MG: 40 TABLET, FILM COATED ORAL at 20:44

## 2019-03-13 RX ADMIN — METFORMIN HYDROCHLORIDE 1000 MG: 500 TABLET ORAL at 08:51

## 2019-03-13 RX ADMIN — FENOFIBRATE 160 MG: 160 TABLET, FILM COATED ORAL at 08:50

## 2019-03-13 RX ADMIN — METFORMIN HYDROCHLORIDE 1000 MG: 500 TABLET ORAL at 18:23

## 2019-03-13 RX ADMIN — CLOZAPINE 450 MG: 100 TABLET ORAL at 20:44

## 2019-03-13 ASSESSMENT — ACTIVITIES OF DAILY LIVING (ADL)
DRESS: SCRUBS (BEHAVIORAL HEALTH)
HYGIENE/GROOMING: PROMPTS
DRESS: SCRUBS (BEHAVIORAL HEALTH);INDEPENDENT
HYGIENE/GROOMING: PROMPTS
LAUNDRY: UNABLE TO COMPLETE
ORAL_HYGIENE: PROMPTS
LAUNDRY: UNABLE TO COMPLETE
ORAL_HYGIENE: PROMPTS

## 2019-03-13 NOTE — PROVIDER NOTIFICATION
03/13/19 1336   Debriefing   Debriefing DO   Does patient understand why the event happened? Other (comment)   Does patient agree to safe behaviors? Yes   What can we do differently so this doesn't happen again? Other (comment)   Plan of care reviewed and modified Yes     Pt struggling with meaningfully engaging. Answers continue to be nonsensical gnerally. Pt is calm and following directions. Pt walked safely back to his room and seclusion discontinued. Pt continues on SIO for monitoring aggressive behavior.
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
## 2019-03-13 NOTE — PROGRESS NOTES
"Endorses AH and VH this evening, which consisted of a \"monster in my room that makes me go to sleep.\" He verbalized that he was feeling safe, and staff encouraged him to talk to staff if that changes.   SIO and space restriction continues due to unpredictable aggressive behavior. No aggression observed this evening.  "

## 2019-03-14 LAB
ALBUMIN UR-MCNC: NEGATIVE MG/DL
APPEARANCE UR: CLEAR
BILIRUB UR QL STRIP: NEGATIVE
COLOR UR AUTO: NORMAL
GLUCOSE BLDC GLUCOMTR-MCNC: 107 MG/DL (ref 70–99)
GLUCOSE BLDC GLUCOMTR-MCNC: 139 MG/DL (ref 70–99)
GLUCOSE UR STRIP-MCNC: NEGATIVE MG/DL
HGB UR QL STRIP: NEGATIVE
KETONES UR STRIP-MCNC: NEGATIVE MG/DL
LEUKOCYTE ESTERASE UR QL STRIP: NEGATIVE
NITRATE UR QL: NEGATIVE
PH UR STRIP: 7 PH (ref 5–7)
RBC #/AREA URNS AUTO: <1 /HPF (ref 0–2)
SOURCE: NORMAL
SP GR UR STRIP: 1 (ref 1–1.03)
UROBILINOGEN UR STRIP-MCNC: NORMAL MG/DL (ref 0–2)
WBC #/AREA URNS AUTO: <1 /HPF (ref 0–5)

## 2019-03-14 PROCEDURE — 99233 SBSQ HOSP IP/OBS HIGH 50: CPT | Mod: GC | Performed by: PSYCHIATRY & NEUROLOGY

## 2019-03-14 PROCEDURE — 25000132 ZZH RX MED GY IP 250 OP 250 PS 637: Performed by: PHYSICIAN ASSISTANT

## 2019-03-14 PROCEDURE — 25000132 ZZH RX MED GY IP 250 OP 250 PS 637: Performed by: EMERGENCY MEDICINE

## 2019-03-14 PROCEDURE — 81001 URINALYSIS AUTO W/SCOPE: CPT | Performed by: STUDENT IN AN ORGANIZED HEALTH CARE EDUCATION/TRAINING PROGRAM

## 2019-03-14 PROCEDURE — 00000146 ZZHCL STATISTIC GLUCOSE BY METER IP

## 2019-03-14 PROCEDURE — 12400001 ZZH R&B MH UMMC

## 2019-03-14 PROCEDURE — 25000132 ZZH RX MED GY IP 250 OP 250 PS 637: Performed by: PSYCHIATRY & NEUROLOGY

## 2019-03-14 RX ADMIN — ESCITALOPRAM 20 MG: 20 TABLET, FILM COATED ORAL at 08:38

## 2019-03-14 RX ADMIN — DIVALPROEX SODIUM 500 MG: 500 TABLET, DELAYED RELEASE ORAL at 19:02

## 2019-03-14 RX ADMIN — SIMVASTATIN 40 MG: 40 TABLET, FILM COATED ORAL at 19:02

## 2019-03-14 RX ADMIN — METFORMIN HYDROCHLORIDE 1000 MG: 500 TABLET ORAL at 17:48

## 2019-03-14 RX ADMIN — FENOFIBRATE 160 MG: 160 TABLET, FILM COATED ORAL at 08:38

## 2019-03-14 RX ADMIN — NIACIN 500 MG: 500 TABLET, EXTENDED RELEASE ORAL at 19:02

## 2019-03-14 RX ADMIN — PANTOPRAZOLE SODIUM 20 MG: 20 TABLET, DELAYED RELEASE ORAL at 08:38

## 2019-03-14 RX ADMIN — PROPRANOLOL HYDROCHLORIDE 80 MG: 80 CAPSULE, EXTENDED RELEASE ORAL at 08:56

## 2019-03-14 RX ADMIN — DIVALPROEX SODIUM 500 MG: 500 TABLET, DELAYED RELEASE ORAL at 08:38

## 2019-03-14 RX ADMIN — CLOZAPINE 200 MG: 100 TABLET ORAL at 08:38

## 2019-03-14 RX ADMIN — OLANZAPINE 10 MG: 10 TABLET, FILM COATED ORAL at 13:50

## 2019-03-14 RX ADMIN — CLOZAPINE 450 MG: 100 TABLET ORAL at 19:02

## 2019-03-14 RX ADMIN — HYDROXYZINE HYDROCHLORIDE 50 MG: 25 TABLET ORAL at 13:50

## 2019-03-14 RX ADMIN — METFORMIN HYDROCHLORIDE 1000 MG: 500 TABLET ORAL at 08:38

## 2019-03-14 RX ADMIN — ARIPIPRAZOLE 10 MG: 10 TABLET ORAL at 08:38

## 2019-03-14 ASSESSMENT — ACTIVITIES OF DAILY LIVING (ADL)
ORAL_HYGIENE: INDEPENDENT
LAUNDRY: UNABLE TO COMPLETE
DRESS: SCRUBS (BEHAVIORAL HEALTH)
LAUNDRY: UNABLE TO COMPLETE
HYGIENE/GROOMING: PROMPTS
DRESS: INDEPENDENT
HYGIENE/GROOMING: INDEPENDENT
ORAL_HYGIENE: PROMPTS
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
## 2019-03-14 NOTE — PLAN OF CARE
Behavioral Disturbance  3/13/2019 2135 - No Change by Carlos Melchor, RN    Patient continues  on SIO 1:1 male only, with a 10 ft space restriction.  He was isolative to his room.  He endorsed auditory hallucinations, ?They are making fun of me?.  He endorsed visual hallucinations, but would not elaborate further.  He stated ?It feels like there is someone inside of me, watching TV?.  He had sexually-themed responses to some questions.  He had a flat affect.  No agitation, or aggression noted.   Patient was malodorous and disheveled.  He refused to shower.    Patient was compliant with HS medications.  He denied acute medical concerns.  He had EPSE, but did not want a PRN.

## 2019-03-14 NOTE — PROGRESS NOTES
"Patient was more responsive to staff redirection today.  For example, when patient stood at door and appeared potentially aggressive, he sat down on his bed when requested by staff.  He followed similar directions as well.  He once stated that he was \"paranoid.\" He stated he would take a shower during reflection time this afternoon, though he will need to be prompted at that time.  "
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
## 2019-03-15 LAB
GLUCOSE BLDC GLUCOMTR-MCNC: 132 MG/DL (ref 70–99)
GLUCOSE BLDC GLUCOMTR-MCNC: 145 MG/DL (ref 70–99)

## 2019-03-15 PROCEDURE — 25000132 ZZH RX MED GY IP 250 OP 250 PS 637: Performed by: PHYSICIAN ASSISTANT

## 2019-03-15 PROCEDURE — 25000128 H RX IP 250 OP 636: Performed by: PSYCHIATRY & NEUROLOGY

## 2019-03-15 PROCEDURE — 12400001 ZZH R&B MH UMMC

## 2019-03-15 PROCEDURE — 25000132 ZZH RX MED GY IP 250 OP 250 PS 637: Performed by: EMERGENCY MEDICINE

## 2019-03-15 PROCEDURE — 99233 SBSQ HOSP IP/OBS HIGH 50: CPT | Mod: GC | Performed by: PSYCHIATRY & NEUROLOGY

## 2019-03-15 PROCEDURE — 25000132 ZZH RX MED GY IP 250 OP 250 PS 637: Performed by: PSYCHIATRY & NEUROLOGY

## 2019-03-15 PROCEDURE — 00000146 ZZHCL STATISTIC GLUCOSE BY METER IP

## 2019-03-15 RX ADMIN — ESCITALOPRAM 20 MG: 20 TABLET, FILM COATED ORAL at 08:38

## 2019-03-15 RX ADMIN — DIVALPROEX SODIUM 500 MG: 500 TABLET, DELAYED RELEASE ORAL at 19:54

## 2019-03-15 RX ADMIN — METFORMIN HYDROCHLORIDE 1000 MG: 500 TABLET ORAL at 18:21

## 2019-03-15 RX ADMIN — DIVALPROEX SODIUM 500 MG: 500 TABLET, DELAYED RELEASE ORAL at 08:37

## 2019-03-15 RX ADMIN — CLOZAPINE 450 MG: 100 TABLET ORAL at 19:54

## 2019-03-15 RX ADMIN — CLOZAPINE 200 MG: 100 TABLET ORAL at 08:37

## 2019-03-15 RX ADMIN — FENOFIBRATE 160 MG: 160 TABLET, FILM COATED ORAL at 08:37

## 2019-03-15 RX ADMIN — PROPRANOLOL HYDROCHLORIDE 80 MG: 80 CAPSULE, EXTENDED RELEASE ORAL at 08:37

## 2019-03-15 RX ADMIN — NIACIN 500 MG: 500 TABLET, EXTENDED RELEASE ORAL at 19:54

## 2019-03-15 RX ADMIN — METFORMIN HYDROCHLORIDE 1000 MG: 500 TABLET ORAL at 08:37

## 2019-03-15 RX ADMIN — SIMVASTATIN 40 MG: 40 TABLET, FILM COATED ORAL at 19:54

## 2019-03-15 RX ADMIN — MEDROXYPROGESTERONE ACETATE 150 MG: 150 INJECTION, SUSPENSION INTRAMUSCULAR at 18:37

## 2019-03-15 RX ADMIN — PANTOPRAZOLE SODIUM 20 MG: 20 TABLET, DELAYED RELEASE ORAL at 08:37

## 2019-03-15 RX ADMIN — ARIPIPRAZOLE 10 MG: 10 TABLET ORAL at 08:39

## 2019-03-15 ASSESSMENT — ACTIVITIES OF DAILY LIVING (ADL)
LAUNDRY: UNABLE TO COMPLETE
HYGIENE/GROOMING: INDEPENDENT;PROMPTS
HYGIENE/GROOMING: INDEPENDENT;PROMPTS
LAUNDRY: UNABLE TO COMPLETE
DRESS: SCRUBS (BEHAVIORAL HEALTH);INDEPENDENT
ORAL_HYGIENE: INDEPENDENT;PROMPTS
DRESS: SCRUBS (BEHAVIORAL HEALTH)
ORAL_HYGIENE: INDEPENDENT;PROMPTS

## 2019-03-15 NOTE — SIGNIFICANT EVENT
"Brayden suddenly got out of bed and rushed towards the door of his room. This writer made eye contact and asked \"How can I help you, Mason?\" Mason abruptly turned around and got back into bed. Brayden may have had aggressive intent in rushing the door.  "

## 2019-03-15 NOTE — PROGRESS NOTES
Writer alerted that patient charged his room door x 1 late in the evening.  Staff verbally redirected the patient and he returned to his bed without further incident.

## 2019-03-15 NOTE — PLAN OF CARE
"48 hour nursing assessment    SI/SIB/HI: denies  Hallucinations: auditory and visual. Pt states he sees and hears his \"spiritual ally\", and that this is \"obnoxous, and calming\" to him. Pt does not appear to be responding to internal stimuli at this time.   Depression: denies  Anxiety: denies  Other symptoms reported: pt reports feeling \"paranoid\". Unable to elaborate further.    Pt has been isolative to his room this shift. Pt took a shower with prompting from staff. Pt was compliant with VS assessment, BG and medications. Pt did not make any aggressive movements toward anyone today. Pt has not made any inappropriate comments toward anyone either. No other concerns at this time. Nursing will continue to monitor and assess.   "
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
## 2019-03-15 NOTE — PROGRESS NOTES
"Pt is isolative and withdrawn from staff. Pt spent most the evening watching tv. Pt endorses AH and VH. Pt did not have any aggressive behavior tonight. Pt denies SI, and SIB. Pts answers during check in were short, \"yes or no\" Pt appears tense when checking in this evening and appears relaxed after this writer exited his room.      03/14/19 2038   Behavioral Health   Hallucinations auditory;visual   Thinking poor concentration;delusional   Orientation person: oriented;place: oriented   Memory baseline memory   Insight poor   Judgement impaired   Eye Contact at examiner   Affect blunted, flat   Mood mood is calm   Physical Appearance/Attire disheveled;untidy   Hygiene neglected grooming - unclean body, hair, teeth   Suicidality other (see comments)  (none stated none observed)   1. Wish to be Dead No   2. Non-Specific Active Suicidal Thoughts  No   Self Injury other (see comment)  (none stated none observed)   Activity isolative   Speech clear;coherent  (concrete)   Medication Sensitivity no observed side effects;no stated side effects   Psychomotor / Gait steady;balanced   Activities of Daily Living   Hygiene/Grooming independent   Oral Hygiene independent   Dress scrubs (behavioral health)   Laundry unable to complete   Room Organization independent     "

## 2019-03-16 LAB
GLUCOSE BLDC GLUCOMTR-MCNC: 135 MG/DL (ref 70–99)
GLUCOSE BLDC GLUCOMTR-MCNC: 148 MG/DL (ref 70–99)

## 2019-03-16 PROCEDURE — 12400001 ZZH R&B MH UMMC

## 2019-03-16 PROCEDURE — 25000132 ZZH RX MED GY IP 250 OP 250 PS 637: Performed by: EMERGENCY MEDICINE

## 2019-03-16 PROCEDURE — 25000132 ZZH RX MED GY IP 250 OP 250 PS 637: Performed by: PSYCHIATRY & NEUROLOGY

## 2019-03-16 PROCEDURE — 00000146 ZZHCL STATISTIC GLUCOSE BY METER IP

## 2019-03-16 PROCEDURE — 25000132 ZZH RX MED GY IP 250 OP 250 PS 637: Performed by: PHYSICIAN ASSISTANT

## 2019-03-16 RX ADMIN — PROPRANOLOL HYDROCHLORIDE 80 MG: 80 CAPSULE, EXTENDED RELEASE ORAL at 08:40

## 2019-03-16 RX ADMIN — DIVALPROEX SODIUM 500 MG: 500 TABLET, DELAYED RELEASE ORAL at 08:38

## 2019-03-16 RX ADMIN — CLOZAPINE 450 MG: 100 TABLET ORAL at 19:54

## 2019-03-16 RX ADMIN — CLOZAPINE 200 MG: 100 TABLET ORAL at 08:39

## 2019-03-16 RX ADMIN — ARIPIPRAZOLE 10 MG: 10 TABLET ORAL at 08:39

## 2019-03-16 RX ADMIN — ESCITALOPRAM 20 MG: 20 TABLET, FILM COATED ORAL at 08:39

## 2019-03-16 RX ADMIN — NIACIN 500 MG: 500 TABLET, EXTENDED RELEASE ORAL at 19:55

## 2019-03-16 RX ADMIN — DIVALPROEX SODIUM 500 MG: 500 TABLET, DELAYED RELEASE ORAL at 19:55

## 2019-03-16 RX ADMIN — FENOFIBRATE 160 MG: 160 TABLET, FILM COATED ORAL at 08:39

## 2019-03-16 RX ADMIN — SIMVASTATIN 40 MG: 40 TABLET, FILM COATED ORAL at 19:55

## 2019-03-16 RX ADMIN — PANTOPRAZOLE SODIUM 20 MG: 20 TABLET, DELAYED RELEASE ORAL at 08:39

## 2019-03-16 RX ADMIN — METFORMIN HYDROCHLORIDE 1000 MG: 500 TABLET ORAL at 08:38

## 2019-03-16 RX ADMIN — METFORMIN HYDROCHLORIDE 1000 MG: 500 TABLET ORAL at 18:13

## 2019-03-16 ASSESSMENT — ACTIVITIES OF DAILY LIVING (ADL)
ORAL_HYGIENE: INDEPENDENT;PROMPTS
ORAL_HYGIENE: INDEPENDENT
DRESS: SCRUBS (BEHAVIORAL HEALTH);INDEPENDENT
HYGIENE/GROOMING: INDEPENDENT;PROMPTS
LAUNDRY: UNABLE TO COMPLETE
DRESS: SCRUBS (BEHAVIORAL HEALTH)
HYGIENE/GROOMING: INDEPENDENT;PROMPTS

## 2019-03-16 NOTE — PROGRESS NOTES
Pt had a pleasant evening shift.  He was isolative to his room due to his space restriction. He continues on status individual observation due to his unpredictable aggression towards staff. No episodes of agitation or aggression this evening. Pt blood glucose is 145 at 1705 hrs. Good appetite. Pt remains compliant with scheduled medication and denies SE s.  IM medroxyprogesterone was also given this shift.  Poor ADL s maintenance. No SI/SIB.

## 2019-03-16 NOTE — PROGRESS NOTES
Pt began shift watching TV in [employer] during reflection time. Pt endorses AH/VH. Pt denies SI/SIB. Pt speech continues to consist of random & nonsensical phrases. Pt affect blunted/flat & mood was calm. There were no aggressive behaviors or any other significant behavioral events during this shift.     03/15/19 2104   Behavioral Health   Hallucinations auditory;visual   Thinking delusional;paranoid;poor concentration   Orientation person: oriented;place: oriented   Memory baseline memory   Insight poor   Judgement impaired   Eye Contact at examiner   Affect blunted, flat   Mood mood is calm   Physical Appearance/Attire disheveled   Hygiene other (see comment)  (adequate)   Suicidality other (see comments)  (denies)   1. Wish to be Dead No   2. Non-Specific Active Suicidal Thoughts  No   Self Injury other (see comment)  (none stated; none observed)   Elopement (none observed)   Activity isolative   Speech clear;other (see comments)  (nonesensical content)   Medication Sensitivity no stated side effects;no observed side effects   Psychomotor / Gait balanced;steady   Psycho Education   Type of Intervention 1:1 intervention   Response unavailable   Treatment Detail (check-in)   Activities of Daily Living   Hygiene/Grooming independent;prompts   Oral Hygiene independent;prompts   Dress scrubs (behavioral health);independent   Laundry unable to complete   Room Organization prompts
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
## 2019-03-16 NOTE — PROGRESS NOTES
Patient upon approach for AM medications and vitals initially calm and able to follow directions and stated understanding of expectations. Patient cooperative with vitals while sitting and accepting of AM medications. Due to PT recent aggressive behavior staff held his arms to ensure safety while standing to assess orthostatic BP and weight. Shortly after standing patient abruptly tensed up and attempted to fights staffs hold of his arms. Patient with verbal redirection was able to calm and follow direction to lay back down on bed to facilitate safe exit out of room for staff. Unable to assess weight and orthostatic BP at this time. Patient not symptomatic of orthostatic changes. Will continue to monitor.

## 2019-03-17 LAB
GLUCOSE BLDC GLUCOMTR-MCNC: 112 MG/DL (ref 70–99)
GLUCOSE BLDC GLUCOMTR-MCNC: 121 MG/DL (ref 70–99)

## 2019-03-17 PROCEDURE — 25000132 ZZH RX MED GY IP 250 OP 250 PS 637: Performed by: EMERGENCY MEDICINE

## 2019-03-17 PROCEDURE — 00000146 ZZHCL STATISTIC GLUCOSE BY METER IP

## 2019-03-17 PROCEDURE — 25000132 ZZH RX MED GY IP 250 OP 250 PS 637: Performed by: PSYCHIATRY & NEUROLOGY

## 2019-03-17 PROCEDURE — 12400001 ZZH R&B MH UMMC

## 2019-03-17 PROCEDURE — 25000132 ZZH RX MED GY IP 250 OP 250 PS 637: Performed by: PHYSICIAN ASSISTANT

## 2019-03-17 RX ADMIN — METFORMIN HYDROCHLORIDE 1000 MG: 500 TABLET ORAL at 08:13

## 2019-03-17 RX ADMIN — DIVALPROEX SODIUM 500 MG: 500 TABLET, DELAYED RELEASE ORAL at 19:05

## 2019-03-17 RX ADMIN — FENOFIBRATE 160 MG: 160 TABLET, FILM COATED ORAL at 08:13

## 2019-03-17 RX ADMIN — SIMVASTATIN 40 MG: 40 TABLET, FILM COATED ORAL at 19:06

## 2019-03-17 RX ADMIN — PROPRANOLOL HYDROCHLORIDE 80 MG: 80 CAPSULE, EXTENDED RELEASE ORAL at 08:13

## 2019-03-17 RX ADMIN — ESCITALOPRAM 20 MG: 20 TABLET, FILM COATED ORAL at 08:14

## 2019-03-17 RX ADMIN — PANTOPRAZOLE SODIUM 20 MG: 20 TABLET, DELAYED RELEASE ORAL at 08:14

## 2019-03-17 RX ADMIN — METFORMIN HYDROCHLORIDE 1000 MG: 500 TABLET ORAL at 18:23

## 2019-03-17 RX ADMIN — DIVALPROEX SODIUM 500 MG: 500 TABLET, DELAYED RELEASE ORAL at 08:14

## 2019-03-17 RX ADMIN — CLOZAPINE 200 MG: 100 TABLET ORAL at 08:14

## 2019-03-17 RX ADMIN — CLOZAPINE 450 MG: 100 TABLET ORAL at 19:06

## 2019-03-17 RX ADMIN — ARIPIPRAZOLE 10 MG: 10 TABLET ORAL at 08:13

## 2019-03-17 RX ADMIN — NIACIN 500 MG: 500 TABLET, EXTENDED RELEASE ORAL at 19:05

## 2019-03-17 ASSESSMENT — ACTIVITIES OF DAILY LIVING (ADL)
LAUNDRY: UNABLE TO COMPLETE
ORAL_HYGIENE: INDEPENDENT;PROMPTS
HYGIENE/GROOMING: INDEPENDENT;PROMPTS
DRESS: SCRUBS (BEHAVIORAL HEALTH);INDEPENDENT

## 2019-03-17 NOTE — PLAN OF CARE
Brayden had a pleasant day shift. No episodes of aggression or agitation. He spent most of this morning resting in bed. Blood glucose this morning 121. Pt is disorganized and continues making delusional statements. Pt hygiene maintenance remains poor; he refused to shower this morning. Pt remains compliant with scheduled medication and denies SE?s.

## 2019-03-17 NOTE — PLAN OF CARE
No episodes of aggression or agitation towards staff this evening. Pt was calm, pleasant and cooperative with care.  Pt was isolative to his room due to space restriction and unpredictable, aggressive behaviors. Pt took scheduled medication without any issue and denied SE's. Blood glucose this evening 148. Appetite remains good while hygiene maintenance was poor. Pt is on SIO with space restriction. NO SI/SIB.

## 2019-03-18 LAB
GLUCOSE BLDC GLUCOMTR-MCNC: 125 MG/DL (ref 70–99)
GLUCOSE BLDC GLUCOMTR-MCNC: 133 MG/DL (ref 70–99)
GLUCOSE BLDC GLUCOMTR-MCNC: 152 MG/DL (ref 70–99)
VALPROATE SERPL-MCNC: 58 MG/L (ref 50–100)

## 2019-03-18 PROCEDURE — 25000132 ZZH RX MED GY IP 250 OP 250 PS 637: Performed by: EMERGENCY MEDICINE

## 2019-03-18 PROCEDURE — 36415 COLL VENOUS BLD VENIPUNCTURE: CPT | Performed by: STUDENT IN AN ORGANIZED HEALTH CARE EDUCATION/TRAINING PROGRAM

## 2019-03-18 PROCEDURE — 00000146 ZZHCL STATISTIC GLUCOSE BY METER IP

## 2019-03-18 PROCEDURE — 12400001 ZZH R&B MH UMMC

## 2019-03-18 PROCEDURE — 25000132 ZZH RX MED GY IP 250 OP 250 PS 637: Performed by: PSYCHIATRY & NEUROLOGY

## 2019-03-18 PROCEDURE — 99232 SBSQ HOSP IP/OBS MODERATE 35: CPT | Mod: GC | Performed by: PSYCHIATRY & NEUROLOGY

## 2019-03-18 PROCEDURE — 80164 ASSAY DIPROPYLACETIC ACD TOT: CPT | Performed by: STUDENT IN AN ORGANIZED HEALTH CARE EDUCATION/TRAINING PROGRAM

## 2019-03-18 PROCEDURE — 25000132 ZZH RX MED GY IP 250 OP 250 PS 637: Performed by: STUDENT IN AN ORGANIZED HEALTH CARE EDUCATION/TRAINING PROGRAM

## 2019-03-18 PROCEDURE — 25000132 ZZH RX MED GY IP 250 OP 250 PS 637: Performed by: PHYSICIAN ASSISTANT

## 2019-03-18 RX ORDER — DIVALPROEX SODIUM 500 MG/1
1000 TABLET, DELAYED RELEASE ORAL AT BEDTIME
Status: DISCONTINUED | OUTPATIENT
Start: 2019-03-18 | End: 2019-04-02 | Stop reason: HOSPADM

## 2019-03-18 RX ORDER — DIVALPROEX SODIUM 500 MG/1
500 TABLET, DELAYED RELEASE ORAL DAILY
Status: DISCONTINUED | OUTPATIENT
Start: 2019-03-19 | End: 2019-04-02 | Stop reason: HOSPADM

## 2019-03-18 RX ADMIN — NIACIN 500 MG: 500 TABLET, EXTENDED RELEASE ORAL at 19:35

## 2019-03-18 RX ADMIN — PROPRANOLOL HYDROCHLORIDE 80 MG: 80 CAPSULE, EXTENDED RELEASE ORAL at 08:45

## 2019-03-18 RX ADMIN — METFORMIN HYDROCHLORIDE 1000 MG: 500 TABLET ORAL at 08:45

## 2019-03-18 RX ADMIN — METFORMIN HYDROCHLORIDE 1000 MG: 500 TABLET ORAL at 18:40

## 2019-03-18 RX ADMIN — DIVALPROEX SODIUM 500 MG: 500 TABLET, DELAYED RELEASE ORAL at 08:45

## 2019-03-18 RX ADMIN — FENOFIBRATE 160 MG: 160 TABLET, FILM COATED ORAL at 08:45

## 2019-03-18 RX ADMIN — DIVALPROEX SODIUM 1000 MG: 500 TABLET, DELAYED RELEASE ORAL at 19:35

## 2019-03-18 RX ADMIN — CLOZAPINE 450 MG: 100 TABLET ORAL at 19:35

## 2019-03-18 RX ADMIN — ARIPIPRAZOLE 10 MG: 10 TABLET ORAL at 08:46

## 2019-03-18 RX ADMIN — ESCITALOPRAM 20 MG: 20 TABLET, FILM COATED ORAL at 08:45

## 2019-03-18 RX ADMIN — CLOZAPINE 200 MG: 100 TABLET ORAL at 08:45

## 2019-03-18 RX ADMIN — PANTOPRAZOLE SODIUM 20 MG: 20 TABLET, DELAYED RELEASE ORAL at 08:45

## 2019-03-18 RX ADMIN — SIMVASTATIN 40 MG: 40 TABLET, FILM COATED ORAL at 19:36

## 2019-03-18 ASSESSMENT — ACTIVITIES OF DAILY LIVING (ADL)
DRESS: INDEPENDENT
HYGIENE/GROOMING: INDEPENDENT;PROMPTS
ORAL_HYGIENE: INDEPENDENT
ORAL_HYGIENE: INDEPENDENT
HYGIENE/GROOMING: INDEPENDENT
LAUNDRY: UNABLE TO COMPLETE
DRESS: INDEPENDENT;PROMPTS
LAUNDRY: UNABLE TO COMPLETE

## 2019-03-18 NOTE — PROGRESS NOTES
BEHAVIORAL TEAM DISCUSSION    Participants: Dr. Gilmore, Dr. Solis, RN Carlos and STELLA Brown, [facility] [place]  Progress: minimal improvement  Continued Stay Criteria/Rationale: needs further stabilization  Medical/Physical: see medical note  Precautions:   Behavioral Orders   Procedures     Assault precautions     Code 1 - Restrict to Unit     Routine Programming     As clinically indicated     Sexual precautions     Status 15     Every 15 minutes.     Status Individual Observation     Pt is on 10ft space restriction due to aggressive behaviors towards nursing staff and male staff only due to hyper-sexual behaviors towards female staff.     Order Specific Question:   CONTINUOUS 24 hours / day     Answer:   5 feet     Order Specific Question:   Indications for SIO     Answer:   Assault risk     Suicide precautions     Patients on Suicide Precautions should have a Combination Diet ordered that includes a Diet selection(s) AND a Behavioral Tray selection for Safe Tray - with utensils, or Safe Tray - NO utensils       Plan: medication increase to target symptoms, team to contact father/guardian with update  Rationale for change in precautions or plan: no change

## 2019-03-18 NOTE — PROGRESS NOTES
Brayden had a pleasant evening shift. No episodes of aggression or agitation. He spent most of this evening in his room resting in bed. Pt blood glucose this evening [id number].  Pt is disorganized and delusional. Pt hygiene maintenance remains poor; he refused to shower. Pt remains compliant with scheduled medication and denies SE s. Pt continues on SIO with space restriction.

## 2019-03-18 NOTE — PROGRESS NOTES
03/18/19 1400   Behavioral Health   Hallucinations auditory;appears responding   Thinking poor concentration;paranoid;delusional;distractable   Orientation person: oriented   Memory baseline memory   Insight poor   Judgement impaired   Eye Contact at examiner   Affect blunted, flat   Mood anxious;depressed   Physical Appearance/Attire disheveled   Hygiene neglected grooming - unclean body, hair, teeth   Suicidality other (see comments)  (denies)   1. Wish to be Dead No   2. Non-Specific Active Suicidal Thoughts  No   Self Injury thoughts only   Elopement (no value)   Activity isolative;withdrawn   Speech clear;coherent   Medication Sensitivity no observed side effects;no stated side effects   Psychomotor / Gait balanced;steady   Activities of Daily Living   Hygiene/Grooming independent;prompts   Oral Hygiene independent   Dress independent;prompts   Laundry unable to complete   Room Organization prompts         Patient is anxious, depressed and delusional, pt in room the whole day shift. Pt is hearing voices and says they do not stop. No SI but is having thoughts of SIB.

## 2019-03-18 NOTE — PROGRESS NOTES
"Pt isolated to room for entire shift. Pt continues to make brief, nonsensical statements using clear speech (\"I want an orange, with no orange in it\"). Pt denies SI/SIB; endorses AH/VH. Pt did not perform any personal hygiene tasks during this shift. Pt had no aggressive behavioral events during this shift.     03/17/19 5762   Behavioral Health   Hallucinations auditory;visual   Thinking poor concentration;distractable   Orientation person: oriented;place: oriented   Memory baseline memory   Insight poor   Judgement impaired   Eye Contact at examiner;staring;into space   Affect blunted, flat   Mood mood is calm   Physical Appearance/Attire untidy;disheveled   Hygiene neglected grooming - unclean body, hair, teeth   Suicidality other (see comments)  (denies)   1. Wish to be Dead No   2. Non-Specific Active Suicidal Thoughts  No   Self Injury other (see comment)  (none observed)   Elopement (none observed)   Activity isolative;withdrawn   Speech clear;other (see comments)  (nonsensical content)   Medication Sensitivity no stated side effects;no observed side effects   Psychomotor / Gait balanced;steady   Psycho Education   Type of Intervention 1:1 intervention   Response participates with cues/redirection   Hours 0.5   Treatment Detail (check-in)   Activities of Daily Living   Hygiene/Grooming independent;prompts   Oral Hygiene independent;prompts   Dress scrubs (behavioral health);independent   Laundry unable to complete   Room Organization prompts     "
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
## 2019-03-19 LAB
GLUCOSE BLDC GLUCOMTR-MCNC: 140 MG/DL (ref 70–99)
GLUCOSE BLDC GLUCOMTR-MCNC: 154 MG/DL (ref 70–99)

## 2019-03-19 PROCEDURE — 12400001 ZZH R&B MH UMMC

## 2019-03-19 PROCEDURE — 25000132 ZZH RX MED GY IP 250 OP 250 PS 637: Performed by: PSYCHIATRY & NEUROLOGY

## 2019-03-19 PROCEDURE — 25000132 ZZH RX MED GY IP 250 OP 250 PS 637: Performed by: EMERGENCY MEDICINE

## 2019-03-19 PROCEDURE — 25000132 ZZH RX MED GY IP 250 OP 250 PS 637: Performed by: PHYSICIAN ASSISTANT

## 2019-03-19 PROCEDURE — 99232 SBSQ HOSP IP/OBS MODERATE 35: CPT | Mod: GC | Performed by: PSYCHIATRY & NEUROLOGY

## 2019-03-19 PROCEDURE — 00000146 ZZHCL STATISTIC GLUCOSE BY METER IP

## 2019-03-19 PROCEDURE — 25000132 ZZH RX MED GY IP 250 OP 250 PS 637: Performed by: STUDENT IN AN ORGANIZED HEALTH CARE EDUCATION/TRAINING PROGRAM

## 2019-03-19 RX ORDER — LOPERAMIDE HCL 2 MG
2 CAPSULE ORAL 4 TIMES DAILY PRN
Status: DISCONTINUED | OUTPATIENT
Start: 2019-03-19 | End: 2019-04-02 | Stop reason: HOSPADM

## 2019-03-19 RX ADMIN — METFORMIN HYDROCHLORIDE 1000 MG: 500 TABLET ORAL at 16:57

## 2019-03-19 RX ADMIN — METFORMIN HYDROCHLORIDE 1000 MG: 500 TABLET ORAL at 08:45

## 2019-03-19 RX ADMIN — DIVALPROEX SODIUM 1000 MG: 500 TABLET, DELAYED RELEASE ORAL at 19:30

## 2019-03-19 RX ADMIN — CLOZAPINE 450 MG: 100 TABLET ORAL at 19:30

## 2019-03-19 RX ADMIN — NIACIN 500 MG: 500 TABLET, EXTENDED RELEASE ORAL at 19:30

## 2019-03-19 RX ADMIN — CLOZAPINE 200 MG: 100 TABLET ORAL at 08:45

## 2019-03-19 RX ADMIN — PANTOPRAZOLE SODIUM 20 MG: 20 TABLET, DELAYED RELEASE ORAL at 08:45

## 2019-03-19 RX ADMIN — SIMVASTATIN 40 MG: 40 TABLET, FILM COATED ORAL at 19:30

## 2019-03-19 RX ADMIN — PROPRANOLOL HYDROCHLORIDE 80 MG: 80 CAPSULE, EXTENDED RELEASE ORAL at 08:46

## 2019-03-19 RX ADMIN — ESCITALOPRAM 20 MG: 20 TABLET, FILM COATED ORAL at 08:45

## 2019-03-19 RX ADMIN — ARIPIPRAZOLE 10 MG: 10 TABLET ORAL at 08:45

## 2019-03-19 RX ADMIN — DIVALPROEX SODIUM 500 MG: 500 TABLET, DELAYED RELEASE ORAL at 08:45

## 2019-03-19 RX ADMIN — FENOFIBRATE 160 MG: 160 TABLET, FILM COATED ORAL at 08:45

## 2019-03-19 ASSESSMENT — ACTIVITIES OF DAILY LIVING (ADL)
ORAL_HYGIENE: INDEPENDENT;PROMPTS
HYGIENE/GROOMING: INDEPENDENT;PROMPTS
LAUNDRY: UNABLE TO COMPLETE
ORAL_HYGIENE: INDEPENDENT
DRESS: SCRUBS (BEHAVIORAL HEALTH)
HYGIENE/GROOMING: INDEPENDENT;PROMPTS
DRESS: INDEPENDENT

## 2019-03-19 NOTE — PLAN OF CARE
"Brayden continues to present with psychotic symptoms including somatic delusions, and preoccupations, impulsive behavior and hallucinations. He reports \"anus pain\" and spasms today which has occurred for some time but he also notes new onset loose stool. He reports this began a day or two prior but his complaints are otherwise non-specific and he is unable to describe further or additional symptoms associated with this. He reports ongoing VH and has minimal insight into the impulsive nature of his ongoing behavioral concerns (e.g. Attempts to grab at staff which have been ongoing). He was compliant with BG checks, and medications this evening. On call provider contacted to discuss loose stools, and PRN Imodium ordered. When medication was available, pt reported episodes had ceased and declined PRN Imodium but is aware it is available should he experience diarrhea again.   "
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
## 2019-03-19 NOTE — PLAN OF CARE
"  Pt presents with poor concentration, distractible thinking, paranoia and delusional thought pattern.    In reference to appetite, Pt states \"I'm real hungry.\" In reference to sleep hygiene, Pt states \"I'm not sleeping well.\" Pt explains he is having difficulty falling asleep and states \"I wake up early in the morning.\"     When asked if Pt is experiencing any adverse/side effects to medications, Pt states \"Someone is taking meds out of my stomach.\" No other adverse/side effects to medications stated by Pt or seen by staff.    Pt explains he has been feeling \"anxious, paranoid and happy.\" Pt denies sadness/depression. Pt denies SI/SIB/HI.     When asked if Pt is experiencing AH or VH, Pt states \"staff says I have diabetes.\" Pt not seen to be responding to internal stimuli this shift.     Pt did not have any aggressive or physically intrusive behavior towards peers or staff this shift.   "
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
## 2019-03-20 LAB
GLUCOSE BLDC GLUCOMTR-MCNC: 113 MG/DL (ref 70–99)
GLUCOSE BLDC GLUCOMTR-MCNC: 169 MG/DL (ref 70–99)

## 2019-03-20 PROCEDURE — 25000132 ZZH RX MED GY IP 250 OP 250 PS 637: Performed by: STUDENT IN AN ORGANIZED HEALTH CARE EDUCATION/TRAINING PROGRAM

## 2019-03-20 PROCEDURE — 00000146 ZZHCL STATISTIC GLUCOSE BY METER IP

## 2019-03-20 PROCEDURE — 25000132 ZZH RX MED GY IP 250 OP 250 PS 637: Performed by: PSYCHIATRY & NEUROLOGY

## 2019-03-20 PROCEDURE — 25000132 ZZH RX MED GY IP 250 OP 250 PS 637: Performed by: EMERGENCY MEDICINE

## 2019-03-20 PROCEDURE — 12400001 ZZH R&B MH UMMC

## 2019-03-20 PROCEDURE — 25000132 ZZH RX MED GY IP 250 OP 250 PS 637: Performed by: PHYSICIAN ASSISTANT

## 2019-03-20 RX ADMIN — DIVALPROEX SODIUM 500 MG: 500 TABLET, DELAYED RELEASE ORAL at 08:12

## 2019-03-20 RX ADMIN — NIACIN 500 MG: 500 TABLET, EXTENDED RELEASE ORAL at 19:53

## 2019-03-20 RX ADMIN — PROPRANOLOL HYDROCHLORIDE 80 MG: 80 CAPSULE, EXTENDED RELEASE ORAL at 08:11

## 2019-03-20 RX ADMIN — FENOFIBRATE 160 MG: 160 TABLET, FILM COATED ORAL at 08:12

## 2019-03-20 RX ADMIN — SIMVASTATIN 40 MG: 40 TABLET, FILM COATED ORAL at 19:53

## 2019-03-20 RX ADMIN — ESCITALOPRAM 20 MG: 20 TABLET, FILM COATED ORAL at 08:12

## 2019-03-20 RX ADMIN — DIVALPROEX SODIUM 1000 MG: 500 TABLET, DELAYED RELEASE ORAL at 19:53

## 2019-03-20 RX ADMIN — CLOZAPINE 450 MG: 100 TABLET ORAL at 19:53

## 2019-03-20 RX ADMIN — PANTOPRAZOLE SODIUM 20 MG: 20 TABLET, DELAYED RELEASE ORAL at 08:11

## 2019-03-20 RX ADMIN — METFORMIN HYDROCHLORIDE 1000 MG: 500 TABLET ORAL at 18:11

## 2019-03-20 RX ADMIN — METFORMIN HYDROCHLORIDE 1000 MG: 500 TABLET ORAL at 08:11

## 2019-03-20 RX ADMIN — ARIPIPRAZOLE 10 MG: 10 TABLET ORAL at 08:11

## 2019-03-20 RX ADMIN — CLOZAPINE 200 MG: 100 TABLET ORAL at 08:11

## 2019-03-20 ASSESSMENT — ACTIVITIES OF DAILY LIVING (ADL)
LAUNDRY: UNABLE TO COMPLETE
DRESS: SCRUBS (BEHAVIORAL HEALTH)
HYGIENE/GROOMING: INDEPENDENT;PROMPTS
ORAL_HYGIENE: INDEPENDENT;PROMPTS
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
## 2019-03-21 LAB
GLUCOSE BLDC GLUCOMTR-MCNC: 129 MG/DL (ref 70–99)
GLUCOSE BLDC GLUCOMTR-MCNC: 131 MG/DL (ref 70–99)

## 2019-03-21 PROCEDURE — 00000146 ZZHCL STATISTIC GLUCOSE BY METER IP

## 2019-03-21 PROCEDURE — 25000132 ZZH RX MED GY IP 250 OP 250 PS 637: Performed by: EMERGENCY MEDICINE

## 2019-03-21 PROCEDURE — 25000132 ZZH RX MED GY IP 250 OP 250 PS 637: Performed by: PSYCHIATRY & NEUROLOGY

## 2019-03-21 PROCEDURE — 25000132 ZZH RX MED GY IP 250 OP 250 PS 637: Performed by: PHYSICIAN ASSISTANT

## 2019-03-21 PROCEDURE — 25000132 ZZH RX MED GY IP 250 OP 250 PS 637: Performed by: STUDENT IN AN ORGANIZED HEALTH CARE EDUCATION/TRAINING PROGRAM

## 2019-03-21 PROCEDURE — 12400001 ZZH R&B MH UMMC

## 2019-03-21 PROCEDURE — 99232 SBSQ HOSP IP/OBS MODERATE 35: CPT | Mod: GC | Performed by: PSYCHIATRY & NEUROLOGY

## 2019-03-21 RX ADMIN — NIACIN 500 MG: 500 TABLET, EXTENDED RELEASE ORAL at 19:14

## 2019-03-21 RX ADMIN — PANTOPRAZOLE SODIUM 20 MG: 20 TABLET, DELAYED RELEASE ORAL at 09:05

## 2019-03-21 RX ADMIN — SIMVASTATIN 40 MG: 40 TABLET, FILM COATED ORAL at 19:14

## 2019-03-21 RX ADMIN — METFORMIN HYDROCHLORIDE 1000 MG: 500 TABLET ORAL at 18:15

## 2019-03-21 RX ADMIN — CLOZAPINE 200 MG: 100 TABLET ORAL at 09:05

## 2019-03-21 RX ADMIN — PROPRANOLOL HYDROCHLORIDE 80 MG: 80 CAPSULE, EXTENDED RELEASE ORAL at 09:04

## 2019-03-21 RX ADMIN — FENOFIBRATE 160 MG: 160 TABLET, FILM COATED ORAL at 09:04

## 2019-03-21 RX ADMIN — DIVALPROEX SODIUM 500 MG: 500 TABLET, DELAYED RELEASE ORAL at 09:04

## 2019-03-21 RX ADMIN — CLOZAPINE 450 MG: 100 TABLET ORAL at 19:14

## 2019-03-21 RX ADMIN — ARIPIPRAZOLE 10 MG: 10 TABLET ORAL at 09:04

## 2019-03-21 RX ADMIN — DIVALPROEX SODIUM 1000 MG: 500 TABLET, DELAYED RELEASE ORAL at 19:14

## 2019-03-21 RX ADMIN — METFORMIN HYDROCHLORIDE 1000 MG: 500 TABLET ORAL at 09:04

## 2019-03-21 RX ADMIN — ESCITALOPRAM 20 MG: 20 TABLET, FILM COATED ORAL at 09:04

## 2019-03-21 ASSESSMENT — ACTIVITIES OF DAILY LIVING (ADL)
HYGIENE/GROOMING: INDEPENDENT;PROMPTS
HYGIENE/GROOMING: INDEPENDENT
DRESS: SCRUBS (BEHAVIORAL HEALTH);INDEPENDENT
ORAL_HYGIENE: INDEPENDENT
LAUNDRY: UNABLE TO COMPLETE
ORAL_HYGIENE: INDEPENDENT;PROMPTS
DRESS: SCRUBS (BEHAVIORAL HEALTH);INDEPENDENT
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
## 2019-03-21 NOTE — PROGRESS NOTES
"   03/20/19 2101   Behavioral Health   Hallucinations denies / not responding to hallucinations   Thinking distractable   Orientation person: oriented;place: oriented;date: oriented   Memory baseline memory   Insight poor   Judgement impaired   Eye Contact cultural specific   Affect blunted, flat   Mood depressed;mood is calm   Physical Appearance/Attire disheveled   Hygiene neglected grooming - unclean body, hair, teeth   1. Wish to be Dead No   2. Non-Specific Active Suicidal Thoughts  No   Activity isolative;withdrawn   Speech clear;coherent   Medication Sensitivity no stated side effects   Psychomotor / Gait slow;balanced;steady   Overt Aggression Scale   Verbal Aggression 0   Aggression against Property 0   Auto-Aggression 0   Physical Aggression 0   Overt Aggression Total Score 0   Psycho Education   Type of Intervention 1:1 intervention   Response participates with cues/redirection   Hours 0.5   Treatment Detail IMR   Pt ate meals but was not social with peers or staff. Pt states \"I feel half good and half depressed.\" Pt states he has no thought or desire to harm self or others at this time.  "

## 2019-03-22 LAB
GLUCOSE BLDC GLUCOMTR-MCNC: 109 MG/DL (ref 70–99)
GLUCOSE BLDC GLUCOMTR-MCNC: 133 MG/DL (ref 70–99)

## 2019-03-22 PROCEDURE — 25000132 ZZH RX MED GY IP 250 OP 250 PS 637: Performed by: EMERGENCY MEDICINE

## 2019-03-22 PROCEDURE — 12400001 ZZH R&B MH UMMC

## 2019-03-22 PROCEDURE — 99232 SBSQ HOSP IP/OBS MODERATE 35: CPT | Performed by: PSYCHIATRY & NEUROLOGY

## 2019-03-22 PROCEDURE — 25000132 ZZH RX MED GY IP 250 OP 250 PS 637: Performed by: PSYCHIATRY & NEUROLOGY

## 2019-03-22 PROCEDURE — 25000132 ZZH RX MED GY IP 250 OP 250 PS 637: Performed by: STUDENT IN AN ORGANIZED HEALTH CARE EDUCATION/TRAINING PROGRAM

## 2019-03-22 PROCEDURE — 25000132 ZZH RX MED GY IP 250 OP 250 PS 637: Performed by: PHYSICIAN ASSISTANT

## 2019-03-22 PROCEDURE — 00000146 ZZHCL STATISTIC GLUCOSE BY METER IP

## 2019-03-22 RX ADMIN — FENOFIBRATE 160 MG: 160 TABLET, FILM COATED ORAL at 07:53

## 2019-03-22 RX ADMIN — ESCITALOPRAM 20 MG: 20 TABLET, FILM COATED ORAL at 07:53

## 2019-03-22 RX ADMIN — SIMVASTATIN 40 MG: 40 TABLET, FILM COATED ORAL at 19:08

## 2019-03-22 RX ADMIN — METFORMIN HYDROCHLORIDE 1000 MG: 500 TABLET ORAL at 18:01

## 2019-03-22 RX ADMIN — CLOZAPINE 200 MG: 100 TABLET ORAL at 07:54

## 2019-03-22 RX ADMIN — NIACIN 500 MG: 500 TABLET, EXTENDED RELEASE ORAL at 19:08

## 2019-03-22 RX ADMIN — DIVALPROEX SODIUM 1000 MG: 500 TABLET, DELAYED RELEASE ORAL at 19:08

## 2019-03-22 RX ADMIN — PANTOPRAZOLE SODIUM 20 MG: 20 TABLET, DELAYED RELEASE ORAL at 07:53

## 2019-03-22 RX ADMIN — DIVALPROEX SODIUM 500 MG: 500 TABLET, DELAYED RELEASE ORAL at 07:53

## 2019-03-22 RX ADMIN — METFORMIN HYDROCHLORIDE 1000 MG: 500 TABLET ORAL at 07:53

## 2019-03-22 RX ADMIN — PROPRANOLOL HYDROCHLORIDE 80 MG: 80 CAPSULE, EXTENDED RELEASE ORAL at 07:53

## 2019-03-22 RX ADMIN — ARIPIPRAZOLE 10 MG: 10 TABLET ORAL at 07:57

## 2019-03-22 RX ADMIN — CLOZAPINE 450 MG: 100 TABLET ORAL at 19:08

## 2019-03-22 ASSESSMENT — ACTIVITIES OF DAILY LIVING (ADL)
HYGIENE/GROOMING: PROMPTS
HYGIENE/GROOMING: INDEPENDENT
ORAL_HYGIENE: INDEPENDENT
LAUNDRY: UNABLE TO COMPLETE
DRESS: INDEPENDENT
LAUNDRY: UNABLE TO COMPLETE
DRESS: PROMPTS
ORAL_HYGIENE: PROMPTS
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
## 2019-03-22 NOTE — PLAN OF CARE
"48 hour nursing assessment    SI/SIB/HI: pt stated \"I thought about punching myself in the head until I die.. I won't do it though\". Pt denies wanting to die, denies any intent to harm self or anyone else   Hallucinations: states he hears his \"spiritual ally\", as well as \"[place] playing guitar really loud\".    Depression: pt denies  Anxiety: pt denies  Other symptoms reported: none    Shift summary: Pt expresses interest in spending more time in the milieu. Provider ordered SIO to include staff distance of 5 feet when pt is in milieu (staff may sit outside door when pt is in his room). Pt remains on 10 foot restriction from peers due to safety risk. Pt is pleasant and cooperative with cares this shift. VSS, .   No other concerns at this time. Nursing will continue to monitor and assess.     "

## 2019-03-22 NOTE — PROGRESS NOTES
Pt was calm and pleasant this evening.  He spent most of this evening in his room resting in bed. Pt remains delusional with no insight into his mental status. Pt is on status individual observation male staff only due to his aggressive behaviors towards staff.  Pt compliant with scheduled medication and denies SE s. Blood glucose this evening was 131. Pt s vital sign stable.  Pt took a shower this evening. No SI/SIB

## 2019-03-23 LAB
GLUCOSE BLDC GLUCOMTR-MCNC: 101 MG/DL (ref 70–99)
GLUCOSE BLDC GLUCOMTR-MCNC: 115 MG/DL (ref 70–99)

## 2019-03-23 PROCEDURE — 12400001 ZZH R&B MH UMMC

## 2019-03-23 PROCEDURE — 25000132 ZZH RX MED GY IP 250 OP 250 PS 637: Performed by: EMERGENCY MEDICINE

## 2019-03-23 PROCEDURE — 25000132 ZZH RX MED GY IP 250 OP 250 PS 637: Performed by: STUDENT IN AN ORGANIZED HEALTH CARE EDUCATION/TRAINING PROGRAM

## 2019-03-23 PROCEDURE — 25000132 ZZH RX MED GY IP 250 OP 250 PS 637: Performed by: PHYSICIAN ASSISTANT

## 2019-03-23 PROCEDURE — 00000146 ZZHCL STATISTIC GLUCOSE BY METER IP

## 2019-03-23 PROCEDURE — 25000132 ZZH RX MED GY IP 250 OP 250 PS 637: Performed by: PSYCHIATRY & NEUROLOGY

## 2019-03-23 RX ADMIN — METFORMIN HYDROCHLORIDE 1000 MG: 500 TABLET ORAL at 08:41

## 2019-03-23 RX ADMIN — CLOZAPINE 450 MG: 100 TABLET ORAL at 19:12

## 2019-03-23 RX ADMIN — DIVALPROEX SODIUM 1000 MG: 500 TABLET, DELAYED RELEASE ORAL at 19:12

## 2019-03-23 RX ADMIN — ESCITALOPRAM 20 MG: 20 TABLET, FILM COATED ORAL at 08:40

## 2019-03-23 RX ADMIN — FENOFIBRATE 160 MG: 160 TABLET, FILM COATED ORAL at 08:40

## 2019-03-23 RX ADMIN — METFORMIN HYDROCHLORIDE 1000 MG: 500 TABLET ORAL at 17:58

## 2019-03-23 RX ADMIN — SIMVASTATIN 40 MG: 40 TABLET, FILM COATED ORAL at 19:13

## 2019-03-23 RX ADMIN — PROPRANOLOL HYDROCHLORIDE 80 MG: 80 CAPSULE, EXTENDED RELEASE ORAL at 08:40

## 2019-03-23 RX ADMIN — NIACIN 500 MG: 500 TABLET, EXTENDED RELEASE ORAL at 19:12

## 2019-03-23 RX ADMIN — PANTOPRAZOLE SODIUM 20 MG: 20 TABLET, DELAYED RELEASE ORAL at 08:41

## 2019-03-23 RX ADMIN — DIVALPROEX SODIUM 500 MG: 500 TABLET, DELAYED RELEASE ORAL at 08:40

## 2019-03-23 RX ADMIN — CLOZAPINE 200 MG: 100 TABLET ORAL at 08:40

## 2019-03-23 RX ADMIN — ARIPIPRAZOLE 10 MG: 10 TABLET ORAL at 08:41

## 2019-03-23 ASSESSMENT — ACTIVITIES OF DAILY LIVING (ADL)
LAUNDRY: UNABLE TO COMPLETE
ORAL_HYGIENE: INDEPENDENT
DRESS: SCRUBS (BEHAVIORAL HEALTH)
HYGIENE/GROOMING: INDEPENDENT
ORAL_HYGIENE: PROMPTS
LAUNDRY: UNABLE TO COMPLETE
HYGIENE/GROOMING: PROMPTS
DRESS: SCRUBS (BEHAVIORAL HEALTH);INDEPENDENT
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
## 2019-03-23 NOTE — PROGRESS NOTES
"Pt talked about \"feeling good.\"  He appeared to be in a good mood.  Pt denies anx, dep, /si, /sib.  Pt could not ID any concerns.  "

## 2019-03-24 LAB — GLUCOSE BLDC GLUCOMTR-MCNC: 102 MG/DL (ref 70–99)

## 2019-03-24 PROCEDURE — 12400001 ZZH R&B MH UMMC

## 2019-03-24 PROCEDURE — 00000146 ZZHCL STATISTIC GLUCOSE BY METER IP

## 2019-03-24 PROCEDURE — 25000132 ZZH RX MED GY IP 250 OP 250 PS 637: Performed by: EMERGENCY MEDICINE

## 2019-03-24 PROCEDURE — 25000132 ZZH RX MED GY IP 250 OP 250 PS 637: Performed by: PSYCHIATRY & NEUROLOGY

## 2019-03-24 PROCEDURE — 25000132 ZZH RX MED GY IP 250 OP 250 PS 637: Performed by: PHYSICIAN ASSISTANT

## 2019-03-24 PROCEDURE — 25000132 ZZH RX MED GY IP 250 OP 250 PS 637: Performed by: STUDENT IN AN ORGANIZED HEALTH CARE EDUCATION/TRAINING PROGRAM

## 2019-03-24 RX ADMIN — PANTOPRAZOLE SODIUM 20 MG: 20 TABLET, DELAYED RELEASE ORAL at 08:02

## 2019-03-24 RX ADMIN — CLOZAPINE 450 MG: 100 TABLET ORAL at 19:13

## 2019-03-24 RX ADMIN — METFORMIN HYDROCHLORIDE 1000 MG: 500 TABLET ORAL at 08:02

## 2019-03-24 RX ADMIN — NIACIN 500 MG: 500 TABLET, EXTENDED RELEASE ORAL at 19:13

## 2019-03-24 RX ADMIN — SIMVASTATIN 40 MG: 40 TABLET, FILM COATED ORAL at 19:13

## 2019-03-24 RX ADMIN — CLOZAPINE 200 MG: 100 TABLET ORAL at 08:02

## 2019-03-24 RX ADMIN — METFORMIN HYDROCHLORIDE 1000 MG: 500 TABLET ORAL at 18:04

## 2019-03-24 RX ADMIN — FENOFIBRATE 160 MG: 160 TABLET, FILM COATED ORAL at 08:02

## 2019-03-24 RX ADMIN — DIVALPROEX SODIUM 1000 MG: 500 TABLET, DELAYED RELEASE ORAL at 19:13

## 2019-03-24 RX ADMIN — ESCITALOPRAM 20 MG: 20 TABLET, FILM COATED ORAL at 08:02

## 2019-03-24 RX ADMIN — ARIPIPRAZOLE 10 MG: 10 TABLET ORAL at 08:02

## 2019-03-24 RX ADMIN — DIVALPROEX SODIUM 500 MG: 500 TABLET, DELAYED RELEASE ORAL at 08:02

## 2019-03-24 RX ADMIN — PROPRANOLOL HYDROCHLORIDE 80 MG: 80 CAPSULE, EXTENDED RELEASE ORAL at 08:02

## 2019-03-24 ASSESSMENT — ACTIVITIES OF DAILY LIVING (ADL)
LAUNDRY: UNABLE TO COMPLETE
ORAL_HYGIENE: PROMPTS
DRESS: SCRUBS (BEHAVIORAL HEALTH)
HYGIENE/GROOMING: PROMPTS
LAUNDRY: UNABLE TO COMPLETE
ORAL_HYGIENE: PROMPTS
DRESS: SCRUBS (BEHAVIORAL HEALTH)
HYGIENE/GROOMING: PROMPTS

## 2019-03-24 NOTE — PLAN OF CARE
Behavioral Disturbance  3/24/2019 1314 - Improving by Carlos Melchor RN     Patient continues on SIO 1:1 male only with a 5 ft space restriction.  Staff asked him multiple times if the patient would like to enter the milieu, but he declined.  Patient slept much of the shift.  He denied feelings of sedation.  He had a flat affect.  He stated  I removed my organs from my body last night  and  I am hearing the voices of angels  and  I am worried my soul will be stolen .  He denied visual hallucinations, which is an improvement (he had been reporting  Visions ).  He denied SI and SIB.  He denied feeling depressed, or anxious, starting  I feel good .   No agitation, or aggression.    Patient was compliant with AM medications.  Denied pain and acute medical concerns.  Patient showered with staff prompts.

## 2019-03-24 NOTE — PROGRESS NOTES
Patient had a much better evening shift with no outbursts and was able to regulate behavior when having his blood pressure done. Patient was also prompted to take a shower and have his bed changed. He did go to take a shower and did so with no issues walking to and from the shower.

## 2019-03-25 LAB
GLUCOSE BLDC GLUCOMTR-MCNC: 128 MG/DL (ref 70–99)
GLUCOSE BLDC GLUCOMTR-MCNC: 150 MG/DL (ref 70–99)
VALPROATE SERPL-MCNC: 64 MG/L (ref 50–100)

## 2019-03-25 PROCEDURE — 99232 SBSQ HOSP IP/OBS MODERATE 35: CPT | Mod: GC | Performed by: PSYCHIATRY & NEUROLOGY

## 2019-03-25 PROCEDURE — 00000146 ZZHCL STATISTIC GLUCOSE BY METER IP

## 2019-03-25 PROCEDURE — 12400001 ZZH R&B MH UMMC

## 2019-03-25 PROCEDURE — 25000132 ZZH RX MED GY IP 250 OP 250 PS 637: Performed by: STUDENT IN AN ORGANIZED HEALTH CARE EDUCATION/TRAINING PROGRAM

## 2019-03-25 PROCEDURE — 25000132 ZZH RX MED GY IP 250 OP 250 PS 637: Performed by: PSYCHIATRY & NEUROLOGY

## 2019-03-25 PROCEDURE — 25000132 ZZH RX MED GY IP 250 OP 250 PS 637: Performed by: EMERGENCY MEDICINE

## 2019-03-25 PROCEDURE — 80164 ASSAY DIPROPYLACETIC ACD TOT: CPT | Performed by: STUDENT IN AN ORGANIZED HEALTH CARE EDUCATION/TRAINING PROGRAM

## 2019-03-25 PROCEDURE — 36415 COLL VENOUS BLD VENIPUNCTURE: CPT | Performed by: STUDENT IN AN ORGANIZED HEALTH CARE EDUCATION/TRAINING PROGRAM

## 2019-03-25 PROCEDURE — 25000132 ZZH RX MED GY IP 250 OP 250 PS 637: Performed by: PHYSICIAN ASSISTANT

## 2019-03-25 RX ADMIN — SIMVASTATIN 40 MG: 40 TABLET, FILM COATED ORAL at 19:18

## 2019-03-25 RX ADMIN — NIACIN 500 MG: 500 TABLET, EXTENDED RELEASE ORAL at 19:18

## 2019-03-25 RX ADMIN — CLOZAPINE 200 MG: 100 TABLET ORAL at 08:35

## 2019-03-25 RX ADMIN — DIVALPROEX SODIUM 1000 MG: 500 TABLET, DELAYED RELEASE ORAL at 19:18

## 2019-03-25 RX ADMIN — METFORMIN HYDROCHLORIDE 1000 MG: 500 TABLET ORAL at 18:04

## 2019-03-25 RX ADMIN — PROPRANOLOL HYDROCHLORIDE 80 MG: 80 CAPSULE, EXTENDED RELEASE ORAL at 08:35

## 2019-03-25 RX ADMIN — PANTOPRAZOLE SODIUM 20 MG: 20 TABLET, DELAYED RELEASE ORAL at 08:35

## 2019-03-25 RX ADMIN — FENOFIBRATE 160 MG: 160 TABLET, FILM COATED ORAL at 08:35

## 2019-03-25 RX ADMIN — METFORMIN HYDROCHLORIDE 1000 MG: 500 TABLET ORAL at 08:35

## 2019-03-25 RX ADMIN — ARIPIPRAZOLE 10 MG: 10 TABLET ORAL at 08:35

## 2019-03-25 RX ADMIN — DIVALPROEX SODIUM 500 MG: 500 TABLET, DELAYED RELEASE ORAL at 08:35

## 2019-03-25 RX ADMIN — ESCITALOPRAM 20 MG: 20 TABLET, FILM COATED ORAL at 08:35

## 2019-03-25 RX ADMIN — CLOZAPINE 450 MG: 100 TABLET ORAL at 19:18

## 2019-03-25 ASSESSMENT — ACTIVITIES OF DAILY LIVING (ADL)
LAUNDRY: UNABLE TO COMPLETE
ORAL_HYGIENE: PROMPTS
DRESS: SCRUBS (BEHAVIORAL HEALTH)
LAUNDRY: UNABLE TO COMPLETE
HYGIENE/GROOMING: PROMPTS
HYGIENE/GROOMING: PROMPTS
DRESS: SCRUBS (BEHAVIORAL HEALTH)
ORAL_HYGIENE: PROMPTS

## 2019-03-25 NOTE — PLAN OF CARE
"BEHAVIORAL TEAM DISCUSSION    Participants: dr mercado, resident dr schulz, dipika sarmiento rn, bella rae rn, christi coleman [facility]    Progress: Pt seems to be improving.   He had one minor instance of lunging out at staff when vitals were being taken a couple days so.   But overall, these outbursts have diminished in frequency and intensity.   He does remain delusional with auditory hallucinations,  e.g., \"I removed my organs from my body,\" and \"I'm hearing voices of angels.\"   Pt is med compliant.  He follows through with ADL's after staff prompt him.   Pt spends a lot of time in his room even when staff invite him into the lounge areas.    Continued Stay Criteria/Rationale: due to symptoms.  Medical/Physical:  per internal medicine  Precautions:   Behavioral Orders   Procedures    Assault precautions    Code 1 - Restrict to Unit    Routine Programming     As clinically indicated    Sexual precautions    Status 15     Every 15 minutes.    Status Individual Observation     Pt is on 5ft space restriction due to aggressive behaviors towards nursing staff and male staff only due to hyper-sexual behaviors towards female staff. When patient is in his room, staff may remain outside of his room.     Order Specific Question:   CONTINUOUS 24 hours / day     Answer:   5 feet     Order Specific Question:   Indications for SIO     Answer:   Assault risk    Suicide precautions     Patients on Suicide Precautions should have a Combination Diet ordered that includes a Diet selection(s) AND a Behavioral Tray selection for Safe Tray - with utensils, or Safe Tray - NO utensils       Plan: meds per dr paniagua and resident dr schulz.   The plan is for pt to return to his group home.   Their staff come to visit pt each week, to stay connected.     Rationale for change in precautions or plan: no change at this time      "

## 2019-03-25 NOTE — PROGRESS NOTES
"Patient was calm this shift, quietly watching TV in his room.  He requested to come out to \"sit in the living room\" by which he means the lounge.  His request will be honored during reflection time. He ate meals.  No incidences.  "

## 2019-03-25 NOTE — PROGRESS NOTES
Staff attempted to engage pt in activities by asking pt if he wanted to participate in group, walk around in the milieu, ride the stationary bike, play cards, etc. Pt declined, saying he just wanted to stay in his room and watch tv.

## 2019-03-25 NOTE — PROGRESS NOTES
Patient remained in his room entire shift, even to eat meals.  He watched TV and made no attempt to leave or even approach the door. He took meds and vitals without incident. At one point I saw him get up and  front of the window overlooking the panoramic view of the [facility] afforded by his room.
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
## 2019-03-26 LAB
GLUCOSE BLDC GLUCOMTR-MCNC: 123 MG/DL (ref 70–99)
GLUCOSE BLDC GLUCOMTR-MCNC: 147 MG/DL (ref 70–99)
VALPROATE SERPL-MCNC: 89 MG/L (ref 50–100)

## 2019-03-26 PROCEDURE — 25000132 ZZH RX MED GY IP 250 OP 250 PS 637: Performed by: EMERGENCY MEDICINE

## 2019-03-26 PROCEDURE — 80164 ASSAY DIPROPYLACETIC ACD TOT: CPT | Performed by: STUDENT IN AN ORGANIZED HEALTH CARE EDUCATION/TRAINING PROGRAM

## 2019-03-26 PROCEDURE — 36415 COLL VENOUS BLD VENIPUNCTURE: CPT | Performed by: STUDENT IN AN ORGANIZED HEALTH CARE EDUCATION/TRAINING PROGRAM

## 2019-03-26 PROCEDURE — 25000132 ZZH RX MED GY IP 250 OP 250 PS 637: Performed by: PSYCHIATRY & NEUROLOGY

## 2019-03-26 PROCEDURE — 12400001 ZZH R&B MH UMMC

## 2019-03-26 PROCEDURE — 00000146 ZZHCL STATISTIC GLUCOSE BY METER IP

## 2019-03-26 PROCEDURE — 25000132 ZZH RX MED GY IP 250 OP 250 PS 637: Performed by: PHYSICIAN ASSISTANT

## 2019-03-26 PROCEDURE — 25000132 ZZH RX MED GY IP 250 OP 250 PS 637: Performed by: STUDENT IN AN ORGANIZED HEALTH CARE EDUCATION/TRAINING PROGRAM

## 2019-03-26 RX ADMIN — NIACIN 500 MG: 500 TABLET, EXTENDED RELEASE ORAL at 19:17

## 2019-03-26 RX ADMIN — ESCITALOPRAM 20 MG: 20 TABLET, FILM COATED ORAL at 08:40

## 2019-03-26 RX ADMIN — SIMVASTATIN 40 MG: 40 TABLET, FILM COATED ORAL at 19:17

## 2019-03-26 RX ADMIN — DIVALPROEX SODIUM 1000 MG: 500 TABLET, DELAYED RELEASE ORAL at 19:18

## 2019-03-26 RX ADMIN — FENOFIBRATE 160 MG: 160 TABLET, FILM COATED ORAL at 08:40

## 2019-03-26 RX ADMIN — CLOZAPINE 200 MG: 100 TABLET ORAL at 08:40

## 2019-03-26 RX ADMIN — CLOZAPINE 450 MG: 100 TABLET ORAL at 19:17

## 2019-03-26 RX ADMIN — PROPRANOLOL HYDROCHLORIDE 80 MG: 80 CAPSULE, EXTENDED RELEASE ORAL at 08:40

## 2019-03-26 RX ADMIN — DIVALPROEX SODIUM 500 MG: 500 TABLET, DELAYED RELEASE ORAL at 08:40

## 2019-03-26 RX ADMIN — METFORMIN HYDROCHLORIDE 1000 MG: 500 TABLET ORAL at 18:18

## 2019-03-26 RX ADMIN — ARIPIPRAZOLE 10 MG: 10 TABLET ORAL at 08:40

## 2019-03-26 RX ADMIN — PANTOPRAZOLE SODIUM 20 MG: 20 TABLET, DELAYED RELEASE ORAL at 08:40

## 2019-03-26 RX ADMIN — METFORMIN HYDROCHLORIDE 1000 MG: 500 TABLET ORAL at 08:40

## 2019-03-26 NOTE — PROVIDER NOTIFICATION
03/26/19 1450   Behavioral Health   Suicidality (WDL) WDL   Suicidality other (see comments)  (none)   1. Wish to be Dead No   Wish to be Dead Description denies   2. Non-Specific Active Suicidal Thoughts  No   Non-Specific Active Suicidal Thought Description denies   3. Active Sucidal Ideation with any Methods (Not Plan) Without Intent to Act  No   Active Suicidal Ideation with any Methods (Not Plan) Description  denies   4. Active Suicidal Ideation with Some Intent to Act, Without Specific Plan  No   Active Suicidal Ideation with Some Intent to Act, Without Specific Plan Description  denies   5. Active Suicidal Ideation with Specific Plan and Intent  No   Active Suicidal Ideation with Specific Plan and Intent Description  denies   Duration (Lifetime) NA   Change in Protective Factors? No   Enviromental Risk Factors Other (see comments)  (none)   Self Injury other (see comment)  (denies)     Pt SIO discontinued at 1345. First SI/SIB recheck negative. Pt denies feelings of harming self or others.

## 2019-03-26 NOTE — PROGRESS NOTES
Pt was calm and cooperative. Limited conversation skills, usually loses focus or confabulates.  Ate all of his food. No aggressive behaviors this shift.

## 2019-03-26 NOTE — PROVIDER NOTIFICATION
03/26/19 1801   Behavioral Health   Suicidality (WDL) WDL   Suicidality other (see comments)  (PT denies,none observed)   1. Wish to be Dead No   Wish to be Dead Description denies   2. Non-Specific Active Suicidal Thoughts  No   Non-Specific Active Suicidal Thought Description denies   3. Active Sucidal Ideation with any Methods (Not Plan) Without Intent to Act  No   Active Suicidal Ideation with any Methods (Not Plan) Description  denies   4. Active Suicidal Ideation with Some Intent to Act, Without Specific Plan  No   Active Suicidal Ideation with Some Intent to Act, Without Specific Plan Description  denies   5. Active Suicidal Ideation with Specific Plan and Intent  No   Active Suicidal Ideation with Specific Plan and Intent Description  denies   Duration (Lifetime) NA   Change in Protective Factors? No   Enviromental Risk Factors None   Self Injury other (see comment)  (PT denies,none observed)     Post discontinuation SIO assessment completed. Patient denies any thoughts of suicidal or self injurious behaviors with no observations of active suicidal behavior Patient appeared calm and resting in room watching television.

## 2019-03-26 NOTE — PLAN OF CARE
Behavioral Disturbance  3/25/2019 2108 - Improving by Carlos Melchor, RN    Patient continues on male only SIO with a 5ft space restriction.  He was calm and cooperative with staff.  He denied AH and VH.  He stated he likes  changing my organs .  He had no agitation, or aggression.  He had less delusional statements then previous days.  He followed staff redirection of returning to his room when a behavioral code occurred on a peer.      He refused to shower and appears disheveled.     Patient was compliant with his HS medications.  His abdomen appears distended, but he denied pain, or constipation.  He denied acute medical concerns and medications side effects.  He was cooperative with his BG check.
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
## 2019-03-27 LAB
GLUCOSE BLDC GLUCOMTR-MCNC: 118 MG/DL (ref 70–99)
GLUCOSE BLDC GLUCOMTR-MCNC: 89 MG/DL (ref 70–99)

## 2019-03-27 PROCEDURE — 25000132 ZZH RX MED GY IP 250 OP 250 PS 637: Performed by: PHYSICIAN ASSISTANT

## 2019-03-27 PROCEDURE — 25000132 ZZH RX MED GY IP 250 OP 250 PS 637: Performed by: PSYCHIATRY & NEUROLOGY

## 2019-03-27 PROCEDURE — 25000132 ZZH RX MED GY IP 250 OP 250 PS 637: Performed by: STUDENT IN AN ORGANIZED HEALTH CARE EDUCATION/TRAINING PROGRAM

## 2019-03-27 PROCEDURE — 99232 SBSQ HOSP IP/OBS MODERATE 35: CPT | Mod: GC | Performed by: PSYCHIATRY & NEUROLOGY

## 2019-03-27 PROCEDURE — 25000132 ZZH RX MED GY IP 250 OP 250 PS 637: Performed by: EMERGENCY MEDICINE

## 2019-03-27 PROCEDURE — 00000146 ZZHCL STATISTIC GLUCOSE BY METER IP

## 2019-03-27 PROCEDURE — 12400001 ZZH R&B MH UMMC

## 2019-03-27 RX ADMIN — ESCITALOPRAM 20 MG: 20 TABLET, FILM COATED ORAL at 10:02

## 2019-03-27 RX ADMIN — CLOZAPINE 200 MG: 100 TABLET ORAL at 10:02

## 2019-03-27 RX ADMIN — DIVALPROEX SODIUM 1000 MG: 500 TABLET, DELAYED RELEASE ORAL at 19:10

## 2019-03-27 RX ADMIN — PANTOPRAZOLE SODIUM 20 MG: 20 TABLET, DELAYED RELEASE ORAL at 10:01

## 2019-03-27 RX ADMIN — DIVALPROEX SODIUM 500 MG: 500 TABLET, DELAYED RELEASE ORAL at 10:01

## 2019-03-27 RX ADMIN — METFORMIN HYDROCHLORIDE 1000 MG: 500 TABLET ORAL at 10:01

## 2019-03-27 RX ADMIN — FENOFIBRATE 160 MG: 160 TABLET, FILM COATED ORAL at 10:01

## 2019-03-27 RX ADMIN — SIMVASTATIN 40 MG: 40 TABLET, FILM COATED ORAL at 19:10

## 2019-03-27 RX ADMIN — ARIPIPRAZOLE 10 MG: 10 TABLET ORAL at 10:02

## 2019-03-27 RX ADMIN — PROPRANOLOL HYDROCHLORIDE 80 MG: 80 CAPSULE, EXTENDED RELEASE ORAL at 10:01

## 2019-03-27 RX ADMIN — NIACIN 500 MG: 500 TABLET, EXTENDED RELEASE ORAL at 19:09

## 2019-03-27 RX ADMIN — CLOZAPINE 450 MG: 100 TABLET ORAL at 19:09

## 2019-03-27 RX ADMIN — METFORMIN HYDROCHLORIDE 1000 MG: 500 TABLET ORAL at 18:00

## 2019-03-27 ASSESSMENT — ACTIVITIES OF DAILY LIVING (ADL)
ORAL_HYGIENE: INDEPENDENT;PROMPTS
HYGIENE/GROOMING: INDEPENDENT;PROMPTS
HYGIENE/GROOMING: PROMPTS
DRESS: INDEPENDENT
LAUNDRY: UNABLE TO COMPLETE
DRESS: SCRUBS (BEHAVIORAL HEALTH)
LAUNDRY: UNABLE TO COMPLETE
ORAL_HYGIENE: PROMPTS

## 2019-03-27 NOTE — PROGRESS NOTES
"[facility], [place], [place]   Psychiatric Progress Note  Hospital Day: 45        Interim History:   The patient's care was discussed with the treatment team during the daily team meeting and/or staff's chart notes were reviewed. Staff report patient did not have any outbursts overnight, or require any PRNs. His SIO was discontinued and he did well. He slept for 6.5 hours. He did not attend groups yesterday or this morning.    Upon interview, the patient reported that he is feeling \"real good.\" He was sitting in the lounge, and when asked about groups, stating that he had attended 2 groups and he \"mariela his dream car\" as well as pictures of \" people.\" (OT reported that he did not attend groups today, though he did attend and draw his dream car over one month ago.) When asked about his dream car, he stated, \"drum and disc brakes,\" said he wants a \"dark blue Camaro\" that he would drive \"120,\" and a \"[place]azarati that goes 150.\" He denied feeling angry yesterday, though he did say, \"I punched myself in the face. I made sure to do it gently.\" He has not been witnessed punching himself. He spoke about not wanting to eat or shower \"just to be picky\" but was willing to do these things when prompted after a discussion about how he can demonstrate his readiness to discharge back to the group home. He does describe people \"giving him dirty looks,\" but denies speaking to the devil. He denies any physical symptoms.          Medications:       ARIPiprazole  10 mg Oral Daily     cloZAPine  200 mg Oral Daily     cloZAPine  450 mg Oral At Bedtime     divalproex sodium delayed-release  1,000 mg Oral At Bedtime     divalproex sodium delayed-release  500 mg Oral Daily     escitalopram  20 mg Oral Daily     fenofibrate  160 mg Oral Daily     medroxyPROGESTERone  150 mg Intramuscular Q14 Days     metFORMIN  1,000 mg Oral BID w/meals     niacin ER  500 mg Oral At Bedtime     pantoprazole  20 mg Oral Daily     " propranolol ER  80 mg Oral Daily     simvastatin  40 mg Oral At Bedtime          Allergies:   No Known Allergies       Labs:     Recent Results (from the past 24 hour(s))   Glucose by meter    Collection Time: 03/26/19  5:22 PM   Result Value Ref Range    Glucose 147 (H) 70 - 99 mg/dL   Glucose by meter    Collection Time: 03/27/19  8:34 AM   Result Value Ref Range    Glucose 118 (H) 70 - 99 mg/dL          Psychiatric Examination:     /68   Pulse 113   Temp 98.8  F (37.1  C) (Tympanic)   Resp 16   Wt 92.5 kg (204 lb)   SpO2 98%   Weight is 204 lbs 0 oz  There is no height or weight on file to calculate BMI.    Weight over time:  Vitals:    02/09/19 0727 02/26/19 0800 03/20/19 0800   Weight: 95 kg (209 lb 8 oz) 93.4 kg (206 lb) 92.5 kg (204 lb)       Orthostatic Vitals       Most Recent      Sitting Orthostatic /71 03/04 1930    Sitting Orthostatic Pulse (bpm) 91 03/04 1930    Standing Orthostatic /53 03/04 1930    Standing Orthostatic Pulse (bpm) 99 03/04 1930        Cardiometabolic risk assessment. 03/05/19      Reviewed patient profile for cardiometabolic risk factors    Date taken /Value  REFERENCE RANGE   Abdominal Obesity  (Waist Circumference)   See nursing flowsheet Women ?35 in (88 cm)   Men ?40 in (102 cm)      Triglycerides  No results found for: TRIG    ?150 mg/dL (1.7 mmol/L) or current treatment for elevated triglycerides   HDL cholesterol  No results found for: HDL]   Women <50 mg/dL (1.3 mmol/L) in women or current treatment for low HDL cholesterol  Men <40 mg/dL (1 mmol/L) in men or current treatment for low HDL cholesterol     Fasting plasma glucose (FPG) Lab Results   Component Value Date     02/28/2019      FPG ?100 mg/dL (5.6 mmol/L) or treatment for elevated blood glucose   Blood pressure  BP Readings from Last 3 Encounters:   03/27/19 130/68    Blood pressure ?130/85 mmHg or treatment for elevated blood pressure   Family History  See family history   Appearance:  "awake, alert, unkempt and disheveled . Sitting comfortably on a chair in the lounge, hands in his lap.  Attitude:  cooperative  Eye Contact:  good  Mood: \"real good\"  Affect: mood congruent, anxious and constricted mobility  Speech:  clear, coherent, slowed. Frequent pauses, says \"mm hmm\" frequently in the middle of sentences.  Language: fluent and intact in English  Psychomotor, Gait, Musculoskeletal: Intermittent, apparently involuntary movements of the hands and feet (foot tapping, flexion/extension of the fingers), do not appear bothersome to the patient.  Throught Process: disorganized, illogical and tangential, but more linear today than yesterday. Occasional interjections about unrelated subjects, particularly lists of car parts. Mandaeism content seems decreased today.  Associations: Loosening of associations present  Thought Content: Denies suicidal ideation and homicidal ideation  Insight:  limited  Judgment:  limited  Oriented to: person only  Attention Span and Concentration:  fair  Recent and Remote Memory:  fair  Fund of Knowledge:  delayed    Clinical Global Impressions  First:  Considering your total clinical experience with this particular patient population, how severe are the patient's symptoms at this time?: 7 (02/11/19 1643)  Compared to the patient's condition at the START of treatment, this patient's condition is:: 4 (02/11/19 1643)  Most recent:  Considering your total clinical experience with this particular patient population, how severe are the patient's symptoms at this time?: 5 (02/27/19 0710)  Compared to the patient's condition at the START of treatment, this patient's condition is:: 2 (02/27/19 0710)           Precautions:     Behavioral Orders   Procedures     Assault precautions     Code 1 - Restrict to Unit     Routine Programming     As clinically indicated     Sexual precautions     Status 15     Every 15 minutes.     Suicide precautions     Patients on Suicide Precautions should " have a Combination Diet ordered that includes a Diet selection(s) AND a Behavioral Tray selection for Safe Tray - with utensils, or Safe Tray - NO utensils            Diagnoses:   Schizophrenia, decompensated  Mild intellectual disability  History of tardive dyskinesia  History of traumatic brain injury  History of major depressive disorder and anxiety          Assessment & Plan:     Assessment and hospital summary:  Brayden Adrian with a past medical history of tardive dyskinesia, intellectual disability, schizoaffective disorder bipolar type, hypertension, apnea, GERD, diabetes type 2, depression, anxiety was admitted 2/8/2019 from group home for increased sexual inappropriateness, aggressive behaviors, and worsening function. Aggression appears  Improved since addition of Depakote.    Target psychiatric symptoms and interventions:  Abilify increased to 10 mg daily on 3/5.  Increased to 15 mg daily on 3/8. Discussed R/B/A with guardian, his father Jose. Worsening in behavior observed since recent increase with more prominent EPSE, so dose was reduced back to 10 mg daily.  -Abilify 10 mg PO daily  -Depakote started 3/12, current dose 500 mg PO qAM and 1000 mg PO qHS  -Level on 1500 mg/day on 3/26 was 89. Will maintain this dose.  Guardian strongly prefers that we avoid Haldol given history of involuntary movements (observed by guardian most recently on 2/27)    Medical Problems and Treatments:  Reported Diarrhea and Headache: Pt is very poor historian and inconsistently reports physical health concerns. Will continue to monitor closely.   Ongoing intermittent orthostatic hypotension and tachycardia  Per IM note dated 3/2: Please notify Internal Medicine if patient is persistently tachycardic >120bpm or if patient has hypotension of SBP <90 or DBP <60 or if he is symptomatic with dizziness, lightheadedness, SOB or chest pain.   -Will reconsult medicine if this persists.  Patient is poor historian and  inconsistent in his reports with regards to physical health symptoms.    Urinary frequency/dysuria noted 3/14: Afebrile, no systemic symptoms or signs  -UA normal, low concern for UTI    Behavioral/Psychological/Social:  -Recommend staff stay out of arms length and perhaps recommend that he is sitting down so he cannot grab at the interviewer.   -Working toward expanding privileges if he can maintain nonviolent behavior. SIO discontinued on 3/26/19.  -Recommend frequent reminders and reassurance about goals for discharge, including ultimate goal for patient to return to his group home as soon as aggressive behavior resolves.    Legal: Continue voluntary hospitalization by guardian    Safety:  - Continue precautions as noted above  - Status 15 minute checks    Disposition: Pending clinical stabilization. Will return to  once stable.     Pt seen and discussed with my attending, MD Ivone Braun MD  PGY-2 Psychiatry Resident

## 2019-03-27 NOTE — PROGRESS NOTES
Pt denied thoughts of SI/SIB. Pt appeared to be responding to auditory hallucinations in his room. Pt was in his room for majority of the shift, withdrawn from staff and peers. Pt ate 100% of his dinner.     03/27/19 0316   Behavioral Health   Hallucinations auditory;appears responding   Thinking delusional   Orientation place: oriented   Memory confabulation   Insight poor   Judgement impaired   Eye Contact into space;staring;at examiner   Affect blunted, flat   Mood mood is calm   Physical Appearance/Attire untidy;disheveled   Hygiene neglected grooming - unclean body, hair, teeth   Suicidality other (see comments)  (denies; non-observed)   1. Wish to be Dead No   2. Non-Specific Active Suicidal Thoughts  No   Self Injury other (see comment)  (denied; non-observed)   Speech clear   Activities of Daily Living   Hygiene/Grooming independent;prompts   Oral Hygiene independent;prompts   Dress independent   Laundry unable to complete   Room Organization prompts

## 2019-03-28 LAB
GLUCOSE BLDC GLUCOMTR-MCNC: 110 MG/DL (ref 70–99)
GLUCOSE BLDC GLUCOMTR-MCNC: 114 MG/DL (ref 70–99)

## 2019-03-28 PROCEDURE — 25000132 ZZH RX MED GY IP 250 OP 250 PS 637: Performed by: PSYCHIATRY & NEUROLOGY

## 2019-03-28 PROCEDURE — 25000132 ZZH RX MED GY IP 250 OP 250 PS 637: Performed by: STUDENT IN AN ORGANIZED HEALTH CARE EDUCATION/TRAINING PROGRAM

## 2019-03-28 PROCEDURE — 25000132 ZZH RX MED GY IP 250 OP 250 PS 637: Performed by: EMERGENCY MEDICINE

## 2019-03-28 PROCEDURE — 00000146 ZZHCL STATISTIC GLUCOSE BY METER IP

## 2019-03-28 PROCEDURE — 12400001 ZZH R&B MH UMMC

## 2019-03-28 PROCEDURE — 25000132 ZZH RX MED GY IP 250 OP 250 PS 637: Performed by: PHYSICIAN ASSISTANT

## 2019-03-28 RX ADMIN — METFORMIN HYDROCHLORIDE 1000 MG: 500 TABLET ORAL at 08:14

## 2019-03-28 RX ADMIN — ARIPIPRAZOLE 10 MG: 10 TABLET ORAL at 08:13

## 2019-03-28 RX ADMIN — DIVALPROEX SODIUM 500 MG: 500 TABLET, DELAYED RELEASE ORAL at 08:14

## 2019-03-28 RX ADMIN — METFORMIN HYDROCHLORIDE 1000 MG: 500 TABLET ORAL at 17:44

## 2019-03-28 RX ADMIN — PANTOPRAZOLE SODIUM 20 MG: 20 TABLET, DELAYED RELEASE ORAL at 08:13

## 2019-03-28 RX ADMIN — NIACIN 500 MG: 500 TABLET, EXTENDED RELEASE ORAL at 18:22

## 2019-03-28 RX ADMIN — ESCITALOPRAM 20 MG: 20 TABLET, FILM COATED ORAL at 08:14

## 2019-03-28 RX ADMIN — CLOZAPINE 200 MG: 100 TABLET ORAL at 08:13

## 2019-03-28 RX ADMIN — DIVALPROEX SODIUM 1000 MG: 500 TABLET, DELAYED RELEASE ORAL at 18:22

## 2019-03-28 RX ADMIN — SIMVASTATIN 40 MG: 40 TABLET, FILM COATED ORAL at 18:22

## 2019-03-28 RX ADMIN — CLOZAPINE 450 MG: 100 TABLET ORAL at 18:22

## 2019-03-28 RX ADMIN — FENOFIBRATE 160 MG: 160 TABLET, FILM COATED ORAL at 08:13

## 2019-03-28 RX ADMIN — ALUMINUM HYDROXIDE, MAGNESIUM HYDROXIDE, AND DIMETHICONE 30 ML: 400; 400; 40 SUSPENSION ORAL at 17:44

## 2019-03-28 RX ADMIN — PROPRANOLOL HYDROCHLORIDE 80 MG: 80 CAPSULE, EXTENDED RELEASE ORAL at 08:13

## 2019-03-28 ASSESSMENT — ACTIVITIES OF DAILY LIVING (ADL)
ORAL_HYGIENE: INDEPENDENT
HYGIENE/GROOMING: PROMPTS
LAUNDRY: UNABLE TO COMPLETE
DRESS: SCRUBS (BEHAVIORAL HEALTH);INDEPENDENT
ORAL_HYGIENE: PROMPTS
DRESS: SCRUBS (BEHAVIORAL HEALTH);INDEPENDENT
HYGIENE/GROOMING: INDEPENDENT

## 2019-03-28 NOTE — PROGRESS NOTES
Patient was mostly isolative to his room other than coming out for dinner and to watch a little tv. Patient is isolative even when out in the lounge. No complaints of pain but still appears to be responding to external stimuli.

## 2019-03-28 NOTE — PROGRESS NOTES
03/28/19 1456   Behavioral Health   Hallucinations denies / not responding to hallucinations   Thinking poor concentration;distractable;delusional;paranoid   Orientation person: oriented;place: oriented   Memory short term   Insight poor   Judgement impaired   Eye Contact staring;at floor;into space   Affect blunted, flat   Mood mood is calm   Physical Appearance/Attire untidy;disheveled   Hygiene neglected grooming - unclean body, hair, teeth   Suicidality other (see comments)  (denies)   1. Wish to be Dead No   2. Non-Specific Active Suicidal Thoughts  No   Self Injury other (see comment)  (delusional statements (punched himself in face, no evidence)   Elopement (none noted)   Activity withdrawn;isolative   Speech clear;coherent   Medication Sensitivity no stated side effects;no observed side effects   Psychomotor / Gait balanced;steady   Overt Aggression Scale   Verbal Aggression 0   Aggression against Property 0   Auto-Aggression 0   Physical Aggression 0   Overt Aggression Total Score 0   Activities of Daily Living   Hygiene/Grooming independent   Oral Hygiene independent   Dress scrubs (behavioral health);independent   Laundry unable to complete   Room Organization independent     Pt was out in the lounge for much of the shift. Pt had a visit from his group home workers which seemed to go well. Pt denied SI, but when asked about SIB, pt stated he punched himself in the face. No staff observed this and there was no immediate injury on his face. Pt was polite and engaged in appropriate conversation topics while in the unge.

## 2019-03-29 LAB
GLUCOSE BLDC GLUCOMTR-MCNC: 122 MG/DL (ref 70–99)
GLUCOSE BLDC GLUCOMTR-MCNC: 161 MG/DL (ref 70–99)

## 2019-03-29 PROCEDURE — 25000132 ZZH RX MED GY IP 250 OP 250 PS 637: Performed by: PSYCHIATRY & NEUROLOGY

## 2019-03-29 PROCEDURE — 25000132 ZZH RX MED GY IP 250 OP 250 PS 637: Performed by: EMERGENCY MEDICINE

## 2019-03-29 PROCEDURE — 25000132 ZZH RX MED GY IP 250 OP 250 PS 637: Performed by: PHYSICIAN ASSISTANT

## 2019-03-29 PROCEDURE — 25000132 ZZH RX MED GY IP 250 OP 250 PS 637: Performed by: STUDENT IN AN ORGANIZED HEALTH CARE EDUCATION/TRAINING PROGRAM

## 2019-03-29 PROCEDURE — 12400001 ZZH R&B MH UMMC

## 2019-03-29 PROCEDURE — H2032 ACTIVITY THERAPY, PER 15 MIN: HCPCS

## 2019-03-29 PROCEDURE — 00000146 ZZHCL STATISTIC GLUCOSE BY METER IP

## 2019-03-29 PROCEDURE — 99232 SBSQ HOSP IP/OBS MODERATE 35: CPT | Mod: GC | Performed by: PSYCHIATRY & NEUROLOGY

## 2019-03-29 PROCEDURE — 25000128 H RX IP 250 OP 636: Performed by: PSYCHIATRY & NEUROLOGY

## 2019-03-29 RX ORDER — DIVALPROEX SODIUM 500 MG/1
TABLET, DELAYED RELEASE ORAL
Qty: 90 TABLET | Refills: 0 | Status: ON HOLD | OUTPATIENT
Start: 2019-03-29 | End: 2019-08-27

## 2019-03-29 RX ORDER — ESCITALOPRAM OXALATE 20 MG/1
20 TABLET ORAL DAILY
Qty: 30 TABLET | Refills: 0 | Status: ON HOLD | OUTPATIENT
Start: 2019-03-29 | End: 2019-08-27

## 2019-03-29 RX ORDER — CLOZAPINE 200 MG/1
TABLET ORAL
Qty: 90 TABLET | Refills: 0 | Status: SHIPPED | OUTPATIENT
Start: 2019-03-29 | End: 2019-04-02

## 2019-03-29 RX ORDER — ARIPIPRAZOLE 10 MG/1
10 TABLET ORAL DAILY
Qty: 30 TABLET | Refills: 0 | Status: SHIPPED | OUTPATIENT
Start: 2019-03-30 | End: 2019-03-29

## 2019-03-29 RX ORDER — ARIPIPRAZOLE 10 MG/1
10 TABLET ORAL DAILY
Qty: 30 TABLET | Refills: 0 | Status: ON HOLD | OUTPATIENT
Start: 2019-03-30 | End: 2019-08-27

## 2019-03-29 RX ORDER — CLOZAPINE 50 MG/1
50 TABLET ORAL AT BEDTIME
Qty: 30 TABLET | Refills: 0 | Status: SHIPPED | OUTPATIENT
Start: 2019-03-29 | End: 2019-04-02

## 2019-03-29 RX ORDER — MEDROXYPROGESTERONE ACETATE 150 MG/ML
300 INJECTION, SUSPENSION INTRAMUSCULAR
Qty: 2 ML | Refills: 0 | OUTPATIENT
Start: 2019-03-29 | End: 2019-06-26

## 2019-03-29 RX ORDER — NIACIN 500 MG/1
500 TABLET, EXTENDED RELEASE ORAL AT BEDTIME
Qty: 30 TABLET | Refills: 0 | Status: ON HOLD | OUTPATIENT
Start: 2019-03-29 | End: 2019-08-27

## 2019-03-29 RX ORDER — PROPRANOLOL HYDROCHLORIDE 80 MG/1
80 CAPSULE, EXTENDED RELEASE ORAL DAILY
Qty: 30 CAPSULE | Refills: 0 | Status: ON HOLD | OUTPATIENT
Start: 2019-03-29 | End: 2019-08-27

## 2019-03-29 RX ORDER — DIVALPROEX SODIUM 500 MG/1
TABLET, DELAYED RELEASE ORAL
Qty: 90 TABLET | Refills: 0 | Status: SHIPPED | OUTPATIENT
Start: 2019-03-29 | End: 2019-03-29

## 2019-03-29 RX ORDER — PHENOL 1.4 %
10 AEROSOL, SPRAY (ML) MUCOUS MEMBRANE
Qty: 15 TABLET | Refills: 0 | Status: ON HOLD | OUTPATIENT
Start: 2019-03-29 | End: 2019-08-27

## 2019-03-29 RX ORDER — FENOFIBRATE 160 MG/1
160 TABLET ORAL DAILY
Qty: 30 TABLET | Refills: 0 | Status: ON HOLD | OUTPATIENT
Start: 2019-03-29 | End: 2019-08-27

## 2019-03-29 RX ORDER — SIMVASTATIN 40 MG
40 TABLET ORAL AT BEDTIME
Qty: 30 TABLET | Refills: 0 | Status: ON HOLD | OUTPATIENT
Start: 2019-03-29 | End: 2019-08-27

## 2019-03-29 RX ADMIN — ESCITALOPRAM 20 MG: 20 TABLET, FILM COATED ORAL at 08:30

## 2019-03-29 RX ADMIN — CLOZAPINE 450 MG: 100 TABLET ORAL at 20:33

## 2019-03-29 RX ADMIN — METFORMIN HYDROCHLORIDE 1000 MG: 500 TABLET ORAL at 18:18

## 2019-03-29 RX ADMIN — MEDROXYPROGESTERONE ACETATE 150 MG: 150 INJECTION, SUSPENSION INTRAMUSCULAR at 18:19

## 2019-03-29 RX ADMIN — ARIPIPRAZOLE 10 MG: 10 TABLET ORAL at 08:30

## 2019-03-29 RX ADMIN — SIMVASTATIN 40 MG: 40 TABLET, FILM COATED ORAL at 20:33

## 2019-03-29 RX ADMIN — METFORMIN HYDROCHLORIDE 1000 MG: 500 TABLET ORAL at 08:30

## 2019-03-29 RX ADMIN — PANTOPRAZOLE SODIUM 20 MG: 20 TABLET, DELAYED RELEASE ORAL at 08:30

## 2019-03-29 RX ADMIN — NIACIN 500 MG: 500 TABLET, EXTENDED RELEASE ORAL at 20:33

## 2019-03-29 RX ADMIN — ACETAMINOPHEN 650 MG: 325 TABLET, FILM COATED ORAL at 21:24

## 2019-03-29 RX ADMIN — DIVALPROEX SODIUM 500 MG: 500 TABLET, DELAYED RELEASE ORAL at 08:30

## 2019-03-29 RX ADMIN — FENOFIBRATE 160 MG: 160 TABLET, FILM COATED ORAL at 08:30

## 2019-03-29 RX ADMIN — PROPRANOLOL HYDROCHLORIDE 80 MG: 80 CAPSULE, EXTENDED RELEASE ORAL at 08:30

## 2019-03-29 RX ADMIN — CLOZAPINE 200 MG: 100 TABLET ORAL at 08:30

## 2019-03-29 RX ADMIN — DIVALPROEX SODIUM 1000 MG: 500 TABLET, DELAYED RELEASE ORAL at 20:33

## 2019-03-29 ASSESSMENT — ACTIVITIES OF DAILY LIVING (ADL)
DRESS: SCRUBS (BEHAVIORAL HEALTH)
ORAL_HYGIENE: INDEPENDENT
DRESS: INDEPENDENT
ORAL_HYGIENE: INDEPENDENT
LAUNDRY: UNABLE TO COMPLETE
HYGIENE/GROOMING: INDEPENDENT
HYGIENE/GROOMING: INDEPENDENT

## 2019-03-29 NOTE — PROGRESS NOTES
Patient approached med room and stated that he is constipated.  This RN spoke with patient and reminded him that he reportedly had diarrhea last evening.  He agreed that he did and RN suggested to him that we closely monitor his bowel movements over the next few days. If he is having diarrhea we don't want to give him meds meant for constipation.  He was agreeable to that. Will pass on in report that he needs to be reminded that any bowel movements or diarrhea need to be observed by his nurse.

## 2019-03-29 NOTE — PROGRESS NOTES
"[facility], [place]   Psychiatric Progress Note  Hospital Day: 47        Interim History:   The patient's care was discussed with the treatment team during the daily team meeting and/or staff's chart notes were reviewed. Staff report patient did not have any outbursts overnight, or require any PRNs. He has been visible in the milieu. Group home staff visited yesterday and told staff they feel he is near baseline. He reported indigestion and took Maalox, but later in the day was incontinent of stool. He did shower after that. Vital signs were within normal limits and he slept for 6 hours.    Upon interview, the patient reported that he is feeling \"pretty good.\" He has been watching TV and drawing pictures of \" people\" and \"flowers in Heaven.\" He was visited by his group home staff, who brought him pop, snacks, and his favorite car magazine. He states that he asked someone to  him 15 years ago but she said no. This was hard for him.     He describes having \"visions\" of several things, including someone in the lounge who told him he is \"a alexey shen,\" and people going to Hell. However, he states that these are \"in my imagination.\" When asked about his digestion, he states that he \"lost 70 pounds in 1.5 days\" and asks if he should eat lunch today, or just watch TV. He does endorse ongoing thoughts that someone named Garret is \"taking my medications out of my stomach,\" and thoughts that \"I'm on the wrong meds,\" and states, \"but that's in my imagination, you guys wouldn't do that, right?\" Validated this questioning of delusional content. Discussed the importance of good nutrition. He stated that he was \"making love to my trays.\" When asked about people giving him dirty looks, he states someone was in the lounge, but \"I don't remember\" who it was. Denies fears that he is going to Hell. States he is looking forward to discharge.           Medications:       ARIPiprazole  10 mg Oral " Daily     cloZAPine  200 mg Oral Daily     cloZAPine  450 mg Oral At Bedtime     divalproex sodium delayed-release  1,000 mg Oral At Bedtime     divalproex sodium delayed-release  500 mg Oral Daily     escitalopram  20 mg Oral Daily     fenofibrate  160 mg Oral Daily     medroxyPROGESTERone  150 mg Intramuscular Q14 Days     metFORMIN  1,000 mg Oral BID w/meals     niacin ER  500 mg Oral At Bedtime     pantoprazole  20 mg Oral Daily     propranolol ER  80 mg Oral Daily     simvastatin  40 mg Oral At Bedtime          Allergies:   No Known Allergies       Labs:     Recent Results (from the past 24 hour(s))   Glucose by meter    Collection Time: 03/28/19  4:41 PM   Result Value Ref Range    Glucose 114 (H) 70 - 99 mg/dL   Glucose by meter    Collection Time: 03/29/19  7:48 AM   Result Value Ref Range    Glucose 122 (H) 70 - 99 mg/dL          Psychiatric Examination:     /62   Pulse 94   Temp 99.2  F (37.3  C) (Tympanic)   Resp 16   Wt 92.5 kg (204 lb)   SpO2 97%   Weight is 204 lbs 0 oz  There is no height or weight on file to calculate BMI.    Weight over time:  Vitals:    02/09/19 0727 02/26/19 0800 03/20/19 0800   Weight: 95 kg (209 lb 8 oz) 93.4 kg (206 lb) 92.5 kg (204 lb)       Orthostatic Vitals       Most Recent      Sitting Orthostatic /71 03/04 1930    Sitting Orthostatic Pulse (bpm) 91 03/04 1930    Standing Orthostatic /53 03/04 1930    Standing Orthostatic Pulse (bpm) 99 03/04 1930        Cardiometabolic risk assessment. 03/05/19      Reviewed patient profile for cardiometabolic risk factors    Date taken /Value  REFERENCE RANGE   Abdominal Obesity  (Waist Circumference)   See nursing flowsheet Women ?35 in (88 cm)   Men ?40 in (102 cm)      Triglycerides  No results found for: TRIG    ?150 mg/dL (1.7 mmol/L) or current treatment for elevated triglycerides   HDL cholesterol  No results found for: HDL]   Women <50 mg/dL (1.3 mmol/L) in women or current treatment for low HDL  "cholesterol  Men <40 mg/dL (1 mmol/L) in men or current treatment for low HDL cholesterol     Fasting plasma glucose (FPG) Lab Results   Component Value Date     02/28/2019      FPG ?100 mg/dL (5.6 mmol/L) or treatment for elevated blood glucose   Blood pressure  BP Readings from Last 3 Encounters:   03/28/19 119/62    Blood pressure ?130/85 mmHg or treatment for elevated blood pressure   Family History  See family history   Appearance: awake, alert, unkempt and disheveled . Sitting comfortably on a chair in the lounge, hands in his lap, walks to his room calmly and sits on his bed for the interview.  Attitude:  cooperative  Eye Contact:  good  Mood: \"pretty good\"  Affect: mood congruent, anxious and constricted mobility  Speech:  clear, coherent, slowed. Frequent pauses, says \"mm hmm\" frequently in the middle of sentences.  Language: fluent and intact in English  Psychomotor, Gait, Musculoskeletal: Intermittent, apparently involuntary movements of the hands and feet (foot tapping, flexion/extension of the fingers), do not appear bothersome to the patient. Somewhat less noticeable than 2 days ago.  Throught Process: disorganized, illogical and tangential. Occasional interjections about unrelated subjects, particularly Advent ideas. Partially challenging the delusional content about medications being removed from his stomach.  Associations: Loosening of associations present  Thought Content: Denies suicidal ideation and homicidal ideation  Insight:  limited  Judgment:  limited  Oriented to: person only  Attention Span and Concentration:  fair  Recent and Remote Memory:  fair  Fund of Knowledge:  delayed    Clinical Global Impressions  First:  Considering your total clinical experience with this particular patient population, how severe are the patient's symptoms at this time?: 7 (02/11/19 6353)  Compared to the patient's condition at the START of treatment, this patient's condition is:: 4 (02/11/19 " 9513)  Most recent:  Considering your total clinical experience with this particular patient population, how severe are the patient's symptoms at this time?: 5 (02/27/19 0710)  Compared to the patient's condition at the START of treatment, this patient's condition is:: 2 (02/27/19 0710)           Precautions:     Behavioral Orders   Procedures     Assault precautions     Code 1 - Restrict to Unit     Routine Programming     As clinically indicated     Sexual precautions     Status 15     Every 15 minutes.     Suicide precautions     Patients on Suicide Precautions should have a Combination Diet ordered that includes a Diet selection(s) AND a Behavioral Tray selection for Safe Tray - with utensils, or Safe Tray - NO utensils            Diagnoses:   Schizophrenia, decompensated  Mild intellectual disability  History of tardive dyskinesia  History of traumatic brain injury  History of major depressive disorder and anxiety          Assessment & Plan:     Assessment and hospital summary:  Brayden Adrian with a past medical history of tardive dyskinesia, intellectual disability, schizoaffective disorder bipolar type, hypertension, apnea, GERD, diabetes type 2, depression, anxiety was admitted 2/8/2019 from group home for increased sexual inappropriateness, aggressive behaviors, and worsening function. Aggression has  Improved since addition of Depakote.    Target psychiatric symptoms and interventions:  Abilify increased to 10 mg daily on 3/5.  Increased to 15 mg daily on 3/8. Discussed R/B/A with guardian, his father Jose. Worsening in behavior observed since recent increase with more prominent EPSE, so dose was reduced back to 10 mg daily.  -Abilify 10 mg PO daily  -Depakote started 3/12, current dose 500 mg PO qAM and 1000 mg PO qHS  -Level on 1500 mg/day on 3/26 was 89. Will maintain this dose.  Guardian strongly prefers that we avoid Haldol given history of involuntary movements (observed by guardian most  recently on 2/27)    Medical Problems and Treatments:  Reported Diarrhea and Headache: Pt is very poor historian and inconsistently reports physical health concerns. Will continue to monitor closely.   Ongoing intermittent orthostatic hypotension and tachycardia  Per IM note dated 3/2: Please notify Internal Medicine if patient is persistently tachycardic >120bpm or if patient has hypotension of SBP <90 or DBP <60 or if he is symptomatic with dizziness, lightheadedness, SOB or chest pain.   -Will reconsult medicine if this persists.  Patient is poor historian and inconsistent in his reports with regards to physical health symptoms.    Urinary frequency/dysuria noted 3/14: Afebrile, no systemic symptoms or signs  -UA normal, low concern for UTI    Behavioral/Psychological/Social:  -Recommend staff stay out of arms length and perhaps recommend that he is sitting down so he cannot grab at the interviewer.   -Working toward expanding privileges if he can maintain nonviolent behavior. SIO discontinued on 3/26/19.  -Recommend frequent reminders and reassurance about goals for discharge, including ultimate goal for patient to return to his group home as soon as aggressive behavior resolves.    Legal: Continue voluntary hospitalization by guardian    Safety:  - Continue precautions as noted above  - Status 15 minute checks    Disposition: Discharge to group home early next week, pending continued stability.     Pt seen and discussed with my attending, MD Ivone Braun MD  PGY-2 Psychiatry Resident

## 2019-03-29 NOTE — PLAN OF CARE
Patient presents as quiet, disorganized, and internally preoccupied.  He has been increasingly visible in the milieu since his SIO was discontinued.  He remains socially withdrawn and rarely interacts with staff or peers when visible in the lounge.  He does not respond directly when asked about hallucinations.  He does appear to be experiencing some thought blocking, and his speech is notably delayed.  He did not make any overtly delusional or hyper-Holiness comments during our discussions this evening.  The group home staff that visited during the day shift noted significant improvements and feel that he is likely at or near his baseline level of functioning.  Brayden had no aggressive toward himself or others this evening.  He was cooperative with vital signs and blood glucose assessments ([id number] this evening before dinner).  He accepted all of his scheduled medications without incident and did not complain of any worsening of EPSE.  He c/o indigestion and was given a dose of PRN Maalox this evening.  He also had an episode of stool incontinence.  With prompting from unit staff, he was able to get into the shower to attend to his hygiene maintenance.  Unit staff provided clean linens while Brayden showered.

## 2019-03-29 NOTE — PROGRESS NOTES
Clintonr spoke with Group Home contact [phone]-[phone]-  discussed potential discharge.  She will discuss with their Team at the meeting this AM, she thought it sounds possible to discharge on Monday or Tuesday.  She also requested an order for Pt's next depo shot which is due in 15 days.  She will call the clinic- [facility] phone- [phone] FAX- [phone].  If they will not schedule the shot without a prescription she will call back to get a  Order.  She will also confirm yes or no on Team's perception on ready to discharge.    Clintonr spoke with Aretha at Group Home.  Agree to Tuesday 11 am discharge.  They would [facility] [place].  Discharge orders should also include order for depo shot.

## 2019-03-30 LAB — GLUCOSE BLDC GLUCOMTR-MCNC: 111 MG/DL (ref 70–99)

## 2019-03-30 PROCEDURE — 00000146 ZZHCL STATISTIC GLUCOSE BY METER IP

## 2019-03-30 PROCEDURE — 25000132 ZZH RX MED GY IP 250 OP 250 PS 637: Performed by: STUDENT IN AN ORGANIZED HEALTH CARE EDUCATION/TRAINING PROGRAM

## 2019-03-30 PROCEDURE — 12400001 ZZH R&B MH UMMC

## 2019-03-30 PROCEDURE — 25000132 ZZH RX MED GY IP 250 OP 250 PS 637: Performed by: EMERGENCY MEDICINE

## 2019-03-30 PROCEDURE — 25000132 ZZH RX MED GY IP 250 OP 250 PS 637: Performed by: PHYSICIAN ASSISTANT

## 2019-03-30 PROCEDURE — 25000132 ZZH RX MED GY IP 250 OP 250 PS 637: Performed by: PSYCHIATRY & NEUROLOGY

## 2019-03-30 RX ADMIN — NIACIN 500 MG: 500 TABLET, EXTENDED RELEASE ORAL at 19:23

## 2019-03-30 RX ADMIN — METFORMIN HYDROCHLORIDE 1000 MG: 500 TABLET ORAL at 07:50

## 2019-03-30 RX ADMIN — ARIPIPRAZOLE 10 MG: 10 TABLET ORAL at 07:50

## 2019-03-30 RX ADMIN — SIMVASTATIN 40 MG: 40 TABLET, FILM COATED ORAL at 19:23

## 2019-03-30 RX ADMIN — ESCITALOPRAM 20 MG: 20 TABLET, FILM COATED ORAL at 07:50

## 2019-03-30 RX ADMIN — FENOFIBRATE 160 MG: 160 TABLET, FILM COATED ORAL at 07:50

## 2019-03-30 RX ADMIN — DIVALPROEX SODIUM 1000 MG: 500 TABLET, DELAYED RELEASE ORAL at 19:23

## 2019-03-30 RX ADMIN — CLOZAPINE 200 MG: 100 TABLET ORAL at 07:49

## 2019-03-30 RX ADMIN — DIVALPROEX SODIUM 500 MG: 500 TABLET, DELAYED RELEASE ORAL at 07:49

## 2019-03-30 RX ADMIN — PANTOPRAZOLE SODIUM 20 MG: 20 TABLET, DELAYED RELEASE ORAL at 07:50

## 2019-03-30 RX ADMIN — PROPRANOLOL HYDROCHLORIDE 80 MG: 80 CAPSULE, EXTENDED RELEASE ORAL at 07:49

## 2019-03-30 RX ADMIN — METFORMIN HYDROCHLORIDE 1000 MG: 500 TABLET ORAL at 18:07

## 2019-03-30 RX ADMIN — CLOZAPINE 450 MG: 100 TABLET ORAL at 19:23

## 2019-03-30 ASSESSMENT — ACTIVITIES OF DAILY LIVING (ADL)
HYGIENE/GROOMING: PROMPTS
DRESS: SCRUBS (BEHAVIORAL HEALTH)
ORAL_HYGIENE: PROMPTS
DRESS: SCRUBS (BEHAVIORAL HEALTH);INDEPENDENT
ORAL_HYGIENE: PROMPTS
LAUNDRY: UNABLE TO COMPLETE
HYGIENE/GROOMING: PROMPTS;HANDWASHING

## 2019-03-30 NOTE — PLAN OF CARE
Patient is spending more time out of his room.  He is not interacting with peers but will interact with staff when staff initiates.  When he was offered his scheduled AM meds today he asked writer if he should drink all of the water in the cup. He makes frequent remarks similar to that one. He is taking his scheduled medications cooperatively. Patient spent approximately 60% of shift in his room.  Pt denies suicide ideation.  His thinking appears illogical making odd statements about body parts. No problematic or inappropriate behavior during the shift.

## 2019-03-30 NOTE — PROGRESS NOTES
Brayden was visible in the milieu some of this shift. He was socially withdrawn but pleasant and good-natured upon approach. Brayden did not attempt to touch people inappropriately this shift. He watched television in the lounge. He made several bizarre and non-sequitur statements, most of which seemed to amuse him.       03/29/19 2123   Behavioral Health   Hallucinations appears responding   Thinking delusional   Orientation person: oriented;place: oriented   Memory baseline memory   Insight poor   Judgement impaired   Eye Contact at examiner   Affect full range affect   Mood mood is calm   Physical Appearance/Attire attire appropriate to age and situation   Hygiene neglected grooming - unclean body, hair, teeth   1. Wish to be Dead No   2. Non-Specific Active Suicidal Thoughts  No   Activity withdrawn   Speech other (see comments)  (non sequitur statements)   Activities of Daily Living   Hygiene/Grooming independent   Oral Hygiene independent   Dress scrubs (behavioral health)   Room Organization independent

## 2019-03-30 NOTE — PLAN OF CARE
"Patient isolative and withdrawn most of the evening with short instances of sitting in the lounge quietly occasionally laughing when other patients or staff laughing. Patient calm and approachable to staff often with delayed responses and averted eye contact. Patient reports overall mood as \"good\" indicating that he has come out to the lounge a couple times today stating \"I have been minding my spirit with the help of my friend tyrone a couple rooms down\". No observed interaction between patient and peers so possibly indicating delusional thought or hallucinations. Patient denied auditory or visual hallucinations though throughout conversation was delayed in response and often looking away from staff. Drawings were noticed on the patients bedside table, patient reports that the drawings are of \" people having sex\". Patient denies SI/SIB with no noted agitation this shift. PT accepting of HS medications with no noted side effects.   "

## 2019-03-30 NOTE — PROGRESS NOTES
Participated in Music Therapy group with focus on mood elevation, validation and decreasing anxiety and improved group cohesiveness. Engaged and cooperative in music listening interventions.   Showed progress in session goals. Chose Holiday and Patriotic songs when it was his turn. ([employer] and [employer] Beautiful).    At one point, got up and danced with hands in the air for about 20 seconds.

## 2019-03-31 LAB — GLUCOSE BLDC GLUCOMTR-MCNC: 136 MG/DL (ref 70–99)

## 2019-03-31 PROCEDURE — 25000132 ZZH RX MED GY IP 250 OP 250 PS 637: Performed by: PHYSICIAN ASSISTANT

## 2019-03-31 PROCEDURE — 25000132 ZZH RX MED GY IP 250 OP 250 PS 637: Performed by: STUDENT IN AN ORGANIZED HEALTH CARE EDUCATION/TRAINING PROGRAM

## 2019-03-31 PROCEDURE — 25000132 ZZH RX MED GY IP 250 OP 250 PS 637: Performed by: PSYCHIATRY & NEUROLOGY

## 2019-03-31 PROCEDURE — 12400001 ZZH R&B MH UMMC

## 2019-03-31 PROCEDURE — 25000132 ZZH RX MED GY IP 250 OP 250 PS 637: Performed by: EMERGENCY MEDICINE

## 2019-03-31 PROCEDURE — 00000146 ZZHCL STATISTIC GLUCOSE BY METER IP

## 2019-03-31 RX ADMIN — CLOZAPINE 450 MG: 100 TABLET ORAL at 19:52

## 2019-03-31 RX ADMIN — PROPRANOLOL HYDROCHLORIDE 80 MG: 80 CAPSULE, EXTENDED RELEASE ORAL at 08:01

## 2019-03-31 RX ADMIN — ESCITALOPRAM 20 MG: 20 TABLET, FILM COATED ORAL at 08:01

## 2019-03-31 RX ADMIN — ARIPIPRAZOLE 10 MG: 10 TABLET ORAL at 08:01

## 2019-03-31 RX ADMIN — METFORMIN HYDROCHLORIDE 1000 MG: 500 TABLET ORAL at 08:01

## 2019-03-31 RX ADMIN — DIVALPROEX SODIUM 500 MG: 500 TABLET, DELAYED RELEASE ORAL at 08:01

## 2019-03-31 RX ADMIN — ACETAMINOPHEN 650 MG: 325 TABLET, FILM COATED ORAL at 21:04

## 2019-03-31 RX ADMIN — METFORMIN HYDROCHLORIDE 1000 MG: 500 TABLET ORAL at 17:49

## 2019-03-31 RX ADMIN — ACETAMINOPHEN 650 MG: 325 TABLET, FILM COATED ORAL at 15:56

## 2019-03-31 RX ADMIN — SIMVASTATIN 40 MG: 40 TABLET, FILM COATED ORAL at 19:53

## 2019-03-31 RX ADMIN — CLOZAPINE 200 MG: 100 TABLET ORAL at 08:01

## 2019-03-31 RX ADMIN — NIACIN 500 MG: 500 TABLET, EXTENDED RELEASE ORAL at 19:52

## 2019-03-31 RX ADMIN — FENOFIBRATE 160 MG: 160 TABLET, FILM COATED ORAL at 08:01

## 2019-03-31 RX ADMIN — BENZTROPINE MESYLATE 1 MG: 0.5 TABLET ORAL at 19:53

## 2019-03-31 RX ADMIN — DIVALPROEX SODIUM 1000 MG: 500 TABLET, DELAYED RELEASE ORAL at 19:52

## 2019-03-31 RX ADMIN — PANTOPRAZOLE SODIUM 20 MG: 20 TABLET, DELAYED RELEASE ORAL at 08:01

## 2019-03-31 RX ADMIN — HYDROXYZINE HYDROCHLORIDE 50 MG: 25 TABLET ORAL at 19:53

## 2019-03-31 ASSESSMENT — ACTIVITIES OF DAILY LIVING (ADL)
DRESS: INDEPENDENT
HYGIENE/GROOMING: INDEPENDENT
HYGIENE/GROOMING: INDEPENDENT;PROMPTS
LAUNDRY: UNABLE TO COMPLETE
ORAL_HYGIENE: INDEPENDENT;PROMPTS
ORAL_HYGIENE: INDEPENDENT
DRESS: SCRUBS (BEHAVIORAL HEALTH)

## 2019-03-31 NOTE — PROGRESS NOTES
"   03/31/19 1427   Behavioral Health   Hallucinations denies / not responding to hallucinations   Thinking poor concentration   Memory baseline memory   Insight poor   Judgement impaired   Eye Contact at examiner   Affect blunted, flat   Mood mood is calm   Physical Appearance/Attire disheveled   Hygiene neglected grooming - unclean body, hair, teeth   1. Wish to be Dead No   2. Non-Specific Active Suicidal Thoughts  No   Activities of Daily Living   Hygiene/Grooming independent   Oral Hygiene independent   Dress scrubs (behavioral health)   Room Organization independent         Pt in and out of room. Asked many remedial questions, such as, \"should I go to my room?\", \"should I drink coffee?\". Overall calm. Open to check in. Denied SI/SIB.  "

## 2019-03-31 NOTE — PLAN OF CARE
"Patient verbalized multiple delusional and disorganized thoughts during interview in his room. Stated: \"I have a vision of getting \",\"But one can get legally  if the woman is really pretty\";\"I have a vision of my dream card\", \"But the doctor told me not to think about it\", \"People get benefits when I think about my dream card\"; \"Im paranoid about people running around and doing stuff, I can do the same thing because I did very good at school, getting A's and F's\". \"I have a vision I'm too young to be in the living room\". Patient denied thoughts of hurting self or others. Patient contracted not to hurt self or others on the unit.   Patient reported pain \"all over\" and being constipated today. Said he was able to have a bowel movement, but it took a long time.   Patient was observed to be calm in his bed. No behavioral problems at this time.   Patient accepted offered Tylenol 650 mg PO PRN for generalized pain. Will update MD about patient's report of constipation, as he may benefit from Colace.     Addendum:  Patient reported Tylenol as effective for generalized pain.  He complained of having muscular spasms. Discussed available PRNs, patient accepted PRN Cogentin 1 mg PO and Hydroxyzine 50 mg PO along with  Scheduled HS medications.   "

## 2019-04-01 LAB
GLUCOSE BLDC GLUCOMTR-MCNC: 109 MG/DL (ref 70–99)
GLUCOSE BLDC GLUCOMTR-MCNC: 183 MG/DL (ref 70–99)

## 2019-04-01 PROCEDURE — 99232 SBSQ HOSP IP/OBS MODERATE 35: CPT | Mod: GC | Performed by: PSYCHIATRY & NEUROLOGY

## 2019-04-01 PROCEDURE — 25000132 ZZH RX MED GY IP 250 OP 250 PS 637: Performed by: EMERGENCY MEDICINE

## 2019-04-01 PROCEDURE — 25000132 ZZH RX MED GY IP 250 OP 250 PS 637: Performed by: PSYCHIATRY & NEUROLOGY

## 2019-04-01 PROCEDURE — 00000146 ZZHCL STATISTIC GLUCOSE BY METER IP

## 2019-04-01 PROCEDURE — 25000132 ZZH RX MED GY IP 250 OP 250 PS 637: Performed by: STUDENT IN AN ORGANIZED HEALTH CARE EDUCATION/TRAINING PROGRAM

## 2019-04-01 PROCEDURE — 12400001 ZZH R&B MH UMMC

## 2019-04-01 RX ADMIN — METFORMIN HYDROCHLORIDE 1000 MG: 500 TABLET ORAL at 08:41

## 2019-04-01 RX ADMIN — CLOZAPINE 200 MG: 100 TABLET ORAL at 08:41

## 2019-04-01 RX ADMIN — ARIPIPRAZOLE 10 MG: 10 TABLET ORAL at 08:41

## 2019-04-01 RX ADMIN — ESCITALOPRAM 20 MG: 20 TABLET, FILM COATED ORAL at 08:41

## 2019-04-01 RX ADMIN — FENOFIBRATE 160 MG: 160 TABLET, FILM COATED ORAL at 08:41

## 2019-04-01 RX ADMIN — NIACIN 500 MG: 500 TABLET, EXTENDED RELEASE ORAL at 19:55

## 2019-04-01 RX ADMIN — DIVALPROEX SODIUM 1000 MG: 500 TABLET, DELAYED RELEASE ORAL at 19:55

## 2019-04-01 RX ADMIN — TRAZODONE HYDROCHLORIDE 50 MG: 50 TABLET ORAL at 03:00

## 2019-04-01 RX ADMIN — PANTOPRAZOLE SODIUM 20 MG: 20 TABLET, DELAYED RELEASE ORAL at 08:41

## 2019-04-01 RX ADMIN — METFORMIN HYDROCHLORIDE 1000 MG: 500 TABLET ORAL at 18:28

## 2019-04-01 RX ADMIN — DIVALPROEX SODIUM 500 MG: 500 TABLET, DELAYED RELEASE ORAL at 08:41

## 2019-04-01 RX ADMIN — SIMVASTATIN 40 MG: 40 TABLET, FILM COATED ORAL at 19:55

## 2019-04-01 RX ADMIN — CLOZAPINE 450 MG: 100 TABLET ORAL at 19:55

## 2019-04-01 RX ADMIN — HYDROXYZINE HYDROCHLORIDE 50 MG: 25 TABLET ORAL at 03:00

## 2019-04-01 RX ADMIN — ACETAMINOPHEN 650 MG: 325 TABLET, FILM COATED ORAL at 15:57

## 2019-04-01 NOTE — PLAN OF CARE
BEHAVIORAL TEAM DISCUSSION    Participants: dr bonilla, resident dr schulz, bella rae rn, christi coleman [facility]  Progress:  pt has made progress.   His behavior has been stable with no aggressive outbursts.   He still makes delusional statements.  He is sexually preoccupied.  Continued Stay Criteria/Rationale:  ready for discharge tomorrow.  Medical/Physical:  per internal medicine  Precautions:   Behavioral Orders   Procedures    Assault precautions    Code 1 - Restrict to Unit    Routine Programming     As clinically indicated    Sexual precautions    Status 15     Every 15 minutes.    Suicide precautions     Patients on Suicide Precautions should have a Combination Diet ordered that includes a Diet selection(s) AND a Behavioral Tray selection for Safe Tray - with utensils, or Safe Tray - NO utensils       Plan: meds per dr bonilla and resident dr schulz.    Discharge back to group home tomorrow  Rationale for change in precautions or plan: no change at this time

## 2019-04-01 NOTE — PROGRESS NOTES
"[facility], [facility]   Psychiatric Progress Note  Hospital Day: 50        Interim History:   The patient's care was discussed with the treatment team during the daily team meeting and/or staff's chart notes were reviewed. Staff report patient did not have any outbursts over the weekend, or require any PRNs. He has been visible in the milieu. He frequently asked for advice and direction on ADLs such as eating and hygiene. He told staff that he was drawing pictures of \" people having sex.\" Vital signs were within normal limits. He had some difficulty falling asleep last night and endorsed \"anal spasms\" for which he took PRN Cogentin and Atarax.    Upon interview, the patient reported that he is feeling \"real good, OK.\" Over the weekend he watched TV and mariela pictures. [There are pictures on the desk by his bed which are decidedly not pornographic in nature but may depict people having sex.] He is looking forward to discharge back to his group home. When asked about what brought him into the hospital initially, he stated, \"they said I fought [a peer], when really I just did a wrestling move and shut the door to my room.\" Discussed the importance of going to his room when he wants to hit people, rather than hitting them, and the importance of not practicing martial arts outside the gym. He stated that he \"had a vision someone in the lounge told me I was a alexey dude,\" and then that \"I worked so hard for my dream car.\" He listed car parts when asked about his dream car, and when asked about the color, stated \"chrome.\" He stated that he \"prayed to God and went to [email or web address] last night.\" When asked about physical symptoms, he stated \"I had constipation last night, once. Then they gave me 60 pills and 13 meds.\" He also endorsed \"spasms in my spine,\" localizing to his upper back and shoulders. Discussed ways to prevent muscle stiffness when sitting in bed watching TV.     Called patient's " father and guardian, Jose Adrian, and discussed discharge plans. He is aware of and in agreement with plan for group home to pick the patient up tomorrow. Discussed follow-up with outpatient psychiatrist, medication changes while in the hospital, and reasons to return to the hospital.         Medications:       ARIPiprazole  10 mg Oral Daily     cloZAPine  200 mg Oral Daily     cloZAPine  450 mg Oral At Bedtime     divalproex sodium delayed-release  1,000 mg Oral At Bedtime     divalproex sodium delayed-release  500 mg Oral Daily     escitalopram  20 mg Oral Daily     fenofibrate  160 mg Oral Daily     medroxyPROGESTERone  150 mg Intramuscular Q14 Days     metFORMIN  1,000 mg Oral BID w/meals     niacin ER  500 mg Oral At Bedtime     pantoprazole  20 mg Oral Daily     propranolol ER  80 mg Oral Daily     simvastatin  40 mg Oral At Bedtime          Allergies:   No Known Allergies       Labs:     Recent Results (from the past 24 hour(s))   Glucose by meter    Collection Time: 03/31/19  5:51 PM   Result Value Ref Range    Glucose 136 (H) 70 - 99 mg/dL   Glucose by meter    Collection Time: 04/01/19 12:41 PM   Result Value Ref Range    Glucose 109 (H) 70 - 99 mg/dL          Psychiatric Examination:     /44   Pulse 86   Temp 99.7  F (37.6  C) (Tympanic)   Resp 16   Wt 92.5 kg (204 lb)   SpO2 99%   Weight is 204 lbs 0 oz  There is no height or weight on file to calculate BMI.    Weight over time:  Vitals:    02/09/19 0727 02/26/19 0800 03/20/19 0800   Weight: 95 kg (209 lb 8 oz) 93.4 kg (206 lb) 92.5 kg (204 lb)       Orthostatic Vitals       Most Recent      Sitting Orthostatic /71 03/04 1930    Sitting Orthostatic Pulse (bpm) 91 03/04 1930    Standing Orthostatic /53 03/04 1930    Standing Orthostatic Pulse (bpm) 99 03/04 1930        Cardiometabolic risk assessment. 03/05/19      Reviewed patient profile for cardiometabolic risk factors    Date taken /Value  REFERENCE RANGE   Abdominal  "Obesity  (Waist Circumference)   See nursing flowsheet Women ?35 in (88 cm)   Men ?40 in (102 cm)      Triglycerides  No results found for: TRIG    ?150 mg/dL (1.7 mmol/L) or current treatment for elevated triglycerides   HDL cholesterol  No results found for: HDL]   Women <50 mg/dL (1.3 mmol/L) in women or current treatment for low HDL cholesterol  Men <40 mg/dL (1 mmol/L) in men or current treatment for low HDL cholesterol     Fasting plasma glucose (FPG) Lab Results   Component Value Date     02/28/2019      FPG ?100 mg/dL (5.6 mmol/L) or treatment for elevated blood glucose   Blood pressure  BP Readings from Last 3 Encounters:   04/01/19 103/44    Blood pressure ?130/85 mmHg or treatment for elevated blood pressure   Family History  See family history   Appearance: awake, alert, unkempt and disheveled . Lying in bed, with neck flexed and head propped up against the wall.   Attitude:  cooperative and pleasant  Eye Contact:  good  Mood: \"pretty good\"  Affect: mood congruent, anxious and constricted mobility  Speech:  clear, coherent, slowed. Frequent pauses, says \"mm hmm\" frequently in the middle of sentences.  Language: fluent and intact in English  Psychomotor, Gait, Musculoskeletal: Intermittent, apparently involuntary movements of the hands and feet (foot tapping, flexion/extension of the fingers), do not appear bothersome to the patient. Less noticeable than 2 days ago. Appears comfortable.  Throught Process: disorganized, illogical and tangential. Occasional interjections about unrelated subjects, particularly Jew ideas and cars. Does not appear to be responding to internal stimuli currently.  Associations: Loosening of associations present  Thought Content: Denies suicidal ideation and homicidal ideation  Insight:  limited  Judgment:  limited  Oriented to: person only  Attention Span and Concentration:  fair  Recent and Remote Memory:  fair  Fund of Knowledge:  delayed    Clinical Global " Impressions  First:  Considering your total clinical experience with this particular patient population, how severe are the patient's symptoms at this time?: 7 (02/11/19 1643)  Compared to the patient's condition at the START of treatment, this patient's condition is:: 4 (02/11/19 1643)  Most recent:  Considering your total clinical experience with this particular patient population, how severe are the patient's symptoms at this time?: 5 (02/27/19 0710)  Compared to the patient's condition at the START of treatment, this patient's condition is:: 2 (02/27/19 0710)           Precautions:     Behavioral Orders   Procedures     Assault precautions     Code 1 - Restrict to Unit     Routine Programming     As clinically indicated     Sexual precautions     Status 15     Every 15 minutes.     Suicide precautions     Patients on Suicide Precautions should have a Combination Diet ordered that includes a Diet selection(s) AND a Behavioral Tray selection for Safe Tray - with utensils, or Safe Tray - NO utensils            Diagnoses:   Schizophrenia, decompensated  Mild intellectual disability  History of tardive dyskinesia  History of traumatic brain injury  History of major depressive disorder and anxiety          Assessment & Plan:     Assessment and hospital summary:  Brayden Adrian with a past medical history of tardive dyskinesia, intellectual disability, schizoaffective disorder bipolar type, hypertension, apnea, GERD, diabetes type 2, depression, anxiety was admitted 2/8/2019 from group home for increased sexual inappropriateness, aggressive behaviors, and worsening function. Aggression has  Improved since addition of Depakote.    Target psychiatric symptoms and interventions:  Abilify increased to 10 mg daily on 3/5.  Increased to 15 mg daily on 3/8. Discussed R/B/A with guardian, his father Joes. Worsening in behavior observed since recent increase with more prominent EPSE, so dose was reduced back to 10 mg  daily.  -Abilify 10 mg PO daily  -Depakote started 3/12, current dose 500 mg PO qAM and 1000 mg PO at bedtime. Level on 1500 mg/day on 3/26 was 89. Will maintain this dose.  Guardian strongly prefers that we avoid Haldol given history of involuntary movements (observed by guardian most recently on 2/27)  -Plan to discharge back to group home tomorrow    Medical Problems and Treatments:  Reported Diarrhea and Headache: Pt is very poor historian and inconsistently reports physical health concerns. Will continue to monitor closely.   Ongoing intermittent orthostatic hypotension and tachycardia  Per IM note dated 3/2: Please notify Internal Medicine if patient is persistently tachycardic >120bpm or if patient has hypotension of SBP <90 or DBP <60 or if he is symptomatic with dizziness, lightheadedness, SOB or chest pain.   -Will reconsult medicine if this persists.  Patient is poor historian and inconsistent in his reports with regards to physical health symptoms.    Urinary frequency/dysuria noted 3/14: Afebrile, no systemic symptoms or signs  -UA normal, low concern for UTI    Behavioral/Psychological/Social:  -Recommend staff stay out of arms length and perhaps recommend that he is sitting down so he cannot grab at the interviewer.   -Working toward expanding privileges if he can maintain nonviolent behavior. SIO discontinued on 3/26/19.  -Recommend frequent reminders and reassurance about goals for discharge, including ultimate goal for patient to return to his group home as soon as aggressive behavior resolves.    Legal: Continue voluntary hospitalization by guardian    Safety:  - Continue precautions as noted above  - Status 15 minute checks    Disposition: Discharge to group home tomorrow with staff, pending continued stability.     Pt seen and discussed with my attending, MD Ivone Braun MD  PGY-2 Psychiatry Resident

## 2019-04-02 VITALS
TEMPERATURE: 97.6 F | RESPIRATION RATE: 16 BRPM | DIASTOLIC BLOOD PRESSURE: 73 MMHG | HEART RATE: 112 BPM | OXYGEN SATURATION: 98 % | SYSTOLIC BLOOD PRESSURE: 110 MMHG | WEIGHT: 203 LBS

## 2019-04-02 LAB — GLUCOSE BLDC GLUCOMTR-MCNC: 148 MG/DL (ref 70–99)

## 2019-04-02 PROCEDURE — 25000132 ZZH RX MED GY IP 250 OP 250 PS 637: Performed by: EMERGENCY MEDICINE

## 2019-04-02 PROCEDURE — 25000132 ZZH RX MED GY IP 250 OP 250 PS 637: Performed by: PHYSICIAN ASSISTANT

## 2019-04-02 PROCEDURE — 25000132 ZZH RX MED GY IP 250 OP 250 PS 637: Performed by: PSYCHIATRY & NEUROLOGY

## 2019-04-02 PROCEDURE — 99239 HOSP IP/OBS DSCHRG MGMT >30: CPT | Mod: GC | Performed by: PSYCHIATRY & NEUROLOGY

## 2019-04-02 PROCEDURE — 25000132 ZZH RX MED GY IP 250 OP 250 PS 637: Performed by: STUDENT IN AN ORGANIZED HEALTH CARE EDUCATION/TRAINING PROGRAM

## 2019-04-02 PROCEDURE — 00000146 ZZHCL STATISTIC GLUCOSE BY METER IP

## 2019-04-02 RX ORDER — MEDROXYPROGESTERONE ACETATE 150 MG/ML
150 INJECTION, SUSPENSION INTRAMUSCULAR
Qty: 2 ML | Refills: 0 | Status: SHIPPED | OUTPATIENT
Start: 2019-04-12 | End: 2019-04-05

## 2019-04-02 RX ADMIN — PROPRANOLOL HYDROCHLORIDE 80 MG: 80 CAPSULE, EXTENDED RELEASE ORAL at 08:26

## 2019-04-02 RX ADMIN — DIVALPROEX SODIUM 500 MG: 500 TABLET, DELAYED RELEASE ORAL at 08:26

## 2019-04-02 RX ADMIN — METFORMIN HYDROCHLORIDE 1000 MG: 500 TABLET ORAL at 08:26

## 2019-04-02 RX ADMIN — PANTOPRAZOLE SODIUM 20 MG: 20 TABLET, DELAYED RELEASE ORAL at 08:26

## 2019-04-02 RX ADMIN — CLOZAPINE 200 MG: 100 TABLET ORAL at 08:26

## 2019-04-02 RX ADMIN — ESCITALOPRAM 20 MG: 20 TABLET, FILM COATED ORAL at 08:26

## 2019-04-02 RX ADMIN — ARIPIPRAZOLE 10 MG: 10 TABLET ORAL at 08:25

## 2019-04-02 RX ADMIN — FENOFIBRATE 160 MG: 160 TABLET, FILM COATED ORAL at 08:26

## 2019-04-02 ASSESSMENT — ACTIVITIES OF DAILY LIVING (ADL)
HYGIENE/GROOMING: PROMPTS
DRESS: SCRUBS (BEHAVIORAL HEALTH)
ORAL_HYGIENE: PROMPTS
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
## 2019-04-02 NOTE — PROGRESS NOTES
Patient states that he is ready to go back to group [facility].  He denies that he is suicidal. He also denies current hallucinations.  He is spending time out of his room but does not initiate any interactions with his peers.  He continues to ask for clarifications for a variety of things. Today he asked writer if he should swallow all of his pills at one time or should he take only some of them. Writer asked pt what he normally does and he hesitated. Writer reminded him that for the past few days he has taken them all at once.  He then took them all.  Patient waited patiently for the group home staff to come and pick him up.  He continues to have a somewhat flat affect but did laugh at one of the jokes writer told him.  Patient was discharged to group home staff with all belongings.  Meds were not filled here but were called in to other pharmacies.

## 2019-04-02 NOTE — PLAN OF CARE
Patient presents as quiet and disorganized with a blunted affect.  He is calm, pleasant, and cooperative on approach.  He has been visible in the milieu for portions of this shift and remains socially withdrawn.  He denies any AH at this time.  He continues to experience impoverished thought and speech latency.  He did not make any overtly delusional or hyper-Catholic comments this evening.  Brayden had no aggressive behaviors toward himself or others this evening.  He was cooperative with vital signs and blood glucose assessments ([id number] this evening before dinner, likely elevated from having recently eaten snack during ?reflection time?).  He accepted all of his scheduled medications without incident and did not complain of any worsening of EPSE.  He received a dose of PRN Tylenol this evening and later reported moderate relief from his back, neck pain.  Apart from this, he denies any acute physical concerns.  He looks forward to returning home to his [facility] placement tomorrow and had no further complaints or requests.

## 2019-04-05 RX ORDER — MEDROXYPROGESTERONE ACETATE 150 MG/ML
150 INJECTION, SUSPENSION INTRAMUSCULAR
Qty: 2 ML | Refills: 0 | Status: SHIPPED | OUTPATIENT
Start: 2019-04-12 | End: 2019-04-05

## 2019-04-05 RX ORDER — MEDROXYPROGESTERONE ACETATE 150 MG/ML
150 INJECTION, SUSPENSION INTRAMUSCULAR
Qty: 2 ML | Refills: 3 | Status: ON HOLD | OUTPATIENT
Start: 2019-04-12 | End: 2019-08-27

## 2019-04-08 ENCOUNTER — DOCUMENTATION ONLY (OUTPATIENT)
Dept: OTHER | Facility: CLINIC | Age: 54
End: 2019-04-08

## 2019-06-26 ENCOUNTER — HOSPITAL ENCOUNTER (INPATIENT)
Facility: CLINIC | Age: 54
LOS: 62 days | Discharge: NURSING, REHAB OR LONG-TERM CARE | DRG: 885 | End: 2019-08-27
Attending: FAMILY MEDICINE | Admitting: PSYCHIATRY & NEUROLOGY
Payer: COMMERCIAL

## 2019-06-26 DIAGNOSIS — F52.9 SEXUAL BEHAVIOR DISORDER: ICD-10-CM

## 2019-06-26 DIAGNOSIS — E11.8 TYPE 2 DIABETES MELLITUS WITH COMPLICATION, UNSPECIFIED WHETHER LONG TERM INSULIN USE: ICD-10-CM

## 2019-06-26 DIAGNOSIS — F20.0 PARANOID SCHIZOPHRENIA (H): ICD-10-CM

## 2019-06-26 DIAGNOSIS — R46.89 AGGRESSIVE BEHAVIOR: ICD-10-CM

## 2019-06-26 DIAGNOSIS — F20.9 SCHIZOPHRENIA, UNSPECIFIED TYPE (H): ICD-10-CM

## 2019-06-26 DIAGNOSIS — E78.5 HYPERLIPIDEMIA LDL GOAL <160: ICD-10-CM

## 2019-06-26 DIAGNOSIS — I10 ESSENTIAL HYPERTENSION: ICD-10-CM

## 2019-06-26 DIAGNOSIS — E11.8 TYPE 2 DIABETES MELLITUS WITH COMPLICATION, WITHOUT LONG-TERM CURRENT USE OF INSULIN (H): ICD-10-CM

## 2019-06-26 PROBLEM — F29 PSYCHOSIS (H): Status: ACTIVE | Noted: 2019-06-26

## 2019-06-26 LAB
ALBUMIN SERPL-MCNC: 3.7 G/DL (ref 3.4–5)
ALBUMIN UR-MCNC: 10 MG/DL
ALP SERPL-CCNC: 70 U/L (ref 40–150)
ALT SERPL W P-5'-P-CCNC: 39 U/L (ref 0–70)
ANION GAP SERPL CALCULATED.3IONS-SCNC: 8 MMOL/L (ref 3–14)
APPEARANCE UR: CLEAR
AST SERPL W P-5'-P-CCNC: 34 U/L (ref 0–45)
BASOPHILS # BLD AUTO: 0.1 10E9/L (ref 0–0.2)
BASOPHILS NFR BLD AUTO: 0.7 %
BILIRUB SERPL-MCNC: 0.2 MG/DL (ref 0.2–1.3)
BILIRUB UR QL STRIP: NEGATIVE
BUN SERPL-MCNC: 15 MG/DL (ref 7–30)
CALCIUM SERPL-MCNC: 8.6 MG/DL (ref 8.5–10.1)
CHLORIDE SERPL-SCNC: 112 MMOL/L (ref 94–109)
CO2 SERPL-SCNC: 22 MMOL/L (ref 20–32)
COLOR UR AUTO: YELLOW
CREAT SERPL-MCNC: 0.77 MG/DL (ref 0.66–1.25)
DIFFERENTIAL METHOD BLD: ABNORMAL
EOSINOPHIL # BLD AUTO: 0.3 10E9/L (ref 0–0.7)
EOSINOPHIL NFR BLD AUTO: 2.8 %
ERYTHROCYTE [DISTWIDTH] IN BLOOD BY AUTOMATED COUNT: 13.6 % (ref 10–15)
GFR SERPL CREATININE-BSD FRML MDRD: >90 ML/MIN/{1.73_M2}
GLUCOSE BLDC GLUCOMTR-MCNC: 239 MG/DL (ref 70–99)
GLUCOSE SERPL-MCNC: 136 MG/DL (ref 70–99)
GLUCOSE UR STRIP-MCNC: NEGATIVE MG/DL
HCT VFR BLD AUTO: 38.4 % (ref 40–53)
HGB BLD-MCNC: 12.5 G/DL (ref 13.3–17.7)
HGB UR QL STRIP: NEGATIVE
IMM GRANULOCYTES # BLD: 0 10E9/L (ref 0–0.4)
IMM GRANULOCYTES NFR BLD: 0.3 %
KETONES UR STRIP-MCNC: 5 MG/DL
LEUKOCYTE ESTERASE UR QL STRIP: NEGATIVE
LYMPHOCYTES # BLD AUTO: 4.1 10E9/L (ref 0.8–5.3)
LYMPHOCYTES NFR BLD AUTO: 39.3 %
MCH RBC QN AUTO: 30.3 PG (ref 26.5–33)
MCHC RBC AUTO-ENTMCNC: 32.6 G/DL (ref 31.5–36.5)
MCV RBC AUTO: 93 FL (ref 78–100)
MONOCYTES # BLD AUTO: 1.3 10E9/L (ref 0–1.3)
MONOCYTES NFR BLD AUTO: 12.1 %
MUCOUS THREADS #/AREA URNS LPF: PRESENT /LPF
NEUTROPHILS # BLD AUTO: 4.7 10E9/L (ref 1.6–8.3)
NEUTROPHILS NFR BLD AUTO: 44.8 %
NITRATE UR QL: NEGATIVE
NRBC # BLD AUTO: 0 10*3/UL
NRBC BLD AUTO-RTO: 0 /100
PH UR STRIP: 6.5 PH (ref 5–7)
PLATELET # BLD AUTO: 357 10E9/L (ref 150–450)
POTASSIUM SERPL-SCNC: 4.3 MMOL/L (ref 3.4–5.3)
PROT SERPL-MCNC: 7.2 G/DL (ref 6.8–8.8)
RBC # BLD AUTO: 4.13 10E12/L (ref 4.4–5.9)
RBC #/AREA URNS AUTO: 1 /HPF (ref 0–2)
SODIUM SERPL-SCNC: 142 MMOL/L (ref 133–144)
SOURCE: ABNORMAL
SP GR UR STRIP: 1.03 (ref 1–1.03)
TSH SERPL DL<=0.005 MIU/L-ACNC: 0.76 MU/L (ref 0.4–4)
UROBILINOGEN UR STRIP-MCNC: 2 MG/DL (ref 0–2)
VALPROATE SERPL-MCNC: 51 MG/L (ref 50–100)
WBC # BLD AUTO: 10.4 10E9/L (ref 4–11)
WBC #/AREA URNS AUTO: 1 /HPF (ref 0–5)

## 2019-06-26 PROCEDURE — 80053 COMPREHEN METABOLIC PANEL: CPT | Performed by: FAMILY MEDICINE

## 2019-06-26 PROCEDURE — 81001 URINALYSIS AUTO W/SCOPE: CPT | Performed by: FAMILY MEDICINE

## 2019-06-26 PROCEDURE — 90791 PSYCH DIAGNOSTIC EVALUATION: CPT

## 2019-06-26 PROCEDURE — 99285 EMERGENCY DEPT VISIT HI MDM: CPT | Mod: 25 | Performed by: FAMILY MEDICINE

## 2019-06-26 PROCEDURE — 00000146 ZZHCL STATISTIC GLUCOSE BY METER IP

## 2019-06-26 PROCEDURE — 85025 COMPLETE CBC W/AUTO DIFF WBC: CPT | Performed by: FAMILY MEDICINE

## 2019-06-26 PROCEDURE — 12400001 ZZH R&B MH UMMC

## 2019-06-26 PROCEDURE — 80164 ASSAY DIPROPYLACETIC ACD TOT: CPT | Performed by: FAMILY MEDICINE

## 2019-06-26 PROCEDURE — 84443 ASSAY THYROID STIM HORMONE: CPT | Performed by: FAMILY MEDICINE

## 2019-06-26 PROCEDURE — 80159 DRUG ASSAY CLOZAPINE: CPT | Performed by: FAMILY MEDICINE

## 2019-06-26 PROCEDURE — 25000132 ZZH RX MED GY IP 250 OP 250 PS 637: Performed by: PSYCHIATRY & NEUROLOGY

## 2019-06-26 PROCEDURE — 99285 EMERGENCY DEPT VISIT HI MDM: CPT | Mod: Z6 | Performed by: FAMILY MEDICINE

## 2019-06-26 RX ORDER — OLANZAPINE 10 MG/2ML
10 INJECTION, POWDER, FOR SOLUTION INTRAMUSCULAR
Status: DISCONTINUED | OUTPATIENT
Start: 2019-06-26 | End: 2019-08-27 | Stop reason: HOSPADM

## 2019-06-26 RX ORDER — ARIPIPRAZOLE 10 MG/1
10 TABLET ORAL DAILY
Status: DISCONTINUED | OUTPATIENT
Start: 2019-06-27 | End: 2019-08-27 | Stop reason: HOSPADM

## 2019-06-26 RX ORDER — CHLORAL HYDRATE 500 MG
1 CAPSULE ORAL 2 TIMES DAILY
Status: DISCONTINUED | OUTPATIENT
Start: 2019-06-26 | End: 2019-08-27 | Stop reason: HOSPADM

## 2019-06-26 RX ORDER — ESCITALOPRAM OXALATE 20 MG/1
20 TABLET ORAL DAILY
Status: DISCONTINUED | OUTPATIENT
Start: 2019-06-27 | End: 2019-08-27 | Stop reason: HOSPADM

## 2019-06-26 RX ORDER — PROPRANOLOL HYDROCHLORIDE 80 MG/1
80 CAPSULE, EXTENDED RELEASE ORAL DAILY
Status: DISCONTINUED | OUTPATIENT
Start: 2019-06-27 | End: 2019-08-27 | Stop reason: HOSPADM

## 2019-06-26 RX ORDER — CLOZAPINE 100 MG/1
400 TABLET ORAL AT BEDTIME
Status: DISCONTINUED | OUTPATIENT
Start: 2019-06-26 | End: 2019-07-08

## 2019-06-26 RX ORDER — FENOFIBRATE 160 MG/1
160 TABLET ORAL EVERY EVENING
Status: DISCONTINUED | OUTPATIENT
Start: 2019-06-26 | End: 2019-08-27 | Stop reason: HOSPADM

## 2019-06-26 RX ORDER — CLOZAPINE 25 MG/1
50 TABLET ORAL AT BEDTIME
Status: DISCONTINUED | OUTPATIENT
Start: 2019-06-26 | End: 2019-07-08

## 2019-06-26 RX ORDER — HYDROXYZINE HYDROCHLORIDE 25 MG/1
25 TABLET, FILM COATED ORAL EVERY 4 HOURS PRN
Status: DISCONTINUED | OUTPATIENT
Start: 2019-06-26 | End: 2019-08-27 | Stop reason: HOSPADM

## 2019-06-26 RX ORDER — CLOZAPINE 100 MG/1
200 TABLET ORAL EVERY MORNING
Status: DISCONTINUED | OUTPATIENT
Start: 2019-06-27 | End: 2019-08-27 | Stop reason: HOSPADM

## 2019-06-26 RX ORDER — ACETAMINOPHEN 325 MG/1
650 TABLET ORAL EVERY 4 HOURS PRN
Status: DISCONTINUED | OUTPATIENT
Start: 2019-06-26 | End: 2019-08-27 | Stop reason: HOSPADM

## 2019-06-26 RX ORDER — MULTIVITAMIN,THERAPEUTIC
1 TABLET ORAL DAILY
Status: DISCONTINUED | OUTPATIENT
Start: 2019-06-27 | End: 2019-08-27 | Stop reason: HOSPADM

## 2019-06-26 RX ORDER — SIMVASTATIN 20 MG
40 TABLET ORAL AT BEDTIME
Status: DISCONTINUED | OUTPATIENT
Start: 2019-06-26 | End: 2019-08-27 | Stop reason: HOSPADM

## 2019-06-26 RX ORDER — OLANZAPINE 10 MG/1
10 TABLET ORAL
Status: DISCONTINUED | OUTPATIENT
Start: 2019-06-26 | End: 2019-08-27 | Stop reason: HOSPADM

## 2019-06-26 RX ORDER — NIACIN 500 MG/1
500 TABLET, EXTENDED RELEASE ORAL AT BEDTIME
Status: DISCONTINUED | OUTPATIENT
Start: 2019-06-26 | End: 2019-08-27 | Stop reason: HOSPADM

## 2019-06-26 RX ORDER — PANTOPRAZOLE SODIUM 20 MG/1
20 TABLET, DELAYED RELEASE ORAL DAILY
Status: DISCONTINUED | OUTPATIENT
Start: 2019-06-27 | End: 2019-08-27 | Stop reason: HOSPADM

## 2019-06-26 RX ORDER — POLYETHYLENE GLYCOL 3350 17 G/17G
17 POWDER, FOR SOLUTION ORAL DAILY
Status: DISCONTINUED | OUTPATIENT
Start: 2019-06-27 | End: 2019-08-27 | Stop reason: HOSPADM

## 2019-06-26 RX ORDER — DOCUSATE SODIUM 100 MG/1
100 CAPSULE, LIQUID FILLED ORAL 2 TIMES DAILY
Status: DISCONTINUED | OUTPATIENT
Start: 2019-06-26 | End: 2019-08-27 | Stop reason: HOSPADM

## 2019-06-26 RX ADMIN — CLOZAPINE 50 MG: 25 TABLET ORAL at 22:02

## 2019-06-26 RX ADMIN — DOCUSATE SODIUM 100 MG: 100 CAPSULE, LIQUID FILLED ORAL at 22:02

## 2019-06-26 RX ADMIN — CLOZAPINE 400 MG: 100 TABLET ORAL at 22:41

## 2019-06-26 RX ADMIN — NIACIN 500 MG: 500 TABLET, FILM COATED, EXTENDED RELEASE ORAL at 22:12

## 2019-06-26 RX ADMIN — SIMVASTATIN 40 MG: 20 TABLET, FILM COATED ORAL at 22:02

## 2019-06-26 RX ADMIN — Medication 1 G: at 22:12

## 2019-06-26 RX ADMIN — FENOFIBRATE 160 MG: 160 TABLET, FILM COATED ORAL at 22:12

## 2019-06-26 RX ADMIN — METFORMIN HYDROCHLORIDE 1000 MG: 500 TABLET, FILM COATED ORAL at 22:10

## 2019-06-26 ASSESSMENT — MIFFLIN-ST. JEOR: SCORE: 1851.44

## 2019-06-26 ASSESSMENT — ACTIVITIES OF DAILY LIVING (ADL)
COGNITION: 0 - NO COGNITION ISSUES REPORTED
RETIRED_EATING: 0-->INDEPENDENT
FALL_HISTORY_WITHIN_LAST_SIX_MONTHS: NO
RETIRED_COMMUNICATION: 0-->UNDERSTANDS/COMMUNICATES WITHOUT DIFFICULTY
SWALLOWING: 0-->SWALLOWS FOODS/LIQUIDS WITHOUT DIFFICULTY
BATHING: 0-->INDEPENDENT
DRESS: 0-->INDEPENDENT
AMBULATION: 0-->INDEPENDENT
TRANSFERRING: 0-->INDEPENDENT
TOILETING: 0-->INDEPENDENT

## 2019-06-26 NOTE — ED NOTES
Patient calm in room. Offered comfort measures, and advised him that a meal order would be given to him at that time.
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
## 2019-06-26 NOTE — PHARMACY-ADMISSION MEDICATION HISTORY
Admission medication history for the June 26, 2019 admission is complete.     Interview sources:  Patient's group home MAR    Reliability of source:  Good    Medication compliance: Good    Current Outpatient Pharmacy:  [facility]    Changes made to PTA medication list (reason)  Added: none  Deleted: none  Changed: none    Additional medication history information:   The medication was taken from the group home MAR. The patient was discharged from [facility] on 04/02/19 and continued on Depo Provera for hypersexuality. The Depo Provera was not on the group home MAR so unclear when he last received it. The progress note from the ER physician stated the group home reported the patient was not able to find outpatient provider to continue the Depo Provera.    Prior to Admission Medication List:  Prior to Admission medications    Medication Sig Last Dose Taking? Auth Provider   ARIPiprazole (ABILIFY) 10 MG tablet Take 1 tablet (10 mg) by mouth daily 6/26/2019 at AM Yes Ivone Solis MD   cloZAPine (CLOZARIL) 100 MG tablet Take 200 mg by mouth every morning  6/26/2019 at AM Yes Reported, Patient   cloZAPine (CLOZARIL) 100 MG tablet Take 400 mg by mouth At Bedtime  6/25/2019 at PM Yes Reported, Patient   cloZAPine (CLOZARIL) 25 MG tablet Take 50 mg by mouth At Bedtime 6/25/2019 at PM Yes Reported, Patient   divalproex sodium delayed-release (DEPAKOTE) 500 MG DR tablet Take 1 tablet (500 mg) by mouth daily AND 2 tablets (1,000 mg) At Bedtime. 6/26/2019 at AM Yes Ivone Solis MD   docusate sodium (COLACE) 100 MG tablet Take 100 mg by mouth 2 times daily  6/26/2019 at AM Yes Reported, Patient   escitalopram (LEXAPRO) 20 MG tablet Take 1 tablet (20 mg) by mouth daily 6/26/2019 at AM Yes Ivone Solis MD   fenofibrate (TRIGLIDE/LOFIBRA) 160 MG tablet Take 1 tablet (160 mg) by mouth daily 6/25/2019 at PM Yes Ivone Solis MD   fish oil-omega-3 fatty acids 1000 MG capsule Take 1 g by mouth 2 times daily   6/26/2019 at AM Yes Reported, Patient   melatonin 10 MG TABS tablet Take 1 tablet (10 mg) by mouth nightly as needed for sleep 6/25/2019 at PM Yes Cayetano Barfield MD   metFORMIN (GLUCOPHAGE) 1000 MG tablet Take 1 tablet (1,000 mg) by mouth 2 times daily (with meals) 6/26/2019 at AM Yes Ivone Solis MD   Multiple Vitamins-Minerals (CENTROVITE) TABS Take 1 tablet by mouth daily  6/26/2019 at AM Yes Reported, Patient   niacin ER (NIASPAN) 500 MG CR tablet Take 1 tablet (500 mg) by mouth At Bedtime 6/25/2019 at PM Yes Ivone Solis MD   pantoprazole (PROTONIX) 20 MG EC tablet Take 20 mg by mouth daily  6/26/2019 at AM Yes Reported, Patient   Podiatric Products (DIADERM FOOT REJUVENATING) CREA Externally apply topically daily 6/26/2019 at AM Yes Unknown, Entered By History   polyethylene glycol (MIRALAX/GLYCOLAX) packet Take 1 packet by mouth daily 6/26/2019 at AM Yes Reported, Patient   propranolol ER (INDERAL LA) 80 MG 24 hr capsule Take 1 capsule (80 mg) by mouth daily 6/26/2019 at AM Yes Ivone Solis MD   simvastatin (ZOCOR) 40 MG tablet Take 1 tablet (40 mg) by mouth At Bedtime 6/25/2019 at PM Yes Ivone Solis MD   Sodium Fluoride (SF 5000 PLUS) 1.1 % CREA Apply to affected area 2 times daily 6/26/2019 at AM Yes Unknown, Entered By History   vitamin D3 (CHOLECALCIFEROL) 2000 units tablet Take 2,000 Units by mouth daily  6/26/2019 at AM Yes Unknown, Entered By History   medroxyPROGESTERone (DEPO-PROVERA) 150 MG/ML IM injection Inject 1 mL (150 mg) into the muscle every 14 days Per report in ER note - patient not getting  Cayetano Barfield MD       Time spent: 30 minutes    Medication history completed by:   Berta Thompson, PharmD, BCPP  [facility] [place]  Available daily from 1-9 PM: phone [phone], ascom *[phone], pager [phone]

## 2019-06-26 NOTE — ED PROVIDER NOTES
History     Chief Complaint   Patient presents with     Aggressive Behavior     Pt has been having increasing aggressive behaviors at home, has been having auditory and hallucinations     HPI  Brayden Adrian is a 54 year old male who presents from his group home after having marked increase in his aggressive behaviors patient has a recent hospitalization here approximately 3 weeks ago patient had an acute change and has had increased aggressive and sexual behaviors to staff at the group home.  Patient has also had continued complex medical issues as well as his complex psychiatric issues.  Patient has been treated with Depo-Provera shots to help manage his sexual aggression and unfortunately has not been able to find a provider that continue managing those shots.  Patient's workers note that he has had some physical aggression in the past but has become more intense as of late and they are no longer able to maintain safety at the group home.    I have reviewed the Medications, Allergies, Past Medical and Surgical History, and Social History in the Epic system.    PERSONAL MEDICAL HISTORY  Past Medical History:   Diagnosis Date     Anxiety      Cognitive disorder      Depressive disorder      Diabetes mellitus type 2 in nonobese (H)      Gastritis      GERD (gastroesophageal reflux disease)      Hyperlipidemia      Hyperlipidemia      Hypertension      Paranoid schizophrenia (H)      Polysubstance abuse (H)      Tardive dyskinesia      PAST SURGICAL HISTORY  Past Surgical History:   Procedure Laterality Date     ENT SURGERY       FAMILY HISTORY  Family History   Problem Relation Age of Onset     Schizophrenia Paternal Aunt      SOCIAL HISTORY  Social History     Tobacco Use     Smoking status: Former Smoker     Smokeless tobacco: Former User   Substance Use Topics     Alcohol use: Not Currently     MEDICATIONS  No current facility-administered medications for this encounter.      Current Outpatient Medications    Medication     ARIPiprazole (ABILIFY) 10 MG tablet     cloZAPine (CLOZARIL) 100 MG tablet     cloZAPine (CLOZARIL) 100 MG tablet     cloZAPine (CLOZARIL) 25 MG tablet     divalproex sodium delayed-release (DEPAKOTE) 500 MG DR tablet     docusate sodium (COLACE) 100 MG tablet     escitalopram (LEXAPRO) 20 MG tablet     fenofibrate (TRIGLIDE/LOFIBRA) 160 MG tablet     fish oil-omega-3 fatty acids 1000 MG capsule     medroxyPROGESTERone (DEPO-PROVERA) 150 MG/ML IM injection     medroxyPROGESTERone (DEPO-PROVERA) 150 MG/ML IM injection     melatonin 10 MG TABS tablet     metFORMIN (GLUCOPHAGE) 1000 MG tablet     metFORMIN (GLUCOPHAGE) 1000 MG tablet     Multiple Vitamins-Minerals (CENTROVITE) TABS     niacin ER (NIASPAN) 500 MG CR tablet     niacin ER (NIASPAN) 500 MG CR tablet     pantoprazole (PROTONIX) 20 MG EC tablet     Podiatric Products (DIADERM FOOT REJUVENATING) CREA     polyethylene glycol (MIRALAX/GLYCOLAX) packet     propranolol ER (INDERAL LA) 80 MG 24 hr capsule     simvastatin (ZOCOR) 40 MG tablet     Sodium Fluoride (SF 5000 PLUS) 1.1 % CREA     vitamin D3 (CHOLECALCIFEROL) 2000 units tablet     ALLERGIES  Allergies   Allergen Reactions     Haldol [Haloperidol] Other (See Comments)     Increases pt's hallucinations          Review of Systems   Unable to perform ROS: Psychiatric disorder       Physical Exam   BP: 109/69  Pulse: 101  Temp: 99.6  F (37.6  C)  Resp: 16  Weight: 94.8 kg (209 lb)  SpO2: 97 %      Physical Exam   Constitutional: No distress.   HENT:   Head: Atraumatic.   Mouth/Throat: Oropharynx is clear and moist.   Eyes: Pupils are equal, round, and reactive to light. No scleral icterus.   Cardiovascular: Normal heart sounds and intact distal pulses.   Pulmonary/Chest: Breath sounds normal. No respiratory distress.   Abdominal: Soft. Bowel sounds are normal. There is no tenderness.   Musculoskeletal: He exhibits no edema or tenderness.   Neurological: He is alert. He exhibits normal muscle  tone. Coordination normal.   Skin: Skin is warm. No rash noted. He is not diaphoretic.   Psychiatric: His mood appears anxious. His affect is labile. His speech is rapid and/or pressured and tangential. He is agitated and aggressive. Cognition and memory are impaired. He expresses impulsivity.       ED Course        Procedures    Patient was seen and evaluated by the  please refer to the documentation in the note section of epic chart dated 6/26/2019    Critical Care time:  none              Assessments & Plan (with Medical Decision Making)       I have reviewed the nursing notes.    I have reviewed the findings, diagnosis, plan and need for follow up with the patient.  Patient with underlying paranoid schizophrenia living in a group home has a history of type 2 diabetes aggressive behavior and increasing sexual behavior patient is at significant risk of harming others at the group home and is not able to maintain safety.  Legal guardian was contacted and permission was given for admission to inpatient psychiatric unit.      Final diagnoses:   Paranoid schizophrenia (H)   Type 2 diabetes mellitus with complication, without long-term current use of insulin (H)   Aggressive behavior   Sexual behavior disorder       6/26/2019   [facility], EMERGENCY DEPARTMENT     Carter Gamez MD  06/26/19 0438

## 2019-06-27 LAB
GLUCOSE BLDC GLUCOMTR-MCNC: 163 MG/DL (ref 70–99)
GLUCOSE BLDC GLUCOMTR-MCNC: 259 MG/DL (ref 70–99)

## 2019-06-27 PROCEDURE — 12400001 ZZH R&B MH UMMC

## 2019-06-27 PROCEDURE — 00000146 ZZHCL STATISTIC GLUCOSE BY METER IP

## 2019-06-27 PROCEDURE — 25000132 ZZH RX MED GY IP 250 OP 250 PS 637: Performed by: PSYCHIATRY & NEUROLOGY

## 2019-06-27 PROCEDURE — 25000132 ZZH RX MED GY IP 250 OP 250 PS 637: Performed by: STUDENT IN AN ORGANIZED HEALTH CARE EDUCATION/TRAINING PROGRAM

## 2019-06-27 PROCEDURE — 99223 1ST HOSP IP/OBS HIGH 75: CPT | Mod: AI | Performed by: PSYCHIATRY & NEUROLOGY

## 2019-06-27 RX ORDER — DIVALPROEX SODIUM 500 MG/1
1000 TABLET, EXTENDED RELEASE ORAL AT BEDTIME
Status: DISCONTINUED | OUTPATIENT
Start: 2019-06-27 | End: 2019-07-01

## 2019-06-27 RX ORDER — DIVALPROEX SODIUM 500 MG/1
500 TABLET, EXTENDED RELEASE ORAL DAILY
Status: DISCONTINUED | OUTPATIENT
Start: 2019-06-28 | End: 2019-07-01

## 2019-06-27 RX ORDER — DIVALPROEX SODIUM 500 MG/1
500 TABLET, EXTENDED RELEASE ORAL ONCE
Status: COMPLETED | OUTPATIENT
Start: 2019-06-27 | End: 2019-06-27

## 2019-06-27 RX ADMIN — CLOZAPINE 50 MG: 25 TABLET ORAL at 19:44

## 2019-06-27 RX ADMIN — METFORMIN HYDROCHLORIDE 1000 MG: 500 TABLET, FILM COATED ORAL at 07:49

## 2019-06-27 RX ADMIN — Medication 1 G: at 19:45

## 2019-06-27 RX ADMIN — DIVALPROEX SODIUM 500 MG: 500 TABLET, EXTENDED RELEASE ORAL at 13:13

## 2019-06-27 RX ADMIN — METFORMIN HYDROCHLORIDE 1000 MG: 500 TABLET, FILM COATED ORAL at 17:43

## 2019-06-27 RX ADMIN — NIACIN 500 MG: 500 TABLET, FILM COATED, EXTENDED RELEASE ORAL at 19:44

## 2019-06-27 RX ADMIN — MELATONIN 2000 UNITS: at 07:50

## 2019-06-27 RX ADMIN — SIMVASTATIN 40 MG: 20 TABLET, FILM COATED ORAL at 19:44

## 2019-06-27 RX ADMIN — THERA TABS 1 TABLET: TAB at 07:49

## 2019-06-27 RX ADMIN — PANTOPRAZOLE SODIUM 20 MG: 20 TABLET, DELAYED RELEASE ORAL at 07:50

## 2019-06-27 RX ADMIN — Medication 1 G: at 07:49

## 2019-06-27 RX ADMIN — ESCITALOPRAM OXALATE 20 MG: 20 TABLET ORAL at 07:49

## 2019-06-27 RX ADMIN — CLOZAPINE 400 MG: 100 TABLET ORAL at 19:47

## 2019-06-27 RX ADMIN — POLYETHYLENE GLYCOL 3350 17 G: 17 POWDER, FOR SOLUTION ORAL at 07:50

## 2019-06-27 RX ADMIN — DOCUSATE SODIUM 100 MG: 100 CAPSULE, LIQUID FILLED ORAL at 07:49

## 2019-06-27 RX ADMIN — ARIPIPRAZOLE 10 MG: 10 TABLET ORAL at 07:49

## 2019-06-27 RX ADMIN — DOCUSATE SODIUM 100 MG: 100 CAPSULE, LIQUID FILLED ORAL at 19:45

## 2019-06-27 RX ADMIN — FENOFIBRATE 160 MG: 160 TABLET, FILM COATED ORAL at 19:44

## 2019-06-27 RX ADMIN — DIVALPROEX SODIUM 1000 MG: 500 TABLET, EXTENDED RELEASE ORAL at 19:45

## 2019-06-27 RX ADMIN — CLOZAPINE 200 MG: 100 TABLET ORAL at 07:49

## 2019-06-27 RX ADMIN — PROPRANOLOL HYDROCHLORIDE 80 MG: 80 CAPSULE, EXTENDED RELEASE ORAL at 07:50

## 2019-06-27 ASSESSMENT — ACTIVITIES OF DAILY LIVING (ADL)
HYGIENE/GROOMING: HANDWASHING;INDEPENDENT;SHOWER
ORAL_HYGIENE: INDEPENDENT
HYGIENE/GROOMING: INDEPENDENT
LAUNDRY: UNABLE TO COMPLETE
ORAL_HYGIENE: INDEPENDENT
LAUNDRY: UNABLE TO COMPLETE
DRESS: SCRUBS (BEHAVIORAL HEALTH)
DRESS: SCRUBS (BEHAVIORAL HEALTH);INDEPENDENT

## 2019-06-27 NOTE — PLAN OF CARE
"Patient remains on 1:1 male only SIO due to recent aggression and hypersexual behavior. Patient did have an occasion when in the milieu of lunging towards a male staff though was cooperative with verbal redirection back to room. Patient when asked later about the occurrence would only state \"Im sorry, it wont happen again.\" Patient isolative to room majority of evening blunted/flat in affect and very limited in social interactions. Patient during check in very short in most responses mainly yes or no with very little eye contact during interaction. Patient denied current AH/VH but did report seeing spirits in past experiences. Patient did report \"yes\" to having thoughts of suicide and responded yes to constantly having these thoughts but was unable to identify plan and stated no intent. Patient vitals appeared slightly tachycardic with some orthostatic rise. Patient notably restless and moving during vitals assessment needing encouragement from RN writer to steady himself by holding onto the nightstand in his room. Patient cooperative with AM glucose check which was elevated at 259, provider Dr. Gilmore aware and dietary orders were adjusted. Patient accepting of HS medications with no stated or observed side effects.  "

## 2019-06-27 NOTE — PROGRESS NOTES
06/26/19 2441   Patient Belongings   Did you bring any home meds/supplements to the hospital?  No   Patient Belongings locker   Patient Belongings Put in Hospital Secure Location (Security or Locker, etc.) clothing;shoes   Belongings Search Yes   Clothing Search Yes   Second Staff Alexys BENAVIDEZ    In Pt locker   One pair of shoes, one pair of pants, one shirt, one pair of underwear.   Sent to Security  Nothing  A               Admission:  I am responsible for any personal items that are not sent to the safe or pharmacy.  [facility] is not responsible for loss, theft or damage of any property in my possession.    Signature:  _________________________________ Date: _______  Time: _____                                              Staff Signature:  ____________________________ Date: ________  Time: _____      2nd Staff person, if patient is unable/unwilling to sign:    Signature: ________________________________ Date: ________  Time: _____     Discharge:  [facility] has returned all of my personal belongings:    Signature: _________________________________ Date: ________  Time: _____                                          Staff Signature:  ____________________________ Date: ________  Time: _____

## 2019-06-27 NOTE — PLAN OF CARE
Admission:     Admitted voluntarily to [facility] for increasing aggression and inappropriate sexual behavior at his group home. Father, Jose is legal guardian and provides consent for treatment via phone. Father also approves resuming all PTA medications. Appears pt was last inpatient at [facility] in April 2019. (See chart review for further details and hx)    Father/guardian would like to speak with doctor as soon as possible regarding Depo-Provera injection. Apparently pt has not been able to get this for an unknown period of time per group home, and they no longer have it listed on their MAR. Per ER note, they have been unable to find a provider to continue managing the injections.     Pt presents with full range affect, well groomed. Is calm, polite, and cooperative with interactions. He denies any suicidal ideation or thoughts to harm self and/or others. Oriented to room and unit, provided snacks. When asked to complete admission, pt states he does not want to answer any other questions right now and would like to go to sleep. Unable to complete admission per pt refusal and to provide for uninterrupted rest. See flowsheet for details.

## 2019-06-27 NOTE — ED NOTES
ED to Behavioral Floor Handoff    SITUATION  Brayden Adrian is a 54 year old male who speaks English and lives in a group home with others The patient arrived in the ED by ambulance from home with a complaint of Aggressive Behavior (Pt has been having increasing aggressive behaviors at home, has been having auditory and hallucinations)  .The patient's current symptoms started/worsened 1 month(s) ago and during this time the symptoms have increased.   In the ED, pt was diagnosed with   Final diagnoses:   Paranoid schizophrenia (H)   Type 2 diabetes mellitus with complication, without long-term current use of insulin (H)   Aggressive behavior   Sexual behavior disorder        Initial vitals were: BP: 109/69  Pulse: 101  Temp: 99.6  F (37.6  C)  Resp: 16  Weight: 94.8 kg (209 lb)  SpO2: 97 %   --------  Is the patient diabetic? Yes   If yes, last blood glucose? 136     If yes, was this treated in the ED? no  --------  Is the patient inebriated (ETOH) No or Impaired on other substances? No  MSSA done? N/A  Last MSSA score: --    Were withdrawal symptoms treated? N/A  Does the patient have a seizure history? No. If yes, date of most recent seizure--  --------  Is the patient patient experiencing suicidal ideation? denies current or recent suicidal ideation     Homicidal ideation? denies current or recent homicidal ideation or behaviors.    Self-injurious behavior/urges? denies current or recent self injurious behavior or ideation.  ------  Was pt aggressive in the ED No  Was a code called No  Is the pt now cooperative? Yes  -------  Meds given in ED: Medications - No data to display   Family present during ED course? No  Family currently present? No    BACKGROUND  Does the patient have a cognitive impairment or developmental disability? Yes  Allergies:   Allergies   Allergen Reactions     Haldol [Haloperidol] Other (See Comments)     Increases pt's hallucinations    .   Social demographics are   Social History      Socioeconomic History     Marital status: Single     Spouse name: None     Number of children: None     Years of education: None     Highest education level: None   Occupational History     None   Social Needs     Financial resource strain: None     Food insecurity:     Worry: None     Inability: None     Transportation needs:     Medical: None     Non-medical: None   Tobacco Use     Smoking status: Former Smoker     Smokeless tobacco: Former User   Substance and Sexual Activity     Alcohol use: Not Currently     Drug use: Not Currently     Sexual activity: None   Lifestyle     Physical activity:     Days per week: None     Minutes per session: None     Stress: None   Relationships     Social connections:     Talks on phone: None     Gets together: None     Attends Buddhism service: None     Active member of club or organization: None     Attends meetings of clubs or organizations: None     Relationship status: None     Intimate partner violence:     Fear of current or ex partner: None     Emotionally abused: None     Physically abused: None     Forced sexual activity: None   Other Topics Concern     None   Social History Narrative    Originally from [place] has 1 sibling raised by parents completed high school but had psychiatric illness that started at age 18.  Has lived in group homes since that time.  No marriage no children  service because guns weapons enjoys activities outdoors and sports.        ASSESSMENT  Labs results   Labs Ordered and Resulted from Time of ED Arrival Up to the Time of Departure from the ED   CBC WITH PLATELETS DIFFERENTIAL - Abnormal; Notable for the following components:       Result Value    RBC Count 4.13 (*)     Hemoglobin 12.5 (*)     Hematocrit 38.4 (*)     All other components within normal limits   COMPREHENSIVE METABOLIC PANEL - Abnormal; Notable for the following components:    Chloride 112 (*)     Glucose 136 (*)     All other components within normal limits    ROUTINE UA WITH MICROSCOPIC - Abnormal; Notable for the following components:    Ketones Urine 5 (*)     Protein Albumin Urine 10 (*)     Mucous Urine Present (*)     All other components within normal limits   TSH   VALPROIC ACID   CLOZAPINE AND METABOLITES QUANT      Imaging Studies: No results found for this or any previous visit (from the past 24 hour(s)).   Most recent vital signs /69   Pulse 101   Temp 99.6  F (37.6  C) (Oral)   Resp 16   Wt 94.8 kg (209 lb)   SpO2 97%    Abnormal labs/tests/findings requiring intervention:---   Pain control: pt had none  Nausea control: pt had none    RECOMMENDATION  Are any infection precautions needed (MRSA, VRE, etc.)? No If yes, what infection? --  ---  Does the patient have mobility issues? independently. If yes, what device does the pt use? ---  ---  Is patient on 72 hour hold or commitment? No If on 72 hour hold, have hold and rights been given to patient? N/A  Are admitting orders written if after 10 p.m. ?N/A  Tasks needing to be completed:---     Girma blakely--    [phone] [facility]   [phone] [facility] ED
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
## 2019-06-28 LAB
GLUCOSE BLDC GLUCOMTR-MCNC: 133 MG/DL (ref 70–99)
GLUCOSE BLDC GLUCOMTR-MCNC: 161 MG/DL (ref 70–99)
VALPROATE SERPL-MCNC: 49 MG/L (ref 50–100)

## 2019-06-28 PROCEDURE — 12400001 ZZH R&B MH UMMC

## 2019-06-28 PROCEDURE — 25000128 H RX IP 250 OP 636: Performed by: STUDENT IN AN ORGANIZED HEALTH CARE EDUCATION/TRAINING PROGRAM

## 2019-06-28 PROCEDURE — 25000132 ZZH RX MED GY IP 250 OP 250 PS 637: Performed by: STUDENT IN AN ORGANIZED HEALTH CARE EDUCATION/TRAINING PROGRAM

## 2019-06-28 PROCEDURE — 80159 DRUG ASSAY CLOZAPINE: CPT | Performed by: PSYCHIATRY & NEUROLOGY

## 2019-06-28 PROCEDURE — 80164 ASSAY DIPROPYLACETIC ACD TOT: CPT | Performed by: PSYCHIATRY & NEUROLOGY

## 2019-06-28 PROCEDURE — 25000132 ZZH RX MED GY IP 250 OP 250 PS 637: Performed by: PSYCHIATRY & NEUROLOGY

## 2019-06-28 PROCEDURE — 99232 SBSQ HOSP IP/OBS MODERATE 35: CPT | Mod: GC | Performed by: PSYCHIATRY & NEUROLOGY

## 2019-06-28 PROCEDURE — 36415 COLL VENOUS BLD VENIPUNCTURE: CPT | Performed by: PSYCHIATRY & NEUROLOGY

## 2019-06-28 PROCEDURE — 00000146 ZZHCL STATISTIC GLUCOSE BY METER IP

## 2019-06-28 RX ORDER — MEDROXYPROGESTERONE ACETATE 150 MG/ML
150 INJECTION, SUSPENSION INTRAMUSCULAR
Status: DISCONTINUED | OUTPATIENT
Start: 2019-06-28 | End: 2019-07-02

## 2019-06-28 RX ADMIN — DOCUSATE SODIUM 100 MG: 100 CAPSULE, LIQUID FILLED ORAL at 19:00

## 2019-06-28 RX ADMIN — FENOFIBRATE 160 MG: 160 TABLET, FILM COATED ORAL at 19:00

## 2019-06-28 RX ADMIN — MELATONIN 2000 UNITS: at 08:12

## 2019-06-28 RX ADMIN — MEDROXYPROGESTERONE ACETATE 150 MG: 150 INJECTION, SUSPENSION INTRAMUSCULAR at 16:42

## 2019-06-28 RX ADMIN — SIMVASTATIN 40 MG: 20 TABLET, FILM COATED ORAL at 19:00

## 2019-06-28 RX ADMIN — DIVALPROEX SODIUM 1000 MG: 500 TABLET, EXTENDED RELEASE ORAL at 19:00

## 2019-06-28 RX ADMIN — Medication 1 G: at 19:00

## 2019-06-28 RX ADMIN — CLOZAPINE 400 MG: 100 TABLET ORAL at 19:00

## 2019-06-28 RX ADMIN — METFORMIN HYDROCHLORIDE 1000 MG: 500 TABLET, FILM COATED ORAL at 17:44

## 2019-06-28 RX ADMIN — ESCITALOPRAM OXALATE 20 MG: 20 TABLET ORAL at 08:13

## 2019-06-28 RX ADMIN — DOCUSATE SODIUM 100 MG: 100 CAPSULE, LIQUID FILLED ORAL at 08:13

## 2019-06-28 RX ADMIN — THERA TABS 1 TABLET: TAB at 08:12

## 2019-06-28 RX ADMIN — PANTOPRAZOLE SODIUM 20 MG: 20 TABLET, DELAYED RELEASE ORAL at 08:13

## 2019-06-28 RX ADMIN — DIVALPROEX SODIUM 500 MG: 500 TABLET, EXTENDED RELEASE ORAL at 08:13

## 2019-06-28 RX ADMIN — POLYETHYLENE GLYCOL 3350 17 G: 17 POWDER, FOR SOLUTION ORAL at 08:12

## 2019-06-28 RX ADMIN — ARIPIPRAZOLE 10 MG: 10 TABLET ORAL at 08:13

## 2019-06-28 RX ADMIN — CLOZAPINE 200 MG: 100 TABLET ORAL at 08:12

## 2019-06-28 RX ADMIN — Medication 1 G: at 08:13

## 2019-06-28 RX ADMIN — PROPRANOLOL HYDROCHLORIDE 80 MG: 80 CAPSULE, EXTENDED RELEASE ORAL at 08:12

## 2019-06-28 RX ADMIN — CLOZAPINE 50 MG: 25 TABLET ORAL at 19:01

## 2019-06-28 RX ADMIN — METFORMIN HYDROCHLORIDE 1000 MG: 500 TABLET, FILM COATED ORAL at 08:12

## 2019-06-28 RX ADMIN — NIACIN 500 MG: 500 TABLET, FILM COATED, EXTENDED RELEASE ORAL at 19:00

## 2019-06-28 ASSESSMENT — ACTIVITIES OF DAILY LIVING (ADL)
ORAL_HYGIENE: PROMPTS
DRESS: SCRUBS (BEHAVIORAL HEALTH)
LAUNDRY: UNABLE TO COMPLETE
DRESS: SCRUBS (BEHAVIORAL HEALTH)
HYGIENE/GROOMING: INDEPENDENT
ORAL_HYGIENE: INDEPENDENT
HYGIENE/GROOMING: PROMPTS

## 2019-06-28 NOTE — PROGRESS NOTES
Attempted to call patient's father at number listed in chart. Left voicemail informing father of plan to restart of Depo injections and to continue other current medications.     Lukas Blackwell MD  PGY-2 Psychiatry Resident
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
## 2019-06-28 NOTE — PROGRESS NOTES
"Pt was noticeably restless and fidgety while getting morning vitals, as well as when the  was in his room for a blood draw.  Pt communicated to this writer that he was having urges to \"go after people\". Writer told him it would probably be best to stay away from peers and staff until these urges went away.  Pt was calm and cooperative this shift. Poor concentration, tangential speech when conversing.  "

## 2019-06-28 NOTE — PROGRESS NOTES
"Pt isolated to his room for majority of this shift. Pt was briefly out in the lounge during reflection time, but did not interact with staff or peers. Pt affect is blunted and flat, mood is calm, speech is limited to simple statements (e.g., \"yes\", \"no\"). Pt ate dinner and chose to remain in his room for that meal. There were no other behavioral concerns of note during this shift.     06/27/19 2144   Behavioral Health   Hallucinations denies / not responding to hallucinations   Thinking poor concentration;distractable   Orientation person: oriented;place: oriented   Memory baseline memory   Insight poor   Judgement impaired   Eye Contact staring;at examiner;into space   Affect blunted, flat   Mood mood is calm   Physical Appearance/Attire untidy   Hygiene other (see comment)  (adequate)   Suicidality other (see comments)  (denies)   1. Wish to be Dead No   2. Non-Specific Active Suicidal Thoughts  No   Self Injury other (see comment)  (none observed)   Elopement   (none observed)   Activity isolative;withdrawn   Speech clear;coherent   Medication Sensitivity no stated side effects;no observed side effects   Psychomotor / Gait balanced;steady   Psycho Education   Type of Intervention 1:1 intervention   Response participates with cues/redirection   Hours 0.5   Treatment Detail   (check-in)   Activities of Daily Living   Hygiene/Grooming independent   Oral Hygiene independent   Dress scrubs (behavioral health);independent   Laundry unable to complete   Room Organization independent     "

## 2019-06-29 LAB
CLOZAPINE AND METABOLITES TOTAL: 407 NG/ML
CLOZAPINE SERPL-MCNC: 251 NG/ML
CLOZAPINE-N-OXIDE QUANT: <100 NG/ML
GLUCOSE BLDC GLUCOMTR-MCNC: 137 MG/DL (ref 70–99)
GLUCOSE BLDC GLUCOMTR-MCNC: 146 MG/DL (ref 70–99)
NORCLOZAPINE SERPL-MCNC: 156 NG/ML

## 2019-06-29 PROCEDURE — 12400001 ZZH R&B MH UMMC

## 2019-06-29 PROCEDURE — 00000146 ZZHCL STATISTIC GLUCOSE BY METER IP

## 2019-06-29 PROCEDURE — 25000132 ZZH RX MED GY IP 250 OP 250 PS 637: Performed by: PSYCHIATRY & NEUROLOGY

## 2019-06-29 PROCEDURE — 25000132 ZZH RX MED GY IP 250 OP 250 PS 637: Performed by: STUDENT IN AN ORGANIZED HEALTH CARE EDUCATION/TRAINING PROGRAM

## 2019-06-29 RX ADMIN — ARIPIPRAZOLE 10 MG: 10 TABLET ORAL at 07:59

## 2019-06-29 RX ADMIN — DOCUSATE SODIUM 100 MG: 100 CAPSULE, LIQUID FILLED ORAL at 19:12

## 2019-06-29 RX ADMIN — DIVALPROEX SODIUM 500 MG: 500 TABLET, EXTENDED RELEASE ORAL at 07:59

## 2019-06-29 RX ADMIN — SIMVASTATIN 40 MG: 20 TABLET, FILM COATED ORAL at 19:12

## 2019-06-29 RX ADMIN — CLOZAPINE 200 MG: 100 TABLET ORAL at 07:58

## 2019-06-29 RX ADMIN — ESCITALOPRAM OXALATE 20 MG: 20 TABLET ORAL at 07:59

## 2019-06-29 RX ADMIN — HYDROXYZINE HYDROCHLORIDE 25 MG: 25 TABLET ORAL at 17:20

## 2019-06-29 RX ADMIN — CLOZAPINE 50 MG: 25 TABLET ORAL at 19:12

## 2019-06-29 RX ADMIN — DOCUSATE SODIUM 100 MG: 100 CAPSULE, LIQUID FILLED ORAL at 07:59

## 2019-06-29 RX ADMIN — MELATONIN 2000 UNITS: at 07:59

## 2019-06-29 RX ADMIN — PANTOPRAZOLE SODIUM 20 MG: 20 TABLET, DELAYED RELEASE ORAL at 07:59

## 2019-06-29 RX ADMIN — METFORMIN HYDROCHLORIDE 1000 MG: 500 TABLET, FILM COATED ORAL at 17:20

## 2019-06-29 RX ADMIN — Medication 1 G: at 07:58

## 2019-06-29 RX ADMIN — PROPRANOLOL HYDROCHLORIDE 80 MG: 80 CAPSULE, EXTENDED RELEASE ORAL at 07:58

## 2019-06-29 RX ADMIN — POLYETHYLENE GLYCOL 3350 17 G: 17 POWDER, FOR SOLUTION ORAL at 07:58

## 2019-06-29 RX ADMIN — FENOFIBRATE 160 MG: 160 TABLET, FILM COATED ORAL at 19:12

## 2019-06-29 RX ADMIN — THERA TABS 1 TABLET: TAB at 07:59

## 2019-06-29 RX ADMIN — NIACIN 500 MG: 500 TABLET, FILM COATED, EXTENDED RELEASE ORAL at 19:12

## 2019-06-29 RX ADMIN — METFORMIN HYDROCHLORIDE 1000 MG: 500 TABLET, FILM COATED ORAL at 07:59

## 2019-06-29 RX ADMIN — DIVALPROEX SODIUM 1000 MG: 500 TABLET, EXTENDED RELEASE ORAL at 19:13

## 2019-06-29 RX ADMIN — Medication 1 G: at 19:13

## 2019-06-29 RX ADMIN — CLOZAPINE 400 MG: 100 TABLET ORAL at 19:13

## 2019-06-29 ASSESSMENT — ACTIVITIES OF DAILY LIVING (ADL)
HYGIENE/GROOMING: PROMPTS
DRESS: SCRUBS (BEHAVIORAL HEALTH)
DRESS: SCRUBS (BEHAVIORAL HEALTH)
ORAL_HYGIENE: PROMPTS
ORAL_HYGIENE: PROMPTS
LAUNDRY: UNABLE TO COMPLETE
LAUNDRY: UNABLE TO COMPLETE
HYGIENE/GROOMING: PROMPTS
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
## 2019-06-30 LAB
CLOZAPINE AND METABOLITES TOTAL: 558 NG/ML
CLOZAPINE SERPL-MCNC: 354 NG/ML
CLOZAPINE-N-OXIDE QUANT: <100 NG/ML
GLUCOSE BLDC GLUCOMTR-MCNC: 116 MG/DL (ref 70–99)
GLUCOSE BLDC GLUCOMTR-MCNC: 129 MG/DL (ref 70–99)
NORCLOZAPINE SERPL-MCNC: 204 NG/ML

## 2019-06-30 PROCEDURE — 25000132 ZZH RX MED GY IP 250 OP 250 PS 637: Performed by: PSYCHIATRY & NEUROLOGY

## 2019-06-30 PROCEDURE — 12400001 ZZH R&B MH UMMC

## 2019-06-30 PROCEDURE — 25000132 ZZH RX MED GY IP 250 OP 250 PS 637: Performed by: STUDENT IN AN ORGANIZED HEALTH CARE EDUCATION/TRAINING PROGRAM

## 2019-06-30 PROCEDURE — 00000146 ZZHCL STATISTIC GLUCOSE BY METER IP

## 2019-06-30 RX ADMIN — DIVALPROEX SODIUM 500 MG: 500 TABLET, EXTENDED RELEASE ORAL at 08:06

## 2019-06-30 RX ADMIN — Medication 1 G: at 08:06

## 2019-06-30 RX ADMIN — METFORMIN HYDROCHLORIDE 1000 MG: 500 TABLET, FILM COATED ORAL at 08:06

## 2019-06-30 RX ADMIN — MELATONIN 2000 UNITS: at 08:05

## 2019-06-30 RX ADMIN — DOCUSATE SODIUM 100 MG: 100 CAPSULE, LIQUID FILLED ORAL at 19:33

## 2019-06-30 RX ADMIN — NIACIN 500 MG: 500 TABLET, FILM COATED, EXTENDED RELEASE ORAL at 19:33

## 2019-06-30 RX ADMIN — POLYETHYLENE GLYCOL 3350 17 G: 17 POWDER, FOR SOLUTION ORAL at 08:06

## 2019-06-30 RX ADMIN — CLOZAPINE 200 MG: 100 TABLET ORAL at 08:06

## 2019-06-30 RX ADMIN — ESCITALOPRAM OXALATE 20 MG: 20 TABLET ORAL at 08:06

## 2019-06-30 RX ADMIN — SIMVASTATIN 40 MG: 20 TABLET, FILM COATED ORAL at 19:33

## 2019-06-30 RX ADMIN — Medication 1 G: at 19:33

## 2019-06-30 RX ADMIN — CLOZAPINE 50 MG: 25 TABLET ORAL at 19:37

## 2019-06-30 RX ADMIN — THERA TABS 1 TABLET: TAB at 08:06

## 2019-06-30 RX ADMIN — ARIPIPRAZOLE 10 MG: 10 TABLET ORAL at 08:06

## 2019-06-30 RX ADMIN — CLOZAPINE 400 MG: 100 TABLET ORAL at 19:32

## 2019-06-30 RX ADMIN — DIVALPROEX SODIUM 1000 MG: 500 TABLET, EXTENDED RELEASE ORAL at 19:33

## 2019-06-30 RX ADMIN — PROPRANOLOL HYDROCHLORIDE 80 MG: 80 CAPSULE, EXTENDED RELEASE ORAL at 08:06

## 2019-06-30 RX ADMIN — FENOFIBRATE 160 MG: 160 TABLET, FILM COATED ORAL at 19:33

## 2019-06-30 RX ADMIN — METFORMIN HYDROCHLORIDE 1000 MG: 500 TABLET, FILM COATED ORAL at 17:58

## 2019-06-30 RX ADMIN — PANTOPRAZOLE SODIUM 20 MG: 20 TABLET, DELAYED RELEASE ORAL at 08:06

## 2019-06-30 RX ADMIN — DOCUSATE SODIUM 100 MG: 100 CAPSULE, LIQUID FILLED ORAL at 08:06

## 2019-06-30 ASSESSMENT — ACTIVITIES OF DAILY LIVING (ADL)
ORAL_HYGIENE: PROMPTS
LAUNDRY: UNABLE TO COMPLETE
HYGIENE/GROOMING: INDEPENDENT
DRESS: SCRUBS (BEHAVIORAL HEALTH)
ORAL_HYGIENE: INDEPENDENT
DRESS: SCRUBS (BEHAVIORAL HEALTH)
LAUNDRY: UNABLE TO COMPLETE
HYGIENE/GROOMING: PROMPTS

## 2019-06-30 NOTE — PLAN OF CARE
Problem: Behavior Regulation Impairment (Disruptive Behavior)  Goal: Improved Impulse and Aggression Control (Disruptive Behavior)  Outcome: Improving      Patient continues on SIO 1:1 male only with a 5 ft. space restriction, for hypersexual and aggressive behavior.  He was visible in the milieu.  He had one instance of impulsively  rushing  towards the general direction of a group of peers in the milieu, but then followed SIO staff redirection.  It was unclear if it was done in agitation.  Patient stated he was rushing to the waste bin.  Patient endorsed feeling  good .  He denied AH and VH.  Specifically denied seeing [place] and other Scientologist symbols.  He contracted for safety.  Denied thoughts, or hurting himself or others.      Patient was compliant with his AM medications.  He denied medication side effects and acute medical concerns.  He declined a shower and appeared disheveled.
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
## 2019-07-01 LAB
GLUCOSE BLDC GLUCOMTR-MCNC: 126 MG/DL (ref 70–99)
GLUCOSE BLDC GLUCOMTR-MCNC: 98 MG/DL (ref 70–99)
VALPROATE SERPL-MCNC: 39 MG/L (ref 50–100)

## 2019-07-01 PROCEDURE — 36415 COLL VENOUS BLD VENIPUNCTURE: CPT | Performed by: STUDENT IN AN ORGANIZED HEALTH CARE EDUCATION/TRAINING PROGRAM

## 2019-07-01 PROCEDURE — 25000132 ZZH RX MED GY IP 250 OP 250 PS 637: Performed by: PSYCHIATRY & NEUROLOGY

## 2019-07-01 PROCEDURE — 99232 SBSQ HOSP IP/OBS MODERATE 35: CPT | Mod: GC | Performed by: PSYCHIATRY & NEUROLOGY

## 2019-07-01 PROCEDURE — 00000146 ZZHCL STATISTIC GLUCOSE BY METER IP

## 2019-07-01 PROCEDURE — 12400001 ZZH R&B MH UMMC

## 2019-07-01 PROCEDURE — 25000132 ZZH RX MED GY IP 250 OP 250 PS 637: Performed by: STUDENT IN AN ORGANIZED HEALTH CARE EDUCATION/TRAINING PROGRAM

## 2019-07-01 PROCEDURE — 80164 ASSAY DIPROPYLACETIC ACD TOT: CPT | Performed by: STUDENT IN AN ORGANIZED HEALTH CARE EDUCATION/TRAINING PROGRAM

## 2019-07-01 RX ORDER — DIVALPROEX SODIUM 500 MG/1
1000 TABLET, EXTENDED RELEASE ORAL 2 TIMES DAILY
Status: DISCONTINUED | OUTPATIENT
Start: 2019-07-01 | End: 2019-07-01

## 2019-07-01 RX ORDER — DIVALPROEX SODIUM 500 MG/1
1000 TABLET, EXTENDED RELEASE ORAL 2 TIMES DAILY
Status: DISCONTINUED | OUTPATIENT
Start: 2019-07-01 | End: 2019-07-17

## 2019-07-01 RX ORDER — DIVALPROEX SODIUM 500 MG/1
1000 TABLET, EXTENDED RELEASE ORAL AT BEDTIME
Status: DISCONTINUED | OUTPATIENT
Start: 2019-07-01 | End: 2019-07-01

## 2019-07-01 RX ORDER — DIVALPROEX SODIUM 500 MG/1
500 TABLET, EXTENDED RELEASE ORAL DAILY
Status: DISCONTINUED | OUTPATIENT
Start: 2019-07-01 | End: 2019-07-01

## 2019-07-01 RX ADMIN — METFORMIN HYDROCHLORIDE 1000 MG: 500 TABLET, FILM COATED ORAL at 18:27

## 2019-07-01 RX ADMIN — Medication 1 G: at 08:42

## 2019-07-01 RX ADMIN — NIACIN 500 MG: 500 TABLET, FILM COATED, EXTENDED RELEASE ORAL at 19:12

## 2019-07-01 RX ADMIN — FENOFIBRATE 160 MG: 160 TABLET, FILM COATED ORAL at 19:12

## 2019-07-01 RX ADMIN — CLOZAPINE 200 MG: 100 TABLET ORAL at 08:42

## 2019-07-01 RX ADMIN — MELATONIN 2000 UNITS: at 08:42

## 2019-07-01 RX ADMIN — DOCUSATE SODIUM 100 MG: 100 CAPSULE, LIQUID FILLED ORAL at 08:42

## 2019-07-01 RX ADMIN — PROPRANOLOL HYDROCHLORIDE 80 MG: 80 CAPSULE, EXTENDED RELEASE ORAL at 08:42

## 2019-07-01 RX ADMIN — THERA TABS 1 TABLET: TAB at 08:42

## 2019-07-01 RX ADMIN — SIMVASTATIN 40 MG: 20 TABLET, FILM COATED ORAL at 19:12

## 2019-07-01 RX ADMIN — Medication 1 G: at 19:12

## 2019-07-01 RX ADMIN — OLANZAPINE 10 MG: 10 TABLET, FILM COATED ORAL at 16:34

## 2019-07-01 RX ADMIN — CLOZAPINE 400 MG: 100 TABLET ORAL at 19:12

## 2019-07-01 RX ADMIN — DIVALPROEX SODIUM 500 MG: 500 TABLET, EXTENDED RELEASE ORAL at 08:42

## 2019-07-01 RX ADMIN — PANTOPRAZOLE SODIUM 20 MG: 20 TABLET, DELAYED RELEASE ORAL at 08:42

## 2019-07-01 RX ADMIN — DIVALPROEX SODIUM 1000 MG: 500 TABLET, FILM COATED, EXTENDED RELEASE ORAL at 19:12

## 2019-07-01 RX ADMIN — ESCITALOPRAM OXALATE 20 MG: 20 TABLET ORAL at 08:42

## 2019-07-01 RX ADMIN — CLOZAPINE 50 MG: 25 TABLET ORAL at 19:19

## 2019-07-01 RX ADMIN — POLYETHYLENE GLYCOL 3350 17 G: 17 POWDER, FOR SOLUTION ORAL at 08:42

## 2019-07-01 RX ADMIN — DOCUSATE SODIUM 100 MG: 100 CAPSULE, LIQUID FILLED ORAL at 19:13

## 2019-07-01 RX ADMIN — ARIPIPRAZOLE 10 MG: 10 TABLET ORAL at 08:42

## 2019-07-01 RX ADMIN — METFORMIN HYDROCHLORIDE 1000 MG: 500 TABLET, FILM COATED ORAL at 08:42

## 2019-07-01 ASSESSMENT — ACTIVITIES OF DAILY LIVING (ADL)
LAUNDRY: UNABLE TO COMPLETE
HYGIENE/GROOMING: PROMPTS
ORAL_HYGIENE: PROMPTS
DRESS: SCRUBS (BEHAVIORAL HEALTH)
ORAL_HYGIENE: PROMPTS
LAUNDRY: UNABLE TO COMPLETE
DRESS: SCRUBS (BEHAVIORAL HEALTH)
HYGIENE/GROOMING: PROMPTS
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
## 2019-07-01 NOTE — PROVIDER NOTIFICATION
07/01/19 1636   Seclusion or Restraint Order   In Person Face to Face Assessment Conducted Yes-Eval of pt's immediate situation, reaction to intervention, complete review of systems assessment, behavioral assessment & review/assessment of hx, drugs & meds, recent labs, etc, behavioral condition, need to continue/terminate restraint/seclusion   Patient Experienced No adverse physical outcome from seclusion/restraint initiation   Continuation of Seclus/Restraint indicated at this time No   Describe actions taken physical hold discontinued per RN and MD   Physical hold completed per [facility] policies. Pt does not appear to have any injuries and is denying any pain. Pt agreed to safe behaviors and physical hold was discontinued.
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
## 2019-07-01 NOTE — PROGRESS NOTES
Pt was mostly in his room, but was able to come out and sat in the lounge and watched a movie with peers. Pt seemed withdrawn and blunted in affect  upon presentation. Pt has a poor hygiene and may use prompts to shower. Pt ate dinner and went to bed early. Nothing else to add.        06/30/19 2915   Behavioral Health   Hallucinations denies / not responding to hallucinations   Thinking poor concentration   Orientation person: oriented;place: oriented;date: oriented;time: oriented;situation, disoriented   Memory baseline memory   Insight poor   Judgement impaired   Eye Contact at examiner   Affect full range affect   Mood mood is calm   Physical Appearance/Attire attire appropriate to age and situation   Hygiene neglected grooming - unclean body, hair, teeth   Suicidality other (see comments)  (No indications)   1. Wish to be Dead No   2. Non-Specific Active Suicidal Thoughts  No   Self Injury other (see comment)  (No indications)   Elopement   (None observed)   Activity withdrawn   Speech clear;coherent   Medication Sensitivity no stated side effects;no observed side effects   Psychomotor / Gait balanced;steady   Activities of Daily Living   Hygiene/Grooming independent   Oral Hygiene independent   Dress scrubs (behavioral health)   Laundry unable to complete   Room Organization independent   Activity   Activity Assistance Provided independent
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
## 2019-07-02 LAB
GLUCOSE BLDC GLUCOMTR-MCNC: 135 MG/DL (ref 70–99)
GLUCOSE BLDC GLUCOMTR-MCNC: 139 MG/DL (ref 70–99)

## 2019-07-02 PROCEDURE — 00000146 ZZHCL STATISTIC GLUCOSE BY METER IP

## 2019-07-02 PROCEDURE — 12400001 ZZH R&B MH UMMC

## 2019-07-02 PROCEDURE — 99232 SBSQ HOSP IP/OBS MODERATE 35: CPT | Mod: GC | Performed by: PSYCHIATRY & NEUROLOGY

## 2019-07-02 PROCEDURE — 25000132 ZZH RX MED GY IP 250 OP 250 PS 637: Performed by: STUDENT IN AN ORGANIZED HEALTH CARE EDUCATION/TRAINING PROGRAM

## 2019-07-02 PROCEDURE — 25000132 ZZH RX MED GY IP 250 OP 250 PS 637: Performed by: PSYCHIATRY & NEUROLOGY

## 2019-07-02 RX ORDER — MEDROXYPROGESTERONE ACETATE 150 MG/ML
300 INJECTION, SUSPENSION INTRAMUSCULAR
Status: DISCONTINUED | OUTPATIENT
Start: 2019-07-05 | End: 2019-08-27 | Stop reason: HOSPADM

## 2019-07-02 RX ADMIN — ESCITALOPRAM OXALATE 20 MG: 20 TABLET ORAL at 09:17

## 2019-07-02 RX ADMIN — METFORMIN HYDROCHLORIDE 1000 MG: 500 TABLET, FILM COATED ORAL at 17:59

## 2019-07-02 RX ADMIN — MELATONIN 2000 UNITS: at 09:16

## 2019-07-02 RX ADMIN — NIACIN 500 MG: 500 TABLET, FILM COATED, EXTENDED RELEASE ORAL at 20:51

## 2019-07-02 RX ADMIN — PROPRANOLOL HYDROCHLORIDE 80 MG: 80 CAPSULE, EXTENDED RELEASE ORAL at 09:16

## 2019-07-02 RX ADMIN — DOCUSATE SODIUM 100 MG: 100 CAPSULE, LIQUID FILLED ORAL at 20:52

## 2019-07-02 RX ADMIN — DIVALPROEX SODIUM 1000 MG: 500 TABLET, FILM COATED, EXTENDED RELEASE ORAL at 20:52

## 2019-07-02 RX ADMIN — CLOZAPINE 200 MG: 100 TABLET ORAL at 09:17

## 2019-07-02 RX ADMIN — DIVALPROEX SODIUM 1000 MG: 500 TABLET, FILM COATED, EXTENDED RELEASE ORAL at 09:17

## 2019-07-02 RX ADMIN — DOCUSATE SODIUM 100 MG: 100 CAPSULE, LIQUID FILLED ORAL at 09:17

## 2019-07-02 RX ADMIN — POLYETHYLENE GLYCOL 3350 17 G: 17 POWDER, FOR SOLUTION ORAL at 09:17

## 2019-07-02 RX ADMIN — ARIPIPRAZOLE 10 MG: 10 TABLET ORAL at 09:17

## 2019-07-02 RX ADMIN — Medication 1 G: at 20:51

## 2019-07-02 RX ADMIN — THERA TABS 1 TABLET: TAB at 09:17

## 2019-07-02 RX ADMIN — Medication 1 G: at 09:17

## 2019-07-02 RX ADMIN — SIMVASTATIN 40 MG: 20 TABLET, FILM COATED ORAL at 20:51

## 2019-07-02 RX ADMIN — CLOZAPINE 50 MG: 25 TABLET ORAL at 20:50

## 2019-07-02 RX ADMIN — METFORMIN HYDROCHLORIDE 1000 MG: 500 TABLET, FILM COATED ORAL at 09:17

## 2019-07-02 RX ADMIN — PANTOPRAZOLE SODIUM 20 MG: 20 TABLET, DELAYED RELEASE ORAL at 09:17

## 2019-07-02 RX ADMIN — FENOFIBRATE 160 MG: 160 TABLET, FILM COATED ORAL at 20:52

## 2019-07-02 RX ADMIN — CLOZAPINE 400 MG: 100 TABLET ORAL at 20:51

## 2019-07-02 ASSESSMENT — ACTIVITIES OF DAILY LIVING (ADL)
DRESS: SCRUBS (BEHAVIORAL HEALTH);INDEPENDENT
HYGIENE/GROOMING: PROMPTS
LAUNDRY: UNABLE TO COMPLETE
LAUNDRY: UNABLE TO COMPLETE
ORAL_HYGIENE: PROMPTS
HYGIENE/GROOMING: PROMPTS
DRESS: SCRUBS (BEHAVIORAL HEALTH)
ORAL_HYGIENE: INDEPENDENT
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
## 2019-07-02 NOTE — PROGRESS NOTES
Patient came out to the lounge for 2 hours this morning without incident. He has made no aggressive moments or inappropriate sexual comments.     07/02/19 1338   Sleep/Rest/Relaxation   Day/Evening Time Hours up all shift;resting in bed   Number of hours resting in bed 4 hours   Behavioral Health   Hallucinations denies / not responding to hallucinations   Thinking poor concentration   Orientation person: oriented;date: oriented   Memory baseline memory   Insight poor   Judgement impaired   Eye Contact at examiner;out of corner of eyes   Affect blunted, flat   Mood mood is calm   Physical Appearance/Attire disheveled;untidy   Hygiene neglected grooming - unclean body, hair, teeth   Suicidality other (see comments)  (Denies)   1. Wish to be Dead No   2. Non-Specific Active Suicidal Thoughts  No   Self Injury other (see comment)  (Nothing observed)   Elopement   (No behaviors)   Activity isolative;withdrawn;restless;other (see comment)  (per careplan)   Speech clear;other (see comments)  (Slow, some latency, odd comments)   Medication Sensitivity tardive   Psychomotor / Gait balanced;steady   Substance Withdrawal Interventions   Social and Therapeutic Interventions (Substance Withdrawal) encourage effective boundaries with peers   Overt Aggression Scale   Verbal Aggression 0   Aggression against Property 0   Auto-Aggression 0   Physical Aggression 0   Overt Aggression Total Score 0   Psycho Education   Type of Intervention 1:1 intervention   Response participates with cues/redirection   Hours 0.5   Treatment Detail Coping  (check in)   Daily Care   Activity up ad joyce   Activities of Daily Living   Hygiene/Grooming prompts   Oral Hygiene independent   Dress scrubs (behavioral health)   Laundry unable to complete   Room Organization independent   Activity   Activity Assistance Provided independent

## 2019-07-03 LAB
BASOPHILS # BLD AUTO: 0.1 10E9/L (ref 0–0.2)
BASOPHILS NFR BLD AUTO: 0.6 %
DIFFERENTIAL METHOD BLD: NORMAL
EOSINOPHIL # BLD AUTO: 0.3 10E9/L (ref 0–0.7)
EOSINOPHIL NFR BLD AUTO: 3 %
GLUCOSE BLDC GLUCOMTR-MCNC: 107 MG/DL (ref 70–99)
GLUCOSE BLDC GLUCOMTR-MCNC: 185 MG/DL (ref 70–99)
IMM GRANULOCYTES # BLD: 0.1 10E9/L (ref 0–0.4)
IMM GRANULOCYTES NFR BLD: 0.5 %
LYMPHOCYTES # BLD AUTO: 3.9 10E9/L (ref 0.8–5.3)
LYMPHOCYTES NFR BLD AUTO: 41.3 %
MONOCYTES # BLD AUTO: 0.8 10E9/L (ref 0–1.3)
MONOCYTES NFR BLD AUTO: 8.4 %
NEUTROPHILS # BLD AUTO: 4.3 10E9/L (ref 1.6–8.3)
NEUTROPHILS NFR BLD AUTO: 46.2 %
NRBC # BLD AUTO: 0 10*3/UL
NRBC BLD AUTO-RTO: 0 /100
WBC # BLD AUTO: 9.4 10E9/L (ref 4–11)

## 2019-07-03 PROCEDURE — 12400001 ZZH R&B MH UMMC

## 2019-07-03 PROCEDURE — 25000132 ZZH RX MED GY IP 250 OP 250 PS 637: Performed by: STUDENT IN AN ORGANIZED HEALTH CARE EDUCATION/TRAINING PROGRAM

## 2019-07-03 PROCEDURE — 25000132 ZZH RX MED GY IP 250 OP 250 PS 637: Performed by: PSYCHIATRY & NEUROLOGY

## 2019-07-03 PROCEDURE — 36415 COLL VENOUS BLD VENIPUNCTURE: CPT | Performed by: PSYCHIATRY & NEUROLOGY

## 2019-07-03 PROCEDURE — 00000146 ZZHCL STATISTIC GLUCOSE BY METER IP

## 2019-07-03 PROCEDURE — 85004 AUTOMATED DIFF WBC COUNT: CPT | Performed by: PSYCHIATRY & NEUROLOGY

## 2019-07-03 PROCEDURE — 85048 AUTOMATED LEUKOCYTE COUNT: CPT | Performed by: PSYCHIATRY & NEUROLOGY

## 2019-07-03 PROCEDURE — 99233 SBSQ HOSP IP/OBS HIGH 50: CPT | Mod: GC | Performed by: PSYCHIATRY & NEUROLOGY

## 2019-07-03 RX ADMIN — THERA TABS 1 TABLET: TAB at 09:33

## 2019-07-03 RX ADMIN — METFORMIN HYDROCHLORIDE 1000 MG: 500 TABLET, FILM COATED ORAL at 19:01

## 2019-07-03 RX ADMIN — CLOZAPINE 50 MG: 25 TABLET ORAL at 19:01

## 2019-07-03 RX ADMIN — POLYETHYLENE GLYCOL 3350 17 G: 17 POWDER, FOR SOLUTION ORAL at 09:33

## 2019-07-03 RX ADMIN — Medication 1 G: at 09:33

## 2019-07-03 RX ADMIN — Medication 1 G: at 19:01

## 2019-07-03 RX ADMIN — PANTOPRAZOLE SODIUM 20 MG: 20 TABLET, DELAYED RELEASE ORAL at 09:34

## 2019-07-03 RX ADMIN — CLOZAPINE 400 MG: 100 TABLET ORAL at 19:04

## 2019-07-03 RX ADMIN — ARIPIPRAZOLE 10 MG: 10 TABLET ORAL at 09:33

## 2019-07-03 RX ADMIN — CLOZAPINE 200 MG: 100 TABLET ORAL at 09:33

## 2019-07-03 RX ADMIN — NIACIN 500 MG: 500 TABLET, FILM COATED, EXTENDED RELEASE ORAL at 19:01

## 2019-07-03 RX ADMIN — ESCITALOPRAM OXALATE 20 MG: 20 TABLET ORAL at 09:33

## 2019-07-03 RX ADMIN — PROPRANOLOL HYDROCHLORIDE 80 MG: 80 CAPSULE, EXTENDED RELEASE ORAL at 09:32

## 2019-07-03 RX ADMIN — MELATONIN 2000 UNITS: at 09:32

## 2019-07-03 RX ADMIN — DOCUSATE SODIUM 100 MG: 100 CAPSULE, LIQUID FILLED ORAL at 19:01

## 2019-07-03 RX ADMIN — METFORMIN HYDROCHLORIDE 1000 MG: 500 TABLET, FILM COATED ORAL at 09:32

## 2019-07-03 RX ADMIN — DOCUSATE SODIUM 100 MG: 100 CAPSULE, LIQUID FILLED ORAL at 09:32

## 2019-07-03 RX ADMIN — DIVALPROEX SODIUM 1000 MG: 500 TABLET, FILM COATED, EXTENDED RELEASE ORAL at 09:33

## 2019-07-03 RX ADMIN — DIVALPROEX SODIUM 1000 MG: 500 TABLET, FILM COATED, EXTENDED RELEASE ORAL at 19:01

## 2019-07-03 RX ADMIN — SIMVASTATIN 40 MG: 20 TABLET, FILM COATED ORAL at 19:01

## 2019-07-03 RX ADMIN — FENOFIBRATE 160 MG: 160 TABLET, FILM COATED ORAL at 19:01

## 2019-07-03 ASSESSMENT — ACTIVITIES OF DAILY LIVING (ADL)
HYGIENE/GROOMING: PROMPTS
HYGIENE/GROOMING: PROMPTS
DRESS: SCRUBS (BEHAVIORAL HEALTH)
ORAL_HYGIENE: PROMPTS
LAUNDRY: UNABLE TO COMPLETE
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
## 2019-07-03 NOTE — PLAN OF CARE
"Patient remains on male only 1:1 SIO related to aggressive behaviors and hypersexuality towards staff and peers. None of these behaviors noted this shift. Patient isolative to room majority of the shift. Patient upon approach appears blunted and flat at times tense during assessments and medication administration. Patient stated overall mood as good indicating watching television as the reason for his mood. Patient denied SI/SIB, but denied AH/VH but reported \"listening to my inner spirits\". Patient denied thoughts of harming others but than reported \"Didn't think I would make it to my room earlier, thought someone might get me for not brushing my teeth.\" this comment after patient was prompted for ADL's. Patient ADL's appeared poor with no response to staff prompting throughout the shift. Patient accepting of HS medications with no stated or observed side effects.  "

## 2019-07-04 LAB
GLUCOSE BLDC GLUCOMTR-MCNC: 108 MG/DL (ref 70–99)
GLUCOSE BLDC GLUCOMTR-MCNC: 125 MG/DL (ref 70–99)

## 2019-07-04 PROCEDURE — 12400001 ZZH R&B MH UMMC

## 2019-07-04 PROCEDURE — 00000146 ZZHCL STATISTIC GLUCOSE BY METER IP

## 2019-07-04 PROCEDURE — 99232 SBSQ HOSP IP/OBS MODERATE 35: CPT | Performed by: PSYCHIATRY & NEUROLOGY

## 2019-07-04 PROCEDURE — 25000132 ZZH RX MED GY IP 250 OP 250 PS 637: Performed by: PSYCHIATRY & NEUROLOGY

## 2019-07-04 PROCEDURE — 25000132 ZZH RX MED GY IP 250 OP 250 PS 637: Performed by: STUDENT IN AN ORGANIZED HEALTH CARE EDUCATION/TRAINING PROGRAM

## 2019-07-04 RX ADMIN — Medication 1 G: at 19:44

## 2019-07-04 RX ADMIN — DIVALPROEX SODIUM 1000 MG: 500 TABLET, FILM COATED, EXTENDED RELEASE ORAL at 19:44

## 2019-07-04 RX ADMIN — CLOZAPINE 400 MG: 100 TABLET ORAL at 19:43

## 2019-07-04 RX ADMIN — SIMVASTATIN 40 MG: 20 TABLET, FILM COATED ORAL at 19:44

## 2019-07-04 RX ADMIN — DOCUSATE SODIUM 100 MG: 100 CAPSULE, LIQUID FILLED ORAL at 09:00

## 2019-07-04 RX ADMIN — PROPRANOLOL HYDROCHLORIDE 80 MG: 80 CAPSULE, EXTENDED RELEASE ORAL at 08:59

## 2019-07-04 RX ADMIN — DOCUSATE SODIUM 100 MG: 100 CAPSULE, LIQUID FILLED ORAL at 19:44

## 2019-07-04 RX ADMIN — POLYETHYLENE GLYCOL 3350 17 G: 17 POWDER, FOR SOLUTION ORAL at 09:00

## 2019-07-04 RX ADMIN — DIVALPROEX SODIUM 1000 MG: 500 TABLET, FILM COATED, EXTENDED RELEASE ORAL at 08:59

## 2019-07-04 RX ADMIN — Medication 1 G: at 08:59

## 2019-07-04 RX ADMIN — METFORMIN HYDROCHLORIDE 1000 MG: 500 TABLET, FILM COATED ORAL at 08:59

## 2019-07-04 RX ADMIN — FENOFIBRATE 160 MG: 160 TABLET, FILM COATED ORAL at 19:44

## 2019-07-04 RX ADMIN — CLOZAPINE 50 MG: 25 TABLET ORAL at 19:44

## 2019-07-04 RX ADMIN — NIACIN 500 MG: 500 TABLET, FILM COATED, EXTENDED RELEASE ORAL at 19:44

## 2019-07-04 RX ADMIN — ESCITALOPRAM OXALATE 20 MG: 20 TABLET ORAL at 08:59

## 2019-07-04 RX ADMIN — ARIPIPRAZOLE 10 MG: 10 TABLET ORAL at 08:59

## 2019-07-04 RX ADMIN — PANTOPRAZOLE SODIUM 20 MG: 20 TABLET, DELAYED RELEASE ORAL at 09:00

## 2019-07-04 RX ADMIN — CLOZAPINE 200 MG: 100 TABLET ORAL at 08:59

## 2019-07-04 RX ADMIN — METFORMIN HYDROCHLORIDE 1000 MG: 500 TABLET, FILM COATED ORAL at 17:53

## 2019-07-04 RX ADMIN — MELATONIN 2000 UNITS: at 09:00

## 2019-07-04 RX ADMIN — THERA TABS 1 TABLET: TAB at 08:59

## 2019-07-04 ASSESSMENT — ACTIVITIES OF DAILY LIVING (ADL)
HYGIENE/GROOMING: PROMPTS;INDEPENDENT
DRESS: SCRUBS (BEHAVIORAL HEALTH);INDEPENDENT
ORAL_HYGIENE: INDEPENDENT;PROMPTS
ORAL_HYGIENE: PROMPTS;INDEPENDENT
DRESS: INDEPENDENT;PROMPTS
HYGIENE/GROOMING: INDEPENDENT;PROMPTS
LAUNDRY: UNABLE TO COMPLETE
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
## 2019-07-04 NOTE — PLAN OF CARE
"  Pt presents with distractible thinking and poor concentration. Eye contact seen to be out of the corner of Pt's eyes and at examiner. Affect blunted. Appearance disheveled. Pt neglecting personal hygiene. Pt refuses to take a shower or perform ADLs upon prompting. Activity isolative and withdrawn. Pt seen to spend majority of time in his bed watching TV. Pt spent approximately 10-15 minutes in lounge two.     In reference to appetite, Pt states \"I'm real hungry.\" When asked about sleep hygiene/how Pt slept last night, Pt states \"unconscious.\" Pt denies any acute physical ailments. Pt had reported a headache with 3/10 pain at AM vital sign assessment; however, Pt has denied headache to writer multiple times. Pt's BP seen to be to be low upon AM vital sign assessment (BP: 100/78). Pt asymptomatic. Pt's Pulse seen to be elevated upon AM vital sign assessment (P: 114). Pt asymptomatic. Attending provider notified of BP and P.      When asked to rate depression on a low-medium-high scale, Pt states \"I'm depressed.\" Pt unable to comment on/rate his anxiety level. Pt seen to have calm mood. Pt denies SI/SIB/HI.    When asked if Pt is experiencing AH, Pt states \"I hear the people talking in the hallway.\" When asked if Pt is experiencing VH, Pt states \"I had a vision that someone wanted me to rape someone. I prayed to God cause I don't want to do that.\" Pt has not made any other comments/gestures surrounding sexual content. Pt has not made any physical gestures (grabbing/groping) towards staff/Pts this shift.    Pt also states \"I'm having schizophrenia symptoms.\"     Pt's BG taken at 11:39 due to Pt eating breakfast before BG was obtained. Pt's 11:39 B.   "

## 2019-07-04 NOTE — PROGRESS NOTES
Pt declined to shower and come out of his room this shift despite prompting and encouragement. He was encouraged and accepting of bath wipes to attend to basic hygiene. Will continue to monitor and encourage completion of ADLs and participation in therapeutic milieu when able.

## 2019-07-04 NOTE — PROGRESS NOTES
Pt presents blunted/tense and anxious mood. Laying on bed staring at the wall early half of shift. Watched tv later half of shift, however staring at the wall much of the time and not tv. Appears distracted and responding. Declined shower. Ate meals.       07/03/19 1900   Behavioral Health   Hallucinations appears responding   Thinking distractable   Orientation person: oriented;place: oriented   Insight poor   Judgement impaired   Affect blunted, flat;tense   Mood anxious   Physical Appearance/Attire disheveled;untidy   Hygiene neglected grooming - unclean body, hair, teeth   1. Wish to be Dead No   2. Non-Specific Active Suicidal Thoughts  No   Activity withdrawn;isolative;restless   Speech pressured;other (see comments)  (Short, rapid)   Psychomotor / Gait balanced;steady   Activities of Daily Living   Hygiene/Grooming prompts  (Declined all)

## 2019-07-04 NOTE — PROGRESS NOTES
[facility], [place]   Psychiatric Progress Note  Brayden Adrian  [id number]  07/04/19    Chief Complaint: Continued medical care          Interim History:   The patient's care was discussed with the treatment team during the daily team meeting and/or staff's chart notes were reviewed.  Staff report patient had one elevation in temperature that seem to resolve had elevated blood sugar 185 seems distracted no group activities and seems to have auditory hallucinations and mentions a headache.  He slept about 4 hours.    Upon visiting with him he says that he is okay he mentions a prize on his spirits and that he had dirty looks from others.  He says that this is in his imagination though.  He would like to be the baddest dude that ever lived.  Mentions possibly being rich in wanting $1 million in [employer].  Mentions staff members and other people.  Can have thought grabbing people and sexually inappropriate thoughts.  Denies any thoughts of harming himself or others or any hopelessness or helplessness enjoys eating the food here in the hospital does not have any appetite issues.  Denies anxiety and has deficits in attention and concentration and has disorganized thoughts.         Medications:       ARIPiprazole  10 mg Oral Daily     cloZAPine  200 mg Oral QAM     cloZAPine  400 mg Oral At Bedtime     cloZAPine  50 mg Oral At Bedtime     DIADERM FOOT REJUVENATING   Apply externally Daily     divalproex sodium extended-release  1,000 mg Oral BID     docusate sodium  100 mg Oral BID     escitalopram  20 mg Oral Daily     fenofibrate  160 mg Oral QPM     fish oil-omega-3 fatty acids  1 g Oral BID     [START ON 7/5/2019] medroxyPROGESTERone  300 mg Intramuscular Q7 Days     metFORMIN  1,000 mg Oral BID w/meals     multivitamin, therapeutic  1 tablet Oral Daily     niacin ER  500 mg Oral At Bedtime     pantoprazole  20 mg Oral Daily     polyethylene glycol  17 g Oral Daily     propranolol ER  80 mg  "Oral Daily     simvastatin  40 mg Oral At Bedtime     vitamin D3  2,000 Units Oral Daily          Allergies:     Allergies   Allergen Reactions     Haldol [Haloperidol] Other (See Comments)     Increases pt's hallucinations           Labs:     Recent Results (from the past 24 hour(s))   Glucose by meter    Collection Time: 07/03/19  4:55 PM   Result Value Ref Range    Glucose 185 (H) 70 - 99 mg/dL   Glucose by meter    Collection Time: 07/04/19 11:39 AM   Result Value Ref Range    Glucose 125 (H) 70 - 99 mg/dL          Psychiatric Examination:     /78   Pulse 114   Temp 97.6  F (36.4  C) (Tympanic)   Resp 16   Ht 1.753 m (5' 9\")   Wt 101.6 kg (224 lb)   SpO2 98%   BMI 33.08 kg/m    Weight is 224 lbs 0 oz  Body mass index is 33.08 kg/m .  Lying Orthostatic BP: 120/74      Lying Orthostatic Pulse: 91 bpm      Sitting Orthostatic BP: 122/79      Sitting Orthostatic Pulse: 106 bpm      Standing Orthostatic BP: 89/74      Standing Orthostatic Pulse: 111 bpm       Weight over time:  Vitals:    06/26/19 1527 06/26/19 2102   Weight: 94.8 kg (209 lb) 101.6 kg (224 lb)       Orthostatic Vitals     None            Cardiometabolic risk assessment. 07/04/19      Reviewed patient profile for cardiometabolic risk factors    Date taken /Value  REFERENCE RANGE   Abdominal Obesity  (Waist Circumference)   See nursing flowsheet Women ?35 in (88 cm)   Men ?40 in (102 cm)      Triglycerides  No results found for: TRIG    ?150 mg/dL (1.7 mmol/L) or current treatment for elevated triglycerides   HDL cholesterol  No results found for: HDL]   Women <50 mg/dL (1.3 mmol/L) in women or current treatment for low HDL cholesterol  Men <40 mg/dL (1 mmol/L) in men or current treatment for low HDL cholesterol     Fasting plasma glucose (FPG) Lab Results   Component Value Date     06/26/2019      FPG ?100 mg/dL (5.6 mmol/L) or treatment for elevated blood glucose   Blood pressure  BP Readings from Last 3 Encounters:   07/04/19 " 100/78   04/02/19 110/73    Blood pressure ?130/85 mmHg or treatment for elevated blood pressure   Family History  See family history         Appearance: Awake, alert, calm, sitting in bed, no acute distress  Attitude: Cooperative.  Eye Contact: Reduced  Mood:  Depressed  Affect: Subdued and flattened, blunted reactivity, range restricted.  Speech: Soft, slow, with increased speech latency.   Psychomotor Behavior: Tardive dyskinesia present. Fidgeting of extremities. Appears restless and moderately akathitic. No other psychomotor slowing or agitation present.   Thought Process: Slowed/delayed responses. Had difficulty answering direct questions at times.   Associations: No apparent loosening of associations.   Thought Content: Denies VH. Endorses AH of talking to angels. Positive for delusions with Mosque theme (e.g., people going to hell). Otherwise denies paranoid or grandiose delusions. Denies SI. Denies current urges to assault staff.   Insight: Poor   Judgment: Poor  Orientation: did not formally assess orientation   Attention Span and Concentration: Appears mostly intact, does answer direct questions appropriately.   Recent and Remote Memory: Appears somewhat impaired due to unreliability in recounts of recent events.   Language: Fluent and conversant in English.    Fund of Knowledge: Appears below average.   Muscle Strength and Tone: Normal   Gait and Station: Did not assess as patient was bed bound.          Precautions:     Behavioral Orders   Procedures     Assault precautions     Code 1 - Restrict to Unit     Routine Programming     As clinically indicated     Sexual precautions     Status 15     Every 15 minutes.     Status Individual Observation     Male only.  10 feet space restriction from peers and staff. Staff to stay 10 ft when patient in hallway. OK to sit at door when patient in room.     Order Specific Question:   CONTINUOUS 24 hours / day     Answer:   Other     Order Specific Question:    Specify distance     Answer:   Male only.  10 feet space restriction from peers and staff. Staff to stay 10 ft when patient in hallway. OK to sit at door when patient in room.     Order Specific Question:   Indications for SIO     Answer:   Assault risk     Order Specific Question:   Indications for SIO     Answer:   Severe intrusiveness          DIagnoses:     Schizoaffective disorder bipolar type  Mild intellectual disability  History of Tardive Dyskinesia  History of TBI  Polysubstance use disorder, in remission         Assessment & Plan:     Brayden appears to have some grandiose thoughts or sexual behaviors that continue.  We are planning on increasing his Depo-Provera injection and hopefully this is beneficial for his hypersexuality.  If not effective there is a affective disorder as he has mentions some grandiose content.  If not responding to Depo-Provera will increase clozapine next.    Continue voluntary by guardian  Increasing Depo-Provera  Could consider increasing clozapine    The risks, benefits, alternatives and side effects have been discussed and are understood by the patient and other caregivers.      Cayetano Mosher  [facility] Psychiatry      The following document has been created with voice recognition software and may contain unintentional word substitutions.    Non clinically relevant CMS requirements:  Clinical Global Impressions  First:  Considering your total clinical experience with this particular patient population, how severe are the patient's symptoms at this time?: 6 (06/28/19 2011)  Compared to the patient's condition at the START of treatment, this patient's condition is:: 4 (06/28/19 2011)  Most recent:  Considering your total clinical experience with this particular patient population, how severe are the patient's symptoms at this time?: 6 (06/28/19 2011)  Compared to the patient's condition at the START of treatment, this patient's condition is:: 4 (06/28/19  2011)
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
## 2019-07-05 LAB
GLUCOSE BLDC GLUCOMTR-MCNC: 119 MG/DL (ref 70–99)
GLUCOSE BLDC GLUCOMTR-MCNC: 148 MG/DL (ref 70–99)

## 2019-07-05 PROCEDURE — 25000132 ZZH RX MED GY IP 250 OP 250 PS 637: Performed by: STUDENT IN AN ORGANIZED HEALTH CARE EDUCATION/TRAINING PROGRAM

## 2019-07-05 PROCEDURE — 25000132 ZZH RX MED GY IP 250 OP 250 PS 637: Performed by: PSYCHIATRY & NEUROLOGY

## 2019-07-05 PROCEDURE — 00000146 ZZHCL STATISTIC GLUCOSE BY METER IP

## 2019-07-05 PROCEDURE — 25000128 H RX IP 250 OP 636: Performed by: STUDENT IN AN ORGANIZED HEALTH CARE EDUCATION/TRAINING PROGRAM

## 2019-07-05 PROCEDURE — 12400001 ZZH R&B MH UMMC

## 2019-07-05 RX ADMIN — FENOFIBRATE 160 MG: 160 TABLET, FILM COATED ORAL at 19:32

## 2019-07-05 RX ADMIN — DIVALPROEX SODIUM 1000 MG: 500 TABLET, FILM COATED, EXTENDED RELEASE ORAL at 19:32

## 2019-07-05 RX ADMIN — THERA TABS 1 TABLET: TAB at 08:33

## 2019-07-05 RX ADMIN — CLOZAPINE 50 MG: 25 TABLET ORAL at 19:31

## 2019-07-05 RX ADMIN — METFORMIN HYDROCHLORIDE 1000 MG: 500 TABLET, FILM COATED ORAL at 08:33

## 2019-07-05 RX ADMIN — CLOZAPINE 200 MG: 100 TABLET ORAL at 08:33

## 2019-07-05 RX ADMIN — POLYETHYLENE GLYCOL 3350 17 G: 17 POWDER, FOR SOLUTION ORAL at 08:33

## 2019-07-05 RX ADMIN — DOCUSATE SODIUM 100 MG: 100 CAPSULE, LIQUID FILLED ORAL at 08:33

## 2019-07-05 RX ADMIN — MELATONIN 2000 UNITS: at 08:34

## 2019-07-05 RX ADMIN — PROPRANOLOL HYDROCHLORIDE 80 MG: 80 CAPSULE, EXTENDED RELEASE ORAL at 08:33

## 2019-07-05 RX ADMIN — DIVALPROEX SODIUM 1000 MG: 500 TABLET, FILM COATED, EXTENDED RELEASE ORAL at 08:34

## 2019-07-05 RX ADMIN — SIMVASTATIN 40 MG: 20 TABLET, FILM COATED ORAL at 19:32

## 2019-07-05 RX ADMIN — PANTOPRAZOLE SODIUM 20 MG: 20 TABLET, DELAYED RELEASE ORAL at 08:33

## 2019-07-05 RX ADMIN — Medication 1 G: at 19:32

## 2019-07-05 RX ADMIN — CLOZAPINE 400 MG: 100 TABLET ORAL at 19:32

## 2019-07-05 RX ADMIN — DOCUSATE SODIUM 100 MG: 100 CAPSULE, LIQUID FILLED ORAL at 19:32

## 2019-07-05 RX ADMIN — ARIPIPRAZOLE 10 MG: 10 TABLET ORAL at 08:33

## 2019-07-05 RX ADMIN — ESCITALOPRAM OXALATE 20 MG: 20 TABLET ORAL at 08:33

## 2019-07-05 RX ADMIN — Medication 1 G: at 08:33

## 2019-07-05 RX ADMIN — NIACIN 500 MG: 500 TABLET, FILM COATED, EXTENDED RELEASE ORAL at 19:32

## 2019-07-05 RX ADMIN — MEDROXYPROGESTERONE ACETATE 300 MG: 150 INJECTION, SUSPENSION INTRAMUSCULAR at 10:20

## 2019-07-05 RX ADMIN — METFORMIN HYDROCHLORIDE 1000 MG: 500 TABLET, FILM COATED ORAL at 18:16

## 2019-07-05 ASSESSMENT — ACTIVITIES OF DAILY LIVING (ADL)
LAUNDRY: UNABLE TO COMPLETE
LAUNDRY: UNABLE TO COMPLETE
HYGIENE/GROOMING: SHOWER;INDEPENDENT
DRESS: SCRUBS (BEHAVIORAL HEALTH);INDEPENDENT
ORAL_HYGIENE: PROMPTS
DRESS: SCRUBS (BEHAVIORAL HEALTH);INDEPENDENT
ORAL_HYGIENE: INDEPENDENT
HYGIENE/GROOMING: PROMPTS
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
## 2019-07-06 LAB
GLUCOSE BLDC GLUCOMTR-MCNC: 126 MG/DL (ref 70–99)
GLUCOSE BLDC GLUCOMTR-MCNC: 134 MG/DL (ref 70–99)
VALPROATE SERPL-MCNC: 55 MG/L (ref 50–100)

## 2019-07-06 PROCEDURE — 12400001 ZZH R&B MH UMMC

## 2019-07-06 PROCEDURE — 25000132 ZZH RX MED GY IP 250 OP 250 PS 637: Performed by: PSYCHIATRY & NEUROLOGY

## 2019-07-06 PROCEDURE — 80164 ASSAY DIPROPYLACETIC ACD TOT: CPT | Performed by: STUDENT IN AN ORGANIZED HEALTH CARE EDUCATION/TRAINING PROGRAM

## 2019-07-06 PROCEDURE — 36415 COLL VENOUS BLD VENIPUNCTURE: CPT | Performed by: STUDENT IN AN ORGANIZED HEALTH CARE EDUCATION/TRAINING PROGRAM

## 2019-07-06 PROCEDURE — 25000132 ZZH RX MED GY IP 250 OP 250 PS 637: Performed by: STUDENT IN AN ORGANIZED HEALTH CARE EDUCATION/TRAINING PROGRAM

## 2019-07-06 PROCEDURE — 00000146 ZZHCL STATISTIC GLUCOSE BY METER IP

## 2019-07-06 RX ADMIN — MELATONIN 2000 UNITS: at 08:07

## 2019-07-06 RX ADMIN — PROPRANOLOL HYDROCHLORIDE 80 MG: 80 CAPSULE, EXTENDED RELEASE ORAL at 08:06

## 2019-07-06 RX ADMIN — DIVALPROEX SODIUM 1000 MG: 500 TABLET, FILM COATED, EXTENDED RELEASE ORAL at 08:07

## 2019-07-06 RX ADMIN — DIVALPROEX SODIUM 1000 MG: 500 TABLET, FILM COATED, EXTENDED RELEASE ORAL at 19:18

## 2019-07-06 RX ADMIN — FENOFIBRATE 160 MG: 160 TABLET, FILM COATED ORAL at 19:18

## 2019-07-06 RX ADMIN — ESCITALOPRAM OXALATE 20 MG: 20 TABLET ORAL at 08:07

## 2019-07-06 RX ADMIN — SIMVASTATIN 40 MG: 20 TABLET, FILM COATED ORAL at 19:18

## 2019-07-06 RX ADMIN — POLYETHYLENE GLYCOL 3350 17 G: 17 POWDER, FOR SOLUTION ORAL at 08:07

## 2019-07-06 RX ADMIN — NIACIN 500 MG: 500 TABLET, FILM COATED, EXTENDED RELEASE ORAL at 19:18

## 2019-07-06 RX ADMIN — Medication 1 G: at 19:18

## 2019-07-06 RX ADMIN — PANTOPRAZOLE SODIUM 20 MG: 20 TABLET, DELAYED RELEASE ORAL at 08:07

## 2019-07-06 RX ADMIN — DOCUSATE SODIUM 100 MG: 100 CAPSULE, LIQUID FILLED ORAL at 08:07

## 2019-07-06 RX ADMIN — CLOZAPINE 200 MG: 100 TABLET ORAL at 08:06

## 2019-07-06 RX ADMIN — DOCUSATE SODIUM 100 MG: 100 CAPSULE, LIQUID FILLED ORAL at 19:18

## 2019-07-06 RX ADMIN — CLOZAPINE 50 MG: 25 TABLET ORAL at 19:19

## 2019-07-06 RX ADMIN — METFORMIN HYDROCHLORIDE 1000 MG: 500 TABLET, FILM COATED ORAL at 17:53

## 2019-07-06 RX ADMIN — ARIPIPRAZOLE 10 MG: 10 TABLET ORAL at 08:07

## 2019-07-06 RX ADMIN — THERA TABS 1 TABLET: TAB at 08:07

## 2019-07-06 RX ADMIN — METFORMIN HYDROCHLORIDE 1000 MG: 500 TABLET, FILM COATED ORAL at 08:07

## 2019-07-06 RX ADMIN — Medication 1 G: at 08:07

## 2019-07-06 RX ADMIN — CLOZAPINE 400 MG: 100 TABLET ORAL at 19:18

## 2019-07-06 ASSESSMENT — ACTIVITIES OF DAILY LIVING (ADL)
LAUNDRY: UNABLE TO COMPLETE
ORAL_HYGIENE: PROMPTS
DRESS: SCRUBS (BEHAVIORAL HEALTH);INDEPENDENT
HYGIENE/GROOMING: PROMPTS
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
## 2019-07-07 LAB
GLUCOSE BLDC GLUCOMTR-MCNC: 114 MG/DL (ref 70–99)
GLUCOSE BLDC GLUCOMTR-MCNC: 133 MG/DL (ref 70–99)

## 2019-07-07 PROCEDURE — 25000132 ZZH RX MED GY IP 250 OP 250 PS 637: Performed by: STUDENT IN AN ORGANIZED HEALTH CARE EDUCATION/TRAINING PROGRAM

## 2019-07-07 PROCEDURE — 12400001 ZZH R&B MH UMMC

## 2019-07-07 PROCEDURE — 25000132 ZZH RX MED GY IP 250 OP 250 PS 637: Performed by: PSYCHIATRY & NEUROLOGY

## 2019-07-07 PROCEDURE — 00000146 ZZHCL STATISTIC GLUCOSE BY METER IP

## 2019-07-07 RX ADMIN — NIACIN 500 MG: 500 TABLET, FILM COATED, EXTENDED RELEASE ORAL at 19:43

## 2019-07-07 RX ADMIN — PANTOPRAZOLE SODIUM 20 MG: 20 TABLET, DELAYED RELEASE ORAL at 08:52

## 2019-07-07 RX ADMIN — Medication 1 G: at 08:51

## 2019-07-07 RX ADMIN — FENOFIBRATE 160 MG: 160 TABLET, FILM COATED ORAL at 19:44

## 2019-07-07 RX ADMIN — METFORMIN HYDROCHLORIDE 1000 MG: 500 TABLET, FILM COATED ORAL at 18:26

## 2019-07-07 RX ADMIN — ESCITALOPRAM OXALATE 20 MG: 20 TABLET ORAL at 08:52

## 2019-07-07 RX ADMIN — CLOZAPINE 400 MG: 100 TABLET ORAL at 19:43

## 2019-07-07 RX ADMIN — CLOZAPINE 50 MG: 25 TABLET ORAL at 19:43

## 2019-07-07 RX ADMIN — DOCUSATE SODIUM 100 MG: 100 CAPSULE, LIQUID FILLED ORAL at 19:44

## 2019-07-07 RX ADMIN — CLOZAPINE 200 MG: 100 TABLET ORAL at 08:51

## 2019-07-07 RX ADMIN — METFORMIN HYDROCHLORIDE 1000 MG: 500 TABLET, FILM COATED ORAL at 08:52

## 2019-07-07 RX ADMIN — ARIPIPRAZOLE 10 MG: 10 TABLET ORAL at 08:52

## 2019-07-07 RX ADMIN — DIVALPROEX SODIUM 1000 MG: 500 TABLET, FILM COATED, EXTENDED RELEASE ORAL at 08:51

## 2019-07-07 RX ADMIN — Medication 1 G: at 19:44

## 2019-07-07 RX ADMIN — SIMVASTATIN 40 MG: 20 TABLET, FILM COATED ORAL at 19:43

## 2019-07-07 RX ADMIN — POLYETHYLENE GLYCOL 3350 17 G: 17 POWDER, FOR SOLUTION ORAL at 08:52

## 2019-07-07 RX ADMIN — MELATONIN 2000 UNITS: at 08:51

## 2019-07-07 RX ADMIN — THERA TABS 1 TABLET: TAB at 08:51

## 2019-07-07 RX ADMIN — DIVALPROEX SODIUM 1000 MG: 500 TABLET, FILM COATED, EXTENDED RELEASE ORAL at 19:43

## 2019-07-07 RX ADMIN — DOCUSATE SODIUM 100 MG: 100 CAPSULE, LIQUID FILLED ORAL at 08:52

## 2019-07-07 RX ADMIN — PROPRANOLOL HYDROCHLORIDE 80 MG: 80 CAPSULE, EXTENDED RELEASE ORAL at 08:52

## 2019-07-07 ASSESSMENT — ACTIVITIES OF DAILY LIVING (ADL)
HYGIENE/GROOMING: INDEPENDENT
LAUNDRY: UNABLE TO COMPLETE
DRESS: SCRUBS (BEHAVIORAL HEALTH);INDEPENDENT
DRESS: SCRUBS (BEHAVIORAL HEALTH)
HYGIENE/GROOMING: PROMPTS
ORAL_HYGIENE: PROMPTS
LAUNDRY: UNABLE TO COMPLETE
ORAL_HYGIENE: INDEPENDENT

## 2019-07-07 NOTE — PLAN OF CARE
Problem: Behavior Regulation Impairment (Disruptive Behavior)  Goal: Improved Impulse and Aggression Control (Disruptive Behavior)  Outcome: Declining    Patient had one instance of aggression (See previous notes).  Patient on SIO 1:1 with a 10 ft. space restriction from peers.  For the majority of the shift, the patient was calm and cooperative with staff.  He had a flat and blunted affect.  He cooperated with staff when prompted to shower and shave his face.  Patient endorsed  visions  of people sitting outside his room.  When patient was reminded he had an SIO staff, patient denied it was the staff and he was adamant he was having visions.  He made statements that staff were  out to get me .  He denied feeling depressed, or anxious.  Denied SI. Contracted for safety.      Patient was compliant with his HS medications.  He denied acute physical concerns and medication side effects.

## 2019-07-07 NOTE — PROGRESS NOTES
Pt spent the evening in his room.  Pt denies anx, dep, SI, SIB.  Flat monotone responses to staffs questions.  Pt is not aware of any tx plan, nor what he is to do while here.
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
## 2019-07-07 NOTE — PROVIDER NOTIFICATION
07/07/19 1246   Restraint Type   Other (Comment) Discontinued   Debriefing   Debriefing DO   Does patient understand why the event happened? Yes   Does patient agree to safe behaviors? Yes   What can we do differently so this doesn't happen again? Patient unable to answer   Plan of care reviewed and modified Yes     Patient has no insight into the situation.  He made statements that staff were not letting him sit in the lounge, when the patient was already in the lounge.  Contracted for safety.    Yes

## 2019-07-08 LAB — GLUCOSE BLDC GLUCOMTR-MCNC: 134 MG/DL (ref 70–99)

## 2019-07-08 PROCEDURE — 00000146 ZZHCL STATISTIC GLUCOSE BY METER IP

## 2019-07-08 PROCEDURE — 25000132 ZZH RX MED GY IP 250 OP 250 PS 637: Performed by: PSYCHIATRY & NEUROLOGY

## 2019-07-08 PROCEDURE — 12400001 ZZH R&B MH UMMC

## 2019-07-08 PROCEDURE — 25000132 ZZH RX MED GY IP 250 OP 250 PS 637: Performed by: STUDENT IN AN ORGANIZED HEALTH CARE EDUCATION/TRAINING PROGRAM

## 2019-07-08 PROCEDURE — 99232 SBSQ HOSP IP/OBS MODERATE 35: CPT | Mod: GC | Performed by: PSYCHIATRY & NEUROLOGY

## 2019-07-08 RX ADMIN — DIVALPROEX SODIUM 1000 MG: 500 TABLET, FILM COATED, EXTENDED RELEASE ORAL at 20:09

## 2019-07-08 RX ADMIN — Medication 1 G: at 20:09

## 2019-07-08 RX ADMIN — METFORMIN HYDROCHLORIDE 1000 MG: 500 TABLET, FILM COATED ORAL at 18:03

## 2019-07-08 RX ADMIN — PANTOPRAZOLE SODIUM 20 MG: 20 TABLET, DELAYED RELEASE ORAL at 08:26

## 2019-07-08 RX ADMIN — CLOZAPINE 200 MG: 100 TABLET ORAL at 08:26

## 2019-07-08 RX ADMIN — MELATONIN 2000 UNITS: at 08:26

## 2019-07-08 RX ADMIN — ARIPIPRAZOLE 10 MG: 10 TABLET ORAL at 08:26

## 2019-07-08 RX ADMIN — Medication 1 G: at 08:26

## 2019-07-08 RX ADMIN — CLOZAPINE 400 MG: 100 TABLET ORAL at 20:09

## 2019-07-08 RX ADMIN — NIACIN 500 MG: 500 TABLET, FILM COATED, EXTENDED RELEASE ORAL at 20:09

## 2019-07-08 RX ADMIN — FENOFIBRATE 160 MG: 160 TABLET, FILM COATED ORAL at 20:09

## 2019-07-08 RX ADMIN — ESCITALOPRAM OXALATE 20 MG: 20 TABLET ORAL at 08:26

## 2019-07-08 RX ADMIN — DOCUSATE SODIUM 100 MG: 100 CAPSULE, LIQUID FILLED ORAL at 20:09

## 2019-07-08 RX ADMIN — PROPRANOLOL HYDROCHLORIDE 80 MG: 80 CAPSULE, EXTENDED RELEASE ORAL at 08:26

## 2019-07-08 RX ADMIN — METFORMIN HYDROCHLORIDE 1000 MG: 500 TABLET, FILM COATED ORAL at 08:26

## 2019-07-08 RX ADMIN — DOCUSATE SODIUM 100 MG: 100 CAPSULE, LIQUID FILLED ORAL at 08:26

## 2019-07-08 RX ADMIN — THERA TABS 1 TABLET: TAB at 08:26

## 2019-07-08 RX ADMIN — POLYETHYLENE GLYCOL 3350 17 G: 17 POWDER, FOR SOLUTION ORAL at 08:26

## 2019-07-08 RX ADMIN — SIMVASTATIN 40 MG: 20 TABLET, FILM COATED ORAL at 20:09

## 2019-07-08 RX ADMIN — CLOZAPINE 50 MG: 25 TABLET ORAL at 20:09

## 2019-07-08 RX ADMIN — DIVALPROEX SODIUM 1000 MG: 500 TABLET, FILM COATED, EXTENDED RELEASE ORAL at 08:26

## 2019-07-08 ASSESSMENT — ACTIVITIES OF DAILY LIVING (ADL)
DRESS: SCRUBS (BEHAVIORAL HEALTH)
ORAL_HYGIENE: PROMPTS
DRESS: SCRUBS (BEHAVIORAL HEALTH)
HYGIENE/GROOMING: PROMPTS
ORAL_HYGIENE: PROMPTS
HYGIENE/GROOMING: PROMPTS
LAUNDRY: UNABLE TO COMPLETE
LAUNDRY: UNABLE TO COMPLETE
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
## 2019-07-09 LAB
GLUCOSE BLDC GLUCOMTR-MCNC: 123 MG/DL (ref 70–99)
GLUCOSE BLDC GLUCOMTR-MCNC: 138 MG/DL (ref 70–99)

## 2019-07-09 PROCEDURE — 25000132 ZZH RX MED GY IP 250 OP 250 PS 637: Performed by: PSYCHIATRY & NEUROLOGY

## 2019-07-09 PROCEDURE — 12400001 ZZH R&B MH UMMC

## 2019-07-09 PROCEDURE — 25000132 ZZH RX MED GY IP 250 OP 250 PS 637: Performed by: STUDENT IN AN ORGANIZED HEALTH CARE EDUCATION/TRAINING PROGRAM

## 2019-07-09 PROCEDURE — 00000146 ZZHCL STATISTIC GLUCOSE BY METER IP

## 2019-07-09 PROCEDURE — 99232 SBSQ HOSP IP/OBS MODERATE 35: CPT | Mod: GC | Performed by: PSYCHIATRY & NEUROLOGY

## 2019-07-09 RX ADMIN — Medication 1 G: at 19:52

## 2019-07-09 RX ADMIN — Medication 1 G: at 08:58

## 2019-07-09 RX ADMIN — DIVALPROEX SODIUM 1000 MG: 500 TABLET, FILM COATED, EXTENDED RELEASE ORAL at 19:52

## 2019-07-09 RX ADMIN — MELATONIN 2000 UNITS: at 08:56

## 2019-07-09 RX ADMIN — DIVALPROEX SODIUM 1000 MG: 500 TABLET, FILM COATED, EXTENDED RELEASE ORAL at 08:59

## 2019-07-09 RX ADMIN — ARIPIPRAZOLE 10 MG: 10 TABLET ORAL at 08:58

## 2019-07-09 RX ADMIN — DOCUSATE SODIUM 100 MG: 100 CAPSULE, LIQUID FILLED ORAL at 08:56

## 2019-07-09 RX ADMIN — SIMVASTATIN 40 MG: 20 TABLET, FILM COATED ORAL at 19:52

## 2019-07-09 RX ADMIN — PANTOPRAZOLE SODIUM 20 MG: 20 TABLET, DELAYED RELEASE ORAL at 08:58

## 2019-07-09 RX ADMIN — POLYETHYLENE GLYCOL 3350 17 G: 17 POWDER, FOR SOLUTION ORAL at 08:56

## 2019-07-09 RX ADMIN — THERA TABS 1 TABLET: TAB at 08:56

## 2019-07-09 RX ADMIN — NIACIN 500 MG: 500 TABLET, FILM COATED, EXTENDED RELEASE ORAL at 19:52

## 2019-07-09 RX ADMIN — CLOZAPINE 200 MG: 100 TABLET ORAL at 08:58

## 2019-07-09 RX ADMIN — PROPRANOLOL HYDROCHLORIDE 80 MG: 80 CAPSULE, EXTENDED RELEASE ORAL at 08:56

## 2019-07-09 RX ADMIN — METFORMIN HYDROCHLORIDE 1000 MG: 500 TABLET, FILM COATED ORAL at 18:09

## 2019-07-09 RX ADMIN — FENOFIBRATE 160 MG: 160 TABLET, FILM COATED ORAL at 19:52

## 2019-07-09 RX ADMIN — CLOZAPINE 450 MG: 100 TABLET ORAL at 19:55

## 2019-07-09 RX ADMIN — DOCUSATE SODIUM 100 MG: 100 CAPSULE, LIQUID FILLED ORAL at 19:52

## 2019-07-09 RX ADMIN — METFORMIN HYDROCHLORIDE 1000 MG: 500 TABLET, FILM COATED ORAL at 08:58

## 2019-07-09 RX ADMIN — ESCITALOPRAM OXALATE 20 MG: 20 TABLET ORAL at 08:59

## 2019-07-09 ASSESSMENT — ACTIVITIES OF DAILY LIVING (ADL)
ORAL_HYGIENE: PROMPTS
LAUNDRY: UNABLE TO COMPLETE
HYGIENE/GROOMING: PROMPTS;SHOWER
DRESS: PROMPTS;SCRUBS (BEHAVIORAL HEALTH)
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
## 2019-07-09 NOTE — PROGRESS NOTES
Temp mildly elevated, though afebrile at 99.4 degrees F. Stable on reassessment and monitoring this evening. Oncoming RN notified and will continue to monitor as tolerated/indicated.

## 2019-07-10 LAB
BASOPHILS # BLD AUTO: 0.1 10E9/L (ref 0–0.2)
BASOPHILS NFR BLD AUTO: 0.6 %
DIFFERENTIAL METHOD BLD: NORMAL
EOSINOPHIL # BLD AUTO: 0.2 10E9/L (ref 0–0.7)
EOSINOPHIL NFR BLD AUTO: 2.2 %
GLUCOSE BLDC GLUCOMTR-MCNC: 110 MG/DL (ref 70–99)
GLUCOSE BLDC GLUCOMTR-MCNC: 134 MG/DL (ref 70–99)
IMM GRANULOCYTES # BLD: 0.1 10E9/L (ref 0–0.4)
IMM GRANULOCYTES NFR BLD: 0.6 %
LYMPHOCYTES # BLD AUTO: 3.6 10E9/L (ref 0.8–5.3)
LYMPHOCYTES NFR BLD AUTO: 39.3 %
MONOCYTES # BLD AUTO: 0.7 10E9/L (ref 0–1.3)
MONOCYTES NFR BLD AUTO: 7.7 %
NEUTROPHILS # BLD AUTO: 4.5 10E9/L (ref 1.6–8.3)
NEUTROPHILS NFR BLD AUTO: 49.6 %
NRBC # BLD AUTO: 0 10*3/UL
NRBC BLD AUTO-RTO: 0 /100
WBC # BLD AUTO: 9.1 10E9/L (ref 4–11)

## 2019-07-10 PROCEDURE — 85004 AUTOMATED DIFF WBC COUNT: CPT | Performed by: PSYCHIATRY & NEUROLOGY

## 2019-07-10 PROCEDURE — 25000132 ZZH RX MED GY IP 250 OP 250 PS 637: Performed by: PSYCHIATRY & NEUROLOGY

## 2019-07-10 PROCEDURE — 99232 SBSQ HOSP IP/OBS MODERATE 35: CPT | Mod: GC | Performed by: PSYCHIATRY & NEUROLOGY

## 2019-07-10 PROCEDURE — 25000132 ZZH RX MED GY IP 250 OP 250 PS 637: Performed by: STUDENT IN AN ORGANIZED HEALTH CARE EDUCATION/TRAINING PROGRAM

## 2019-07-10 PROCEDURE — 36415 COLL VENOUS BLD VENIPUNCTURE: CPT | Performed by: PSYCHIATRY & NEUROLOGY

## 2019-07-10 PROCEDURE — 12400001 ZZH R&B MH UMMC

## 2019-07-10 PROCEDURE — 85048 AUTOMATED LEUKOCYTE COUNT: CPT | Performed by: PSYCHIATRY & NEUROLOGY

## 2019-07-10 PROCEDURE — 00000146 ZZHCL STATISTIC GLUCOSE BY METER IP

## 2019-07-10 RX ADMIN — Medication 1 G: at 08:26

## 2019-07-10 RX ADMIN — NIACIN 500 MG: 500 TABLET, FILM COATED, EXTENDED RELEASE ORAL at 19:16

## 2019-07-10 RX ADMIN — DIVALPROEX SODIUM 1000 MG: 500 TABLET, FILM COATED, EXTENDED RELEASE ORAL at 19:15

## 2019-07-10 RX ADMIN — MELATONIN 2000 UNITS: at 08:25

## 2019-07-10 RX ADMIN — THERA TABS 1 TABLET: TAB at 08:25

## 2019-07-10 RX ADMIN — DIVALPROEX SODIUM 1000 MG: 500 TABLET, FILM COATED, EXTENDED RELEASE ORAL at 08:25

## 2019-07-10 RX ADMIN — PANTOPRAZOLE SODIUM 20 MG: 20 TABLET, DELAYED RELEASE ORAL at 08:25

## 2019-07-10 RX ADMIN — METFORMIN HYDROCHLORIDE 1000 MG: 500 TABLET, FILM COATED ORAL at 08:25

## 2019-07-10 RX ADMIN — ESCITALOPRAM OXALATE 20 MG: 20 TABLET ORAL at 08:26

## 2019-07-10 RX ADMIN — OLANZAPINE 10 MG: 10 TABLET, FILM COATED ORAL at 15:53

## 2019-07-10 RX ADMIN — PROPRANOLOL HYDROCHLORIDE 80 MG: 80 CAPSULE, EXTENDED RELEASE ORAL at 08:25

## 2019-07-10 RX ADMIN — Medication 1 G: at 19:15

## 2019-07-10 RX ADMIN — SIMVASTATIN 40 MG: 20 TABLET, FILM COATED ORAL at 19:15

## 2019-07-10 RX ADMIN — DOCUSATE SODIUM 100 MG: 100 CAPSULE, LIQUID FILLED ORAL at 19:15

## 2019-07-10 RX ADMIN — POLYETHYLENE GLYCOL 3350 17 G: 17 POWDER, FOR SOLUTION ORAL at 08:26

## 2019-07-10 RX ADMIN — METFORMIN HYDROCHLORIDE 1000 MG: 500 TABLET, FILM COATED ORAL at 17:13

## 2019-07-10 RX ADMIN — CLOZAPINE 450 MG: 100 TABLET ORAL at 19:15

## 2019-07-10 RX ADMIN — DOCUSATE SODIUM 100 MG: 100 CAPSULE, LIQUID FILLED ORAL at 08:25

## 2019-07-10 RX ADMIN — FENOFIBRATE 160 MG: 160 TABLET, FILM COATED ORAL at 19:16

## 2019-07-10 RX ADMIN — CLOZAPINE 200 MG: 100 TABLET ORAL at 08:25

## 2019-07-10 RX ADMIN — ARIPIPRAZOLE 10 MG: 10 TABLET ORAL at 08:25

## 2019-07-10 ASSESSMENT — ACTIVITIES OF DAILY LIVING (ADL)
ORAL_HYGIENE: PROMPTS
HYGIENE/GROOMING: PROMPTS
DRESS: SCRUBS (BEHAVIORAL HEALTH)
HYGIENE/GROOMING: PROMPTS
DRESS: SCRUBS (BEHAVIORAL HEALTH)
LAUNDRY: UNABLE TO COMPLETE
LAUNDRY: UNABLE TO COMPLETE
ORAL_HYGIENE: PROMPTS
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
## 2019-07-11 LAB
GLUCOSE BLDC GLUCOMTR-MCNC: 115 MG/DL (ref 70–99)
GLUCOSE BLDC GLUCOMTR-MCNC: 136 MG/DL (ref 70–99)

## 2019-07-11 PROCEDURE — 25000132 ZZH RX MED GY IP 250 OP 250 PS 637: Performed by: PSYCHIATRY & NEUROLOGY

## 2019-07-11 PROCEDURE — 25000132 ZZH RX MED GY IP 250 OP 250 PS 637: Performed by: STUDENT IN AN ORGANIZED HEALTH CARE EDUCATION/TRAINING PROGRAM

## 2019-07-11 PROCEDURE — 12400001 ZZH R&B MH UMMC

## 2019-07-11 PROCEDURE — 00000146 ZZHCL STATISTIC GLUCOSE BY METER IP

## 2019-07-11 RX ADMIN — PROPRANOLOL HYDROCHLORIDE 80 MG: 80 CAPSULE, EXTENDED RELEASE ORAL at 09:05

## 2019-07-11 RX ADMIN — METFORMIN HYDROCHLORIDE 1000 MG: 500 TABLET, FILM COATED ORAL at 09:04

## 2019-07-11 RX ADMIN — POLYETHYLENE GLYCOL 3350 17 G: 17 POWDER, FOR SOLUTION ORAL at 09:04

## 2019-07-11 RX ADMIN — THERA TABS 1 TABLET: TAB at 09:04

## 2019-07-11 RX ADMIN — Medication 1 G: at 19:54

## 2019-07-11 RX ADMIN — CLOZAPINE 450 MG: 100 TABLET ORAL at 19:54

## 2019-07-11 RX ADMIN — SIMVASTATIN 40 MG: 20 TABLET, FILM COATED ORAL at 19:54

## 2019-07-11 RX ADMIN — DOCUSATE SODIUM 100 MG: 100 CAPSULE, LIQUID FILLED ORAL at 09:03

## 2019-07-11 RX ADMIN — MELATONIN 2000 UNITS: at 09:05

## 2019-07-11 RX ADMIN — METFORMIN HYDROCHLORIDE 1000 MG: 500 TABLET, FILM COATED ORAL at 19:54

## 2019-07-11 RX ADMIN — ESCITALOPRAM OXALATE 20 MG: 20 TABLET ORAL at 09:03

## 2019-07-11 RX ADMIN — Medication 1 G: at 09:03

## 2019-07-11 RX ADMIN — DIVALPROEX SODIUM 1000 MG: 500 TABLET, FILM COATED, EXTENDED RELEASE ORAL at 19:54

## 2019-07-11 RX ADMIN — DOCUSATE SODIUM 100 MG: 100 CAPSULE, LIQUID FILLED ORAL at 19:54

## 2019-07-11 RX ADMIN — DIVALPROEX SODIUM 1000 MG: 500 TABLET, FILM COATED, EXTENDED RELEASE ORAL at 09:03

## 2019-07-11 RX ADMIN — NIACIN 500 MG: 500 TABLET, FILM COATED, EXTENDED RELEASE ORAL at 19:54

## 2019-07-11 RX ADMIN — PANTOPRAZOLE SODIUM 20 MG: 20 TABLET, DELAYED RELEASE ORAL at 09:04

## 2019-07-11 RX ADMIN — FENOFIBRATE 160 MG: 160 TABLET, FILM COATED ORAL at 19:54

## 2019-07-11 RX ADMIN — CLOZAPINE 200 MG: 100 TABLET ORAL at 09:02

## 2019-07-11 RX ADMIN — ARIPIPRAZOLE 10 MG: 10 TABLET ORAL at 09:02

## 2019-07-11 ASSESSMENT — ACTIVITIES OF DAILY LIVING (ADL)
DRESS: SCRUBS (BEHAVIORAL HEALTH)
LAUNDRY: UNABLE TO COMPLETE
LAUNDRY: UNABLE TO COMPLETE
HYGIENE/GROOMING: PROMPTS
ORAL_HYGIENE: PROMPTS
ORAL_HYGIENE: PROMPTS
DRESS: SCRUBS (BEHAVIORAL HEALTH)
HYGIENE/GROOMING: PROMPTS
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
## 2019-07-11 NOTE — PROGRESS NOTES
called and spoke with pt's group home staff (Aretha) and pt's father/guardian (Jose.)    I updated both.   Aretha says she'll plan to come visit pt early next week.

## 2019-07-12 LAB
GLUCOSE BLDC GLUCOMTR-MCNC: 123 MG/DL (ref 70–99)
GLUCOSE BLDC GLUCOMTR-MCNC: 238 MG/DL (ref 70–99)

## 2019-07-12 PROCEDURE — 12400001 ZZH R&B MH UMMC

## 2019-07-12 PROCEDURE — 99232 SBSQ HOSP IP/OBS MODERATE 35: CPT | Performed by: PSYCHIATRY & NEUROLOGY

## 2019-07-12 PROCEDURE — 25000128 H RX IP 250 OP 636: Performed by: STUDENT IN AN ORGANIZED HEALTH CARE EDUCATION/TRAINING PROGRAM

## 2019-07-12 PROCEDURE — 25000132 ZZH RX MED GY IP 250 OP 250 PS 637: Performed by: PSYCHIATRY & NEUROLOGY

## 2019-07-12 PROCEDURE — 25000132 ZZH RX MED GY IP 250 OP 250 PS 637: Performed by: STUDENT IN AN ORGANIZED HEALTH CARE EDUCATION/TRAINING PROGRAM

## 2019-07-12 PROCEDURE — 00000146 ZZHCL STATISTIC GLUCOSE BY METER IP

## 2019-07-12 RX ADMIN — CLOZAPINE 200 MG: 100 TABLET ORAL at 07:58

## 2019-07-12 RX ADMIN — SIMVASTATIN 40 MG: 20 TABLET, FILM COATED ORAL at 19:21

## 2019-07-12 RX ADMIN — ESCITALOPRAM OXALATE 20 MG: 20 TABLET ORAL at 07:59

## 2019-07-12 RX ADMIN — PANTOPRAZOLE SODIUM 20 MG: 20 TABLET, DELAYED RELEASE ORAL at 07:58

## 2019-07-12 RX ADMIN — FENOFIBRATE 160 MG: 160 TABLET, FILM COATED ORAL at 19:21

## 2019-07-12 RX ADMIN — METFORMIN HYDROCHLORIDE 1000 MG: 500 TABLET, FILM COATED ORAL at 07:58

## 2019-07-12 RX ADMIN — MELATONIN 2000 UNITS: at 07:58

## 2019-07-12 RX ADMIN — DIVALPROEX SODIUM 1000 MG: 500 TABLET, FILM COATED, EXTENDED RELEASE ORAL at 07:58

## 2019-07-12 RX ADMIN — THERA TABS 1 TABLET: TAB at 07:59

## 2019-07-12 RX ADMIN — DIVALPROEX SODIUM 1000 MG: 500 TABLET, FILM COATED, EXTENDED RELEASE ORAL at 19:21

## 2019-07-12 RX ADMIN — NIACIN 500 MG: 500 TABLET, FILM COATED, EXTENDED RELEASE ORAL at 19:21

## 2019-07-12 RX ADMIN — ARIPIPRAZOLE 10 MG: 10 TABLET ORAL at 07:58

## 2019-07-12 RX ADMIN — METFORMIN HYDROCHLORIDE 1000 MG: 500 TABLET, FILM COATED ORAL at 18:05

## 2019-07-12 RX ADMIN — DOCUSATE SODIUM 100 MG: 100 CAPSULE, LIQUID FILLED ORAL at 07:58

## 2019-07-12 RX ADMIN — CLOZAPINE 450 MG: 100 TABLET ORAL at 19:21

## 2019-07-12 RX ADMIN — DOCUSATE SODIUM 100 MG: 100 CAPSULE, LIQUID FILLED ORAL at 19:21

## 2019-07-12 RX ADMIN — Medication 1 G: at 19:21

## 2019-07-12 RX ADMIN — PROPRANOLOL HYDROCHLORIDE 80 MG: 80 CAPSULE, EXTENDED RELEASE ORAL at 07:58

## 2019-07-12 RX ADMIN — Medication 1 G: at 07:58

## 2019-07-12 RX ADMIN — POLYETHYLENE GLYCOL 3350 17 G: 17 POWDER, FOR SOLUTION ORAL at 07:58

## 2019-07-12 RX ADMIN — MEDROXYPROGESTERONE ACETATE 300 MG: 150 INJECTION, SUSPENSION INTRAMUSCULAR at 15:47

## 2019-07-12 NOTE — PLAN OF CARE
No behavioral issue this evening. He spent most of this evening in his room resting in bed and watching TV. He endorses commanding auditory hallucination but promised not to act on it. Pt remains medication compliant and denies Se's. Hygiene maintenance remains poor. Good appetite. No SI/SIB.
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
## 2019-07-12 NOTE — PROGRESS NOTES
No behavioral issue this evening. He spent most of this evening in his room resting in bed and watching TV. He endorses commanding auditory hallucination but promised not to act on it. Pt remains medication compliant and denies Se s. Hygiene maintenance remains poor. Good appetite. No SI/SIB.

## 2019-07-13 LAB
GLUCOSE BLDC GLUCOMTR-MCNC: 123 MG/DL (ref 70–99)
GLUCOSE BLDC GLUCOMTR-MCNC: 149 MG/DL (ref 70–99)

## 2019-07-13 PROCEDURE — 25000132 ZZH RX MED GY IP 250 OP 250 PS 637: Performed by: PSYCHIATRY & NEUROLOGY

## 2019-07-13 PROCEDURE — 25000132 ZZH RX MED GY IP 250 OP 250 PS 637: Performed by: STUDENT IN AN ORGANIZED HEALTH CARE EDUCATION/TRAINING PROGRAM

## 2019-07-13 PROCEDURE — 00000146 ZZHCL STATISTIC GLUCOSE BY METER IP

## 2019-07-13 PROCEDURE — 12400001 ZZH R&B MH UMMC

## 2019-07-13 RX ADMIN — FENOFIBRATE 160 MG: 160 TABLET, FILM COATED ORAL at 19:51

## 2019-07-13 RX ADMIN — DIVALPROEX SODIUM 1000 MG: 500 TABLET, FILM COATED, EXTENDED RELEASE ORAL at 19:51

## 2019-07-13 RX ADMIN — Medication 1 G: at 08:33

## 2019-07-13 RX ADMIN — NIACIN 500 MG: 500 TABLET, FILM COATED, EXTENDED RELEASE ORAL at 19:50

## 2019-07-13 RX ADMIN — THERA TABS 1 TABLET: TAB at 08:34

## 2019-07-13 RX ADMIN — DIVALPROEX SODIUM 1000 MG: 500 TABLET, FILM COATED, EXTENDED RELEASE ORAL at 08:33

## 2019-07-13 RX ADMIN — ESCITALOPRAM OXALATE 20 MG: 20 TABLET ORAL at 08:33

## 2019-07-13 RX ADMIN — ARIPIPRAZOLE 10 MG: 10 TABLET ORAL at 08:33

## 2019-07-13 RX ADMIN — METFORMIN HYDROCHLORIDE 1000 MG: 500 TABLET, FILM COATED ORAL at 08:34

## 2019-07-13 RX ADMIN — METFORMIN HYDROCHLORIDE 1000 MG: 500 TABLET, FILM COATED ORAL at 17:42

## 2019-07-13 RX ADMIN — MELATONIN 2000 UNITS: at 08:34

## 2019-07-13 RX ADMIN — DOCUSATE SODIUM 100 MG: 100 CAPSULE, LIQUID FILLED ORAL at 08:33

## 2019-07-13 RX ADMIN — CLOZAPINE 200 MG: 100 TABLET ORAL at 08:33

## 2019-07-13 RX ADMIN — DOCUSATE SODIUM 100 MG: 100 CAPSULE, LIQUID FILLED ORAL at 19:51

## 2019-07-13 RX ADMIN — Medication 1 G: at 19:51

## 2019-07-13 RX ADMIN — CLOZAPINE 450 MG: 100 TABLET ORAL at 19:51

## 2019-07-13 RX ADMIN — PANTOPRAZOLE SODIUM 20 MG: 20 TABLET, DELAYED RELEASE ORAL at 08:34

## 2019-07-13 RX ADMIN — SIMVASTATIN 40 MG: 20 TABLET, FILM COATED ORAL at 19:51

## 2019-07-13 RX ADMIN — PROPRANOLOL HYDROCHLORIDE 80 MG: 80 CAPSULE, EXTENDED RELEASE ORAL at 08:34

## 2019-07-13 RX ADMIN — POLYETHYLENE GLYCOL 3350 17 G: 17 POWDER, FOR SOLUTION ORAL at 08:34

## 2019-07-13 ASSESSMENT — ACTIVITIES OF DAILY LIVING (ADL)
ORAL_HYGIENE: INDEPENDENT
HYGIENE/GROOMING: INDEPENDENT
DRESS: SCRUBS (BEHAVIORAL HEALTH)

## 2019-07-13 NOTE — PROGRESS NOTES
Pt did not have any observed behavioral issues this evening. Pt stayed in their room and watched tv for all of the shift.
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
## 2019-07-14 LAB — GLUCOSE BLDC GLUCOMTR-MCNC: 140 MG/DL (ref 70–99)

## 2019-07-14 PROCEDURE — 00000146 ZZHCL STATISTIC GLUCOSE BY METER IP

## 2019-07-14 PROCEDURE — 25000132 ZZH RX MED GY IP 250 OP 250 PS 637: Performed by: PSYCHIATRY & NEUROLOGY

## 2019-07-14 PROCEDURE — 25000132 ZZH RX MED GY IP 250 OP 250 PS 637: Performed by: STUDENT IN AN ORGANIZED HEALTH CARE EDUCATION/TRAINING PROGRAM

## 2019-07-14 PROCEDURE — 12400001 ZZH R&B MH UMMC

## 2019-07-14 RX ADMIN — FENOFIBRATE 160 MG: 160 TABLET, FILM COATED ORAL at 20:52

## 2019-07-14 RX ADMIN — THERA TABS 1 TABLET: TAB at 08:24

## 2019-07-14 RX ADMIN — SIMVASTATIN 40 MG: 20 TABLET, FILM COATED ORAL at 20:52

## 2019-07-14 RX ADMIN — NIACIN 500 MG: 500 TABLET, FILM COATED, EXTENDED RELEASE ORAL at 20:52

## 2019-07-14 RX ADMIN — PROPRANOLOL HYDROCHLORIDE 80 MG: 80 CAPSULE, EXTENDED RELEASE ORAL at 08:24

## 2019-07-14 RX ADMIN — DIVALPROEX SODIUM 1000 MG: 500 TABLET, FILM COATED, EXTENDED RELEASE ORAL at 08:24

## 2019-07-14 RX ADMIN — DOCUSATE SODIUM 100 MG: 100 CAPSULE, LIQUID FILLED ORAL at 20:52

## 2019-07-14 RX ADMIN — POLYETHYLENE GLYCOL 3350 17 G: 17 POWDER, FOR SOLUTION ORAL at 08:24

## 2019-07-14 RX ADMIN — ARIPIPRAZOLE 10 MG: 10 TABLET ORAL at 08:24

## 2019-07-14 RX ADMIN — MELATONIN 2000 UNITS: at 08:24

## 2019-07-14 RX ADMIN — PANTOPRAZOLE SODIUM 20 MG: 20 TABLET, DELAYED RELEASE ORAL at 08:24

## 2019-07-14 RX ADMIN — CLOZAPINE 200 MG: 100 TABLET ORAL at 08:24

## 2019-07-14 RX ADMIN — Medication 1 G: at 20:52

## 2019-07-14 RX ADMIN — DIVALPROEX SODIUM 1000 MG: 500 TABLET, FILM COATED, EXTENDED RELEASE ORAL at 20:52

## 2019-07-14 RX ADMIN — METFORMIN HYDROCHLORIDE 1000 MG: 500 TABLET, FILM COATED ORAL at 17:35

## 2019-07-14 RX ADMIN — Medication 1 G: at 08:24

## 2019-07-14 RX ADMIN — CLOZAPINE 450 MG: 100 TABLET ORAL at 20:51

## 2019-07-14 RX ADMIN — ESCITALOPRAM OXALATE 20 MG: 20 TABLET ORAL at 08:24

## 2019-07-14 RX ADMIN — METFORMIN HYDROCHLORIDE 1000 MG: 500 TABLET, FILM COATED ORAL at 08:24

## 2019-07-14 RX ADMIN — DOCUSATE SODIUM 100 MG: 100 CAPSULE, LIQUID FILLED ORAL at 08:24

## 2019-07-14 ASSESSMENT — ACTIVITIES OF DAILY LIVING (ADL)
HYGIENE/GROOMING: INDEPENDENT
DRESS: SCRUBS (BEHAVIORAL HEALTH)
ORAL_HYGIENE: INDEPENDENT

## 2019-07-14 NOTE — PLAN OF CARE
Pt's presentation seems to be about the same the last 48 hours. Overall pt has remained calm and compliant this weekend. Pt is agreeable to treatment requests, took all his medications and has not been violent towards staff. Pt completed ADLs including showering and picking up his room. Pt's VS were WDL and his blood sugar remains well controlled. No other physical health concerns or side effects from medication at this time.
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
## 2019-07-15 LAB — GLUCOSE BLDC GLUCOMTR-MCNC: 129 MG/DL (ref 70–99)

## 2019-07-15 PROCEDURE — 25000132 ZZH RX MED GY IP 250 OP 250 PS 637: Performed by: PSYCHIATRY & NEUROLOGY

## 2019-07-15 PROCEDURE — 00000146 ZZHCL STATISTIC GLUCOSE BY METER IP

## 2019-07-15 PROCEDURE — 99232 SBSQ HOSP IP/OBS MODERATE 35: CPT | Mod: GC | Performed by: PSYCHIATRY & NEUROLOGY

## 2019-07-15 PROCEDURE — 12400001 ZZH R&B MH UMMC

## 2019-07-15 PROCEDURE — 25000132 ZZH RX MED GY IP 250 OP 250 PS 637: Performed by: STUDENT IN AN ORGANIZED HEALTH CARE EDUCATION/TRAINING PROGRAM

## 2019-07-15 RX ADMIN — ARIPIPRAZOLE 10 MG: 10 TABLET ORAL at 08:30

## 2019-07-15 RX ADMIN — SIMVASTATIN 40 MG: 20 TABLET, FILM COATED ORAL at 20:42

## 2019-07-15 RX ADMIN — NIACIN 500 MG: 500 TABLET, FILM COATED, EXTENDED RELEASE ORAL at 20:42

## 2019-07-15 RX ADMIN — MELATONIN 2000 UNITS: at 08:29

## 2019-07-15 RX ADMIN — PROPRANOLOL HYDROCHLORIDE 80 MG: 80 CAPSULE, EXTENDED RELEASE ORAL at 08:30

## 2019-07-15 RX ADMIN — Medication 1 G: at 08:29

## 2019-07-15 RX ADMIN — THERA TABS 1 TABLET: TAB at 08:30

## 2019-07-15 RX ADMIN — PANTOPRAZOLE SODIUM 20 MG: 20 TABLET, DELAYED RELEASE ORAL at 08:30

## 2019-07-15 RX ADMIN — DOCUSATE SODIUM 100 MG: 100 CAPSULE, LIQUID FILLED ORAL at 20:42

## 2019-07-15 RX ADMIN — DIVALPROEX SODIUM 1000 MG: 500 TABLET, FILM COATED, EXTENDED RELEASE ORAL at 08:30

## 2019-07-15 RX ADMIN — CLOZAPINE 450 MG: 100 TABLET ORAL at 20:42

## 2019-07-15 RX ADMIN — METFORMIN HYDROCHLORIDE 1000 MG: 500 TABLET, FILM COATED ORAL at 08:30

## 2019-07-15 RX ADMIN — Medication 1 G: at 20:42

## 2019-07-15 RX ADMIN — DIVALPROEX SODIUM 1000 MG: 500 TABLET, FILM COATED, EXTENDED RELEASE ORAL at 20:42

## 2019-07-15 RX ADMIN — ESCITALOPRAM OXALATE 20 MG: 20 TABLET ORAL at 08:29

## 2019-07-15 RX ADMIN — FENOFIBRATE 160 MG: 160 TABLET, FILM COATED ORAL at 20:42

## 2019-07-15 RX ADMIN — DOCUSATE SODIUM 100 MG: 100 CAPSULE, LIQUID FILLED ORAL at 08:29

## 2019-07-15 RX ADMIN — POLYETHYLENE GLYCOL 3350 17 G: 17 POWDER, FOR SOLUTION ORAL at 08:29

## 2019-07-15 RX ADMIN — METFORMIN HYDROCHLORIDE 1000 MG: 500 TABLET, FILM COATED ORAL at 18:09

## 2019-07-15 RX ADMIN — CLOZAPINE 200 MG: 100 TABLET ORAL at 08:30

## 2019-07-15 ASSESSMENT — ACTIVITIES OF DAILY LIVING (ADL)
ORAL_HYGIENE: INDEPENDENT
DRESS: SCRUBS (BEHAVIORAL HEALTH)
HYGIENE/GROOMING: INDEPENDENT

## 2019-07-15 NOTE — PROGRESS NOTES
07/14/19 2200   Behavioral Health   Hallucinations denies / not responding to hallucinations   Thinking poor concentration   Orientation person: oriented   Memory baseline memory   Insight poor   Judgement impaired   Affect blunted, flat   Mood mood is calm   Physical Appearance/Attire attire appropriate to age and situation   Hygiene well groomed   Suicidality   (denies)   1. Wish to be Dead   (no)     Pt had a calm shift,had his meals but was inside his room most of the time.no SI,SIB observed.

## 2019-07-15 NOTE — PROGRESS NOTES
"   07/15/19 1300   Behavioral Health   Hallucinations appears responding   Thinking confused;delusional   Insight denial of illness;poor   Judgement impaired   Eye Contact staring;into space   Affect full range affect   Mood mood is calm   1. Wish to be Dead No   2. Non-Specific Active Suicidal Thoughts  No   Speech coherent;circumstantial   Psychomotor / Gait balanced;steady   Psycho Education   Type of Intervention 1:1 intervention   Response participates, initiates socially appropriate   Hours 0.5   Treatment Detail Check In   Activities of Daily Living   Hygiene/Grooming independent   Oral Hygiene independent   Dress scrubs (behavioral health)   Room Organization independent   Pt was calm and cooperative with staff throughout the shift. He stated that his mood is \"good\" and that he is having a \"good day.\" Pt denies all current symptoms including SI, but pt appears to be responding. He exhibited a range of affect, smiling and laughing at times.   "
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
## 2019-07-16 LAB — GLUCOSE BLDC GLUCOMTR-MCNC: 143 MG/DL (ref 70–99)

## 2019-07-16 PROCEDURE — 25000132 ZZH RX MED GY IP 250 OP 250 PS 637: Performed by: PSYCHIATRY & NEUROLOGY

## 2019-07-16 PROCEDURE — 99232 SBSQ HOSP IP/OBS MODERATE 35: CPT | Mod: GC | Performed by: PSYCHIATRY & NEUROLOGY

## 2019-07-16 PROCEDURE — 12400001 ZZH R&B MH UMMC

## 2019-07-16 PROCEDURE — 00000146 ZZHCL STATISTIC GLUCOSE BY METER IP

## 2019-07-16 PROCEDURE — 25000132 ZZH RX MED GY IP 250 OP 250 PS 637: Performed by: STUDENT IN AN ORGANIZED HEALTH CARE EDUCATION/TRAINING PROGRAM

## 2019-07-16 RX ADMIN — FENOFIBRATE 160 MG: 160 TABLET, FILM COATED ORAL at 20:36

## 2019-07-16 RX ADMIN — DIVALPROEX SODIUM 1000 MG: 500 TABLET, FILM COATED, EXTENDED RELEASE ORAL at 08:36

## 2019-07-16 RX ADMIN — MELATONIN 2000 UNITS: at 08:36

## 2019-07-16 RX ADMIN — CLOZAPINE 450 MG: 100 TABLET ORAL at 20:36

## 2019-07-16 RX ADMIN — THERA TABS 1 TABLET: TAB at 08:36

## 2019-07-16 RX ADMIN — Medication 1 G: at 08:36

## 2019-07-16 RX ADMIN — PANTOPRAZOLE SODIUM 20 MG: 20 TABLET, DELAYED RELEASE ORAL at 08:36

## 2019-07-16 RX ADMIN — Medication 1 G: at 20:36

## 2019-07-16 RX ADMIN — METFORMIN HYDROCHLORIDE 1000 MG: 500 TABLET, FILM COATED ORAL at 17:45

## 2019-07-16 RX ADMIN — METFORMIN HYDROCHLORIDE 1000 MG: 500 TABLET, FILM COATED ORAL at 08:35

## 2019-07-16 RX ADMIN — POLYETHYLENE GLYCOL 3350 17 G: 17 POWDER, FOR SOLUTION ORAL at 08:35

## 2019-07-16 RX ADMIN — DOCUSATE SODIUM 100 MG: 100 CAPSULE, LIQUID FILLED ORAL at 20:36

## 2019-07-16 RX ADMIN — SIMVASTATIN 40 MG: 20 TABLET, FILM COATED ORAL at 20:36

## 2019-07-16 RX ADMIN — NIACIN 500 MG: 500 TABLET, FILM COATED, EXTENDED RELEASE ORAL at 20:36

## 2019-07-16 RX ADMIN — ESCITALOPRAM OXALATE 20 MG: 20 TABLET ORAL at 08:36

## 2019-07-16 RX ADMIN — ARIPIPRAZOLE 10 MG: 10 TABLET ORAL at 08:36

## 2019-07-16 RX ADMIN — CLOZAPINE 200 MG: 100 TABLET ORAL at 08:36

## 2019-07-16 RX ADMIN — PROPRANOLOL HYDROCHLORIDE 80 MG: 80 CAPSULE, EXTENDED RELEASE ORAL at 08:36

## 2019-07-16 RX ADMIN — DOCUSATE SODIUM 100 MG: 100 CAPSULE, LIQUID FILLED ORAL at 08:35

## 2019-07-16 RX ADMIN — DIVALPROEX SODIUM 1000 MG: 500 TABLET, FILM COATED, EXTENDED RELEASE ORAL at 20:36

## 2019-07-16 ASSESSMENT — ACTIVITIES OF DAILY LIVING (ADL)
LAUNDRY: UNABLE TO COMPLETE
DRESS: INDEPENDENT
DRESS: INDEPENDENT
ORAL_HYGIENE: INDEPENDENT
HYGIENE/GROOMING: INDEPENDENT
LAUNDRY: UNABLE TO COMPLETE
HYGIENE/GROOMING: INDEPENDENT
ORAL_HYGIENE: INDEPENDENT
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
## 2019-07-16 NOTE — PROGRESS NOTES
07/15/19 2200   Behavioral Health   Hallucinations appears responding   Thinking distractable;delusional;confused   Insight denial of illness;poor   Judgement impaired   Eye Contact staring;into space   Affect blunted, flat   Mood mood is calm   Physical Appearance/Attire untidy   Hygiene neglected grooming - unclean body, hair, teeth   Suicidality other (see comments)  (Denies)   1. Wish to be Dead No   2. Non-Specific Active Suicidal Thoughts  No   Self Injury other (see comment)  (Denies\)   Activity withdrawn;isolative   Speech pressured   Medication Sensitivity no observed side effects;no stated side effects   Psychomotor / Gait balanced;steady       Pt. Was appropriate with staff and peers throughout shift. Pt. Eating habits are standard. Hygiene is poor. Pt. Is secluded to room and was appropriately reacting to those restrictions. Pt. Had no violent or sexual outburst on shift and was polite while given direction from staff.
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
## 2019-07-17 LAB
BASOPHILS # BLD AUTO: 0.1 10E9/L (ref 0–0.2)
BASOPHILS NFR BLD AUTO: 0.5 %
DIFFERENTIAL METHOD BLD: ABNORMAL
EOSINOPHIL # BLD AUTO: 0.2 10E9/L (ref 0–0.7)
EOSINOPHIL NFR BLD AUTO: 1.9 %
GLUCOSE BLDC GLUCOMTR-MCNC: 120 MG/DL (ref 70–99)
IMM GRANULOCYTES # BLD: 0.1 10E9/L (ref 0–0.4)
IMM GRANULOCYTES NFR BLD: 0.7 %
LYMPHOCYTES # BLD AUTO: 4 10E9/L (ref 0.8–5.3)
LYMPHOCYTES NFR BLD AUTO: 32.9 %
MONOCYTES # BLD AUTO: 1 10E9/L (ref 0–1.3)
MONOCYTES NFR BLD AUTO: 8.3 %
NEUTROPHILS # BLD AUTO: 6.8 10E9/L (ref 1.6–8.3)
NEUTROPHILS NFR BLD AUTO: 55.7 %
NRBC # BLD AUTO: 0 10*3/UL
NRBC BLD AUTO-RTO: 0 /100
WBC # BLD AUTO: 12.1 10E9/L (ref 4–11)

## 2019-07-17 PROCEDURE — 99232 SBSQ HOSP IP/OBS MODERATE 35: CPT | Mod: GC | Performed by: PSYCHIATRY & NEUROLOGY

## 2019-07-17 PROCEDURE — 25000132 ZZH RX MED GY IP 250 OP 250 PS 637: Performed by: PSYCHIATRY & NEUROLOGY

## 2019-07-17 PROCEDURE — 25000128 H RX IP 250 OP 636: Performed by: PSYCHIATRY & NEUROLOGY

## 2019-07-17 PROCEDURE — 85048 AUTOMATED LEUKOCYTE COUNT: CPT | Performed by: PSYCHIATRY & NEUROLOGY

## 2019-07-17 PROCEDURE — 12400001 ZZH R&B MH UMMC

## 2019-07-17 PROCEDURE — 85004 AUTOMATED DIFF WBC COUNT: CPT | Performed by: PSYCHIATRY & NEUROLOGY

## 2019-07-17 PROCEDURE — 00000146 ZZHCL STATISTIC GLUCOSE BY METER IP

## 2019-07-17 PROCEDURE — 25000132 ZZH RX MED GY IP 250 OP 250 PS 637: Performed by: STUDENT IN AN ORGANIZED HEALTH CARE EDUCATION/TRAINING PROGRAM

## 2019-07-17 PROCEDURE — 36415 COLL VENOUS BLD VENIPUNCTURE: CPT | Performed by: PSYCHIATRY & NEUROLOGY

## 2019-07-17 RX ORDER — DIVALPROEX SODIUM 500 MG/1
1500 TABLET, EXTENDED RELEASE ORAL 2 TIMES DAILY
Status: DISCONTINUED | OUTPATIENT
Start: 2019-07-17 | End: 2019-08-27 | Stop reason: HOSPADM

## 2019-07-17 RX ADMIN — ESCITALOPRAM OXALATE 20 MG: 20 TABLET ORAL at 09:45

## 2019-07-17 RX ADMIN — POLYETHYLENE GLYCOL 3350 17 G: 17 POWDER, FOR SOLUTION ORAL at 09:45

## 2019-07-17 RX ADMIN — PANTOPRAZOLE SODIUM 20 MG: 20 TABLET, DELAYED RELEASE ORAL at 09:45

## 2019-07-17 RX ADMIN — THERA TABS 1 TABLET: TAB at 09:45

## 2019-07-17 RX ADMIN — FENOFIBRATE 160 MG: 160 TABLET, FILM COATED ORAL at 19:11

## 2019-07-17 RX ADMIN — NIACIN 500 MG: 500 TABLET, FILM COATED, EXTENDED RELEASE ORAL at 19:11

## 2019-07-17 RX ADMIN — DIVALPROEX SODIUM 1000 MG: 500 TABLET, FILM COATED, EXTENDED RELEASE ORAL at 09:45

## 2019-07-17 RX ADMIN — DOCUSATE SODIUM 100 MG: 100 CAPSULE, LIQUID FILLED ORAL at 19:11

## 2019-07-17 RX ADMIN — CLOZAPINE 450 MG: 100 TABLET ORAL at 19:11

## 2019-07-17 RX ADMIN — SIMVASTATIN 40 MG: 20 TABLET, FILM COATED ORAL at 19:11

## 2019-07-17 RX ADMIN — METFORMIN HYDROCHLORIDE 1000 MG: 500 TABLET, FILM COATED ORAL at 19:11

## 2019-07-17 RX ADMIN — ARIPIPRAZOLE 10 MG: 10 TABLET ORAL at 09:45

## 2019-07-17 RX ADMIN — DIVALPROEX SODIUM 1500 MG: 500 TABLET, FILM COATED, EXTENDED RELEASE ORAL at 19:11

## 2019-07-17 RX ADMIN — METFORMIN HYDROCHLORIDE 1000 MG: 500 TABLET, FILM COATED ORAL at 09:45

## 2019-07-17 RX ADMIN — CLOZAPINE 200 MG: 100 TABLET ORAL at 09:45

## 2019-07-17 RX ADMIN — Medication 1 G: at 19:11

## 2019-07-17 RX ADMIN — Medication 1 G: at 09:45

## 2019-07-17 RX ADMIN — DOCUSATE SODIUM 100 MG: 100 CAPSULE, LIQUID FILLED ORAL at 09:45

## 2019-07-17 RX ADMIN — PROPRANOLOL HYDROCHLORIDE 80 MG: 80 CAPSULE, EXTENDED RELEASE ORAL at 09:45

## 2019-07-17 RX ADMIN — MELATONIN 2000 UNITS: at 09:45

## 2019-07-17 RX ADMIN — OLANZAPINE 10 MG: 10 INJECTION, POWDER, FOR SOLUTION INTRAMUSCULAR at 08:35

## 2019-07-17 ASSESSMENT — ACTIVITIES OF DAILY LIVING (ADL)
HYGIENE/GROOMING: PROMPTS
DRESS: SCRUBS (BEHAVIORAL HEALTH)
ORAL_HYGIENE: PROMPTS
LAUNDRY: UNABLE TO COMPLETE
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
## 2019-07-17 NOTE — PROVIDER NOTIFICATION
"Pt reports \"I see my Dad watching TV, he is the prize winner of a little girl, I am attracted to others, my thoughts are the prize,\" pt says with no further details when asked about his behavior and what prompted the impulsive physical lunging and intrusiveness toward staff and if he was hearing or seeing things others might not be;      07/17/19 0835   Leadership   Seclus/Restraint >12H or 2x/24H Notified   Education   Discontinuation Criteria Cessation of behavior that precipitated seclusion or restraint   Criteria Explained Yes   Patient's Response DU   Family Notification D   Restraint Monitoring Q15 Minutes   Psychological Status O  (cooperative with staff during IM)   Physical Comfort D;Other  (denies pain)   Circulation NS   Continuous Observation Yes   Restraint Type   Wrist - R (BH) Discontinued   Wrist - L (BH) Discontinued   Ankle - R (BH) Discontinued   Ankle - L (BH) Discontinued   Chest (BH) Discontinued   Other (Comment) Discontinued  (only hands on discontinued following IM)     "

## 2019-07-17 NOTE — PROVIDER NOTIFICATION
07/17/19 0830   Seclusion or Restraint Order   Length of Order 4   Order Obtained Yes   Attending Physician Notified MD ordered restraint   Attending Physician's Name Desosier   Assessment   Less Restrictive Alternative Immediate action required, no least restrictive measures could be attempted   Restraint Monitoring Q15 Minutes   Psychological Status O  (lying prone on bed/tense)   Restraint Type   Wrist - R (BH) Start   Wrist - L (BH) Start   Ankle - R (BH) Start   Ankle - L (BH) Start   Chest (BH) Start     While SIO staff assist patient with getting food for breakfast, 0828- Pt lunged toward staff aggressively per SIO verbal report; code 21 called for support and pt assisted to bed at 0830 and held for safety-pt declines oral medication and receives IM Zyprexa 10 mg /right upper outer Quadrant of gluteal at 0835 by assisting RN without incident; pt agrees to stay in room take time for a while and closely monitored thru window of room while on SIO; No official 5 point restraint or seclusion needed; Attending provider notified at 0900 am, restraint order and hands on only for IM-No need to initiate 5 point restraints or seclusion;    o further aggression or SIB.

## 2019-07-17 NOTE — PROGRESS NOTES
"Brayden was up ad joyce, spent the evening shift in his room. Writer spoke with pt from the doorway to his room while Pt was laying in bed. Brayden gave short answers, looked directly at writer while speaking.  Pt denied feeling anxious, denied experiencing auditory or visual hallucinations. Pt denied having suicidal ideation or thoughts of harming himself or others. Pt reported, \"I guess I'm feeling a little depressed\". Pt was med adherent.   "

## 2019-07-18 LAB
GLUCOSE BLDC GLUCOMTR-MCNC: 124 MG/DL (ref 70–99)
GLUCOSE BLDC GLUCOMTR-MCNC: 130 MG/DL (ref 70–99)

## 2019-07-18 PROCEDURE — 25000132 ZZH RX MED GY IP 250 OP 250 PS 637: Performed by: PSYCHIATRY & NEUROLOGY

## 2019-07-18 PROCEDURE — 12400001 ZZH R&B MH UMMC

## 2019-07-18 PROCEDURE — 00000146 ZZHCL STATISTIC GLUCOSE BY METER IP

## 2019-07-18 PROCEDURE — 25000132 ZZH RX MED GY IP 250 OP 250 PS 637: Performed by: STUDENT IN AN ORGANIZED HEALTH CARE EDUCATION/TRAINING PROGRAM

## 2019-07-18 RX ADMIN — FENOFIBRATE 160 MG: 160 TABLET, FILM COATED ORAL at 20:15

## 2019-07-18 RX ADMIN — Medication 1 G: at 08:33

## 2019-07-18 RX ADMIN — POLYETHYLENE GLYCOL 3350 17 G: 17 POWDER, FOR SOLUTION ORAL at 08:32

## 2019-07-18 RX ADMIN — DIVALPROEX SODIUM 1500 MG: 500 TABLET, FILM COATED, EXTENDED RELEASE ORAL at 08:33

## 2019-07-18 RX ADMIN — Medication 1 G: at 20:15

## 2019-07-18 RX ADMIN — CLOZAPINE 450 MG: 100 TABLET ORAL at 20:15

## 2019-07-18 RX ADMIN — MELATONIN 2000 UNITS: at 08:32

## 2019-07-18 RX ADMIN — METFORMIN HYDROCHLORIDE 1000 MG: 500 TABLET, FILM COATED ORAL at 18:23

## 2019-07-18 RX ADMIN — DOCUSATE SODIUM 100 MG: 100 CAPSULE, LIQUID FILLED ORAL at 08:33

## 2019-07-18 RX ADMIN — NIACIN 500 MG: 500 TABLET, FILM COATED, EXTENDED RELEASE ORAL at 20:16

## 2019-07-18 RX ADMIN — PROPRANOLOL HYDROCHLORIDE 80 MG: 80 CAPSULE, EXTENDED RELEASE ORAL at 08:33

## 2019-07-18 RX ADMIN — CLOZAPINE 200 MG: 100 TABLET ORAL at 08:32

## 2019-07-18 RX ADMIN — SIMVASTATIN 40 MG: 20 TABLET, FILM COATED ORAL at 20:15

## 2019-07-18 RX ADMIN — ARIPIPRAZOLE 10 MG: 10 TABLET ORAL at 08:33

## 2019-07-18 RX ADMIN — DOCUSATE SODIUM 100 MG: 100 CAPSULE, LIQUID FILLED ORAL at 20:15

## 2019-07-18 RX ADMIN — ESCITALOPRAM OXALATE 20 MG: 20 TABLET ORAL at 08:33

## 2019-07-18 RX ADMIN — METFORMIN HYDROCHLORIDE 1000 MG: 500 TABLET, FILM COATED ORAL at 08:33

## 2019-07-18 RX ADMIN — THERA TABS 1 TABLET: TAB at 08:33

## 2019-07-18 RX ADMIN — DIVALPROEX SODIUM 1500 MG: 500 TABLET, FILM COATED, EXTENDED RELEASE ORAL at 20:15

## 2019-07-18 RX ADMIN — PANTOPRAZOLE SODIUM 20 MG: 20 TABLET, DELAYED RELEASE ORAL at 08:33

## 2019-07-18 ASSESSMENT — ACTIVITIES OF DAILY LIVING (ADL)
ORAL_HYGIENE: INDEPENDENT
LAUNDRY: WITH SUPERVISION
HYGIENE/GROOMING: INDEPENDENT
LAUNDRY: UNABLE TO COMPLETE
DRESS: INDEPENDENT
LAUNDRY: UNABLE TO COMPLETE
HYGIENE/GROOMING: INDEPENDENT;SHOWER
DRESS: INDEPENDENT
DRESS: INDEPENDENT
ORAL_HYGIENE: INDEPENDENT
ORAL_HYGIENE: INDEPENDENT
HYGIENE/GROOMING: INDEPENDENT

## 2019-07-18 NOTE — PROGRESS NOTES
"BRIEF UPDATE:  Patient had multiple lunges/attacks at staff yesterday of varying intensity; did require PRN's at one time and other times was redirectable.     Met with patient in his room. He was notably fidgeting his hands and feet and appeared to have tenser affect/more anxiety than prior days. Otherwise mental status exam stable compared to prior. He said he is still having a good appetite. Said he slept well. He was unable to state his mood when directly asked, instead saying \"I'm trying as hard as I can not to do anything wrong\".  He said he doesn't feel badly about his actions because he doesn't believe in sinning. He did endorse ongoing AH of talking to angels \"about sinning\". Denied VH. Endorses delusions of Sabianism themes as before. Denied side fx from meds. Denied SI/HI. Unable to state whether or not he still had urges to grab staff. He did say he is \"scared\" of going to the \"living room\" (I'm assuming he meant the lounge). He was unable to elaborate on what was scary about the lounge. He did say \"you guys called me on the phone and told me I should make contact with you\" which he clarified meant that we had told him that he should grab staff. He agreed to try to have a good day today and keep his hands to himself.     Lukas Blackwell MD  PGY-2 Psychiatry Resident      "

## 2019-07-18 NOTE — PROGRESS NOTES
07/18/19 1517   Behavioral Health   Hallucinations other (see comment)   Thinking other (see comment)   Orientation place: oriented;person: oriented   Memory baseline memory   Insight poor   Judgement impaired   Eye Contact at examiner   Affect blunted, flat   Mood mood is calm   Hygiene well groomed   1. Wish to be Dead No   2. Non-Specific Active Suicidal Thoughts  No   Activities of Daily Living   Hygiene/Grooming independent;shower   Oral Hygiene independent   Dress independent   Laundry unable to complete   Room Organization independent   Pt took a shower this afternoon and ate all meals without any issues. He was overall calm and did not attack anyone during this shift. It was overall a good day.

## 2019-07-19 LAB
GLUCOSE BLDC GLUCOMTR-MCNC: 119 MG/DL (ref 70–99)
GLUCOSE BLDC GLUCOMTR-MCNC: 162 MG/DL (ref 70–99)

## 2019-07-19 PROCEDURE — 00000146 ZZHCL STATISTIC GLUCOSE BY METER IP

## 2019-07-19 PROCEDURE — 25000132 ZZH RX MED GY IP 250 OP 250 PS 637: Performed by: STUDENT IN AN ORGANIZED HEALTH CARE EDUCATION/TRAINING PROGRAM

## 2019-07-19 PROCEDURE — 99232 SBSQ HOSP IP/OBS MODERATE 35: CPT | Mod: GC | Performed by: PSYCHIATRY & NEUROLOGY

## 2019-07-19 PROCEDURE — 12400001 ZZH R&B MH UMMC

## 2019-07-19 PROCEDURE — 25000132 ZZH RX MED GY IP 250 OP 250 PS 637: Performed by: PSYCHIATRY & NEUROLOGY

## 2019-07-19 PROCEDURE — 25000128 H RX IP 250 OP 636: Performed by: STUDENT IN AN ORGANIZED HEALTH CARE EDUCATION/TRAINING PROGRAM

## 2019-07-19 RX ADMIN — METFORMIN HYDROCHLORIDE 1000 MG: 500 TABLET, FILM COATED ORAL at 17:59

## 2019-07-19 RX ADMIN — CLOZAPINE 450 MG: 100 TABLET ORAL at 20:00

## 2019-07-19 RX ADMIN — MEDROXYPROGESTERONE ACETATE 300 MG: 150 INJECTION, SUSPENSION INTRAMUSCULAR at 11:42

## 2019-07-19 RX ADMIN — DIVALPROEX SODIUM 1500 MG: 500 TABLET, FILM COATED, EXTENDED RELEASE ORAL at 08:32

## 2019-07-19 RX ADMIN — CLOZAPINE 200 MG: 100 TABLET ORAL at 08:32

## 2019-07-19 RX ADMIN — Medication 1 G: at 20:00

## 2019-07-19 RX ADMIN — DIVALPROEX SODIUM 1500 MG: 500 TABLET, FILM COATED, EXTENDED RELEASE ORAL at 20:00

## 2019-07-19 RX ADMIN — PANTOPRAZOLE SODIUM 20 MG: 20 TABLET, DELAYED RELEASE ORAL at 08:32

## 2019-07-19 RX ADMIN — ESCITALOPRAM OXALATE 20 MG: 20 TABLET ORAL at 08:32

## 2019-07-19 RX ADMIN — THERA TABS 1 TABLET: TAB at 08:32

## 2019-07-19 RX ADMIN — Medication 1 G: at 08:32

## 2019-07-19 RX ADMIN — DOCUSATE SODIUM 100 MG: 100 CAPSULE, LIQUID FILLED ORAL at 08:32

## 2019-07-19 RX ADMIN — NIACIN 500 MG: 500 TABLET, FILM COATED, EXTENDED RELEASE ORAL at 20:00

## 2019-07-19 RX ADMIN — SIMVASTATIN 40 MG: 20 TABLET, FILM COATED ORAL at 20:00

## 2019-07-19 RX ADMIN — FENOFIBRATE 160 MG: 160 TABLET, FILM COATED ORAL at 20:00

## 2019-07-19 RX ADMIN — ARIPIPRAZOLE 10 MG: 10 TABLET ORAL at 08:32

## 2019-07-19 RX ADMIN — METFORMIN HYDROCHLORIDE 1000 MG: 500 TABLET, FILM COATED ORAL at 08:32

## 2019-07-19 RX ADMIN — POLYETHYLENE GLYCOL 3350 17 G: 17 POWDER, FOR SOLUTION ORAL at 08:33

## 2019-07-19 RX ADMIN — DOCUSATE SODIUM 100 MG: 100 CAPSULE, LIQUID FILLED ORAL at 20:00

## 2019-07-19 RX ADMIN — PROPRANOLOL HYDROCHLORIDE 80 MG: 80 CAPSULE, EXTENDED RELEASE ORAL at 08:32

## 2019-07-19 RX ADMIN — MELATONIN 2000 UNITS: at 08:31

## 2019-07-19 ASSESSMENT — ACTIVITIES OF DAILY LIVING (ADL)
ORAL_HYGIENE: INDEPENDENT
DRESS: INDEPENDENT
HYGIENE/GROOMING: INDEPENDENT

## 2019-07-19 NOTE — PLAN OF CARE
BEHAVIORAL TEAM DISCUSSION    Participants: dr bonilla, resident dr langston, tucker gorman rn, christi coleman [facility]  Progress: Pt seems to have made some progress.   Although he has had instances this past week of lunging at staff, the number of incidents may be fewer and less intense.   He often apologizes for these actions.    He often says he engaged in them because he thought people were giving him dirty looks or talking about him..   He appears tense.  He says he's uncomfortable in the lounge.   He refers to talking with angels and talking to people on TV.    Needs ADL prompts.  Continued Stay Criteria/Rationale:  due to above  Medical/Physical: no new issues  Precautions:   Behavioral Orders   Procedures    Assault precautions    Code 1 - Restrict to Unit    Routine Programming     As clinically indicated    Sexual precautions    Status 15     Every 15 minutes.    Status Individual Observation     Male only.  10 feet space restriction from peers and staff. Staff to stay 10 ft when patient in hallway. OK to sit at door when patient in room.     Order Specific Question:   CONTINUOUS 24 hours / day     Answer:   Other     Order Specific Question:   Specify distance     Answer:   Male only.  10 feet space restriction from peers and staff. Staff to stay 10 ft when patient in hallway. OK to sit at door when patient in room.     Order Specific Question:   Indications for SIO     Answer:   Assault risk     Order Specific Question:   Indications for SIO     Answer:   Severe intrusiveness     Plan:  Meds per doctors.   Return to group home.   May need new outpatient psychiatrist.  Rationale for change in precautions or plan:  no change at this time.

## 2019-07-19 NOTE — PROGRESS NOTES
Brayden was isolative to his room. No aggressive or agitation towards staff.  Pt makes delusional statements during conversation. Insight into mental health remains poor. Pt remains medication compliant and denied SE s. Pt took a shower during the day shift.

## 2019-07-19 NOTE — PROGRESS NOTES
Pt kept to himself throughout shift. Pt spent majority of shift laying in bed. Pt did not need to be redirected during shift. Pt was respectful to staff.      07/18/19 3476   Behavioral Health   Hallucinations denies / not responding to hallucinations   Thinking poor concentration   Orientation place: oriented;date: oriented;person: oriented   Memory baseline memory   Insight poor   Judgement impaired   Eye Contact at examiner   Affect blunted, flat   Mood mood is calm   Physical Appearance/Attire attire appropriate to age and situation   Suicidality other (see comments)  (Non stated)   1. Wish to be Dead No   2. Non-Specific Active Suicidal Thoughts  No   Activity isolative;withdrawn   Speech clear;coherent   Psychomotor / Gait balanced;steady   Activities of Daily Living   Hygiene/Grooming independent   Oral Hygiene independent   Dress independent   Laundry with supervision   Room Organization independent
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
## 2019-07-20 LAB — GLUCOSE BLDC GLUCOMTR-MCNC: 129 MG/DL (ref 70–99)

## 2019-07-20 PROCEDURE — 25000132 ZZH RX MED GY IP 250 OP 250 PS 637: Performed by: PSYCHIATRY & NEUROLOGY

## 2019-07-20 PROCEDURE — 00000146 ZZHCL STATISTIC GLUCOSE BY METER IP

## 2019-07-20 PROCEDURE — 25000132 ZZH RX MED GY IP 250 OP 250 PS 637: Performed by: STUDENT IN AN ORGANIZED HEALTH CARE EDUCATION/TRAINING PROGRAM

## 2019-07-20 PROCEDURE — 12400001 ZZH R&B MH UMMC

## 2019-07-20 RX ADMIN — CLOZAPINE 450 MG: 100 TABLET ORAL at 19:22

## 2019-07-20 RX ADMIN — PROPRANOLOL HYDROCHLORIDE 80 MG: 80 CAPSULE, EXTENDED RELEASE ORAL at 08:39

## 2019-07-20 RX ADMIN — POLYETHYLENE GLYCOL 3350 17 G: 17 POWDER, FOR SOLUTION ORAL at 08:39

## 2019-07-20 RX ADMIN — THERA TABS 1 TABLET: TAB at 08:40

## 2019-07-20 RX ADMIN — DIVALPROEX SODIUM 1500 MG: 500 TABLET, FILM COATED, EXTENDED RELEASE ORAL at 19:22

## 2019-07-20 RX ADMIN — METFORMIN HYDROCHLORIDE 1000 MG: 500 TABLET, FILM COATED ORAL at 19:23

## 2019-07-20 RX ADMIN — NIACIN 500 MG: 500 TABLET, FILM COATED, EXTENDED RELEASE ORAL at 19:22

## 2019-07-20 RX ADMIN — ARIPIPRAZOLE 10 MG: 10 TABLET ORAL at 08:40

## 2019-07-20 RX ADMIN — FENOFIBRATE 160 MG: 160 TABLET, FILM COATED ORAL at 19:22

## 2019-07-20 RX ADMIN — CLOZAPINE 200 MG: 100 TABLET ORAL at 08:39

## 2019-07-20 RX ADMIN — SIMVASTATIN 40 MG: 20 TABLET, FILM COATED ORAL at 19:22

## 2019-07-20 RX ADMIN — PANTOPRAZOLE SODIUM 20 MG: 20 TABLET, DELAYED RELEASE ORAL at 08:40

## 2019-07-20 RX ADMIN — Medication 1 G: at 08:39

## 2019-07-20 RX ADMIN — MELATONIN 2000 UNITS: at 08:40

## 2019-07-20 RX ADMIN — Medication 1 G: at 19:23

## 2019-07-20 RX ADMIN — ESCITALOPRAM OXALATE 20 MG: 20 TABLET ORAL at 08:39

## 2019-07-20 RX ADMIN — METFORMIN HYDROCHLORIDE 1000 MG: 500 TABLET, FILM COATED ORAL at 08:40

## 2019-07-20 RX ADMIN — DOCUSATE SODIUM 100 MG: 100 CAPSULE, LIQUID FILLED ORAL at 08:40

## 2019-07-20 RX ADMIN — DIVALPROEX SODIUM 1500 MG: 500 TABLET, FILM COATED, EXTENDED RELEASE ORAL at 08:40

## 2019-07-20 RX ADMIN — DOCUSATE SODIUM 100 MG: 100 CAPSULE, LIQUID FILLED ORAL at 19:23

## 2019-07-20 ASSESSMENT — ACTIVITIES OF DAILY LIVING (ADL)
DRESS: SCRUBS (BEHAVIORAL HEALTH)
HYGIENE/GROOMING: PROMPTS
ORAL_HYGIENE: PROMPTS
HYGIENE/GROOMING: INDEPENDENT
DRESS: SCRUBS (BEHAVIORAL HEALTH)
LAUNDRY: UNABLE TO COMPLETE
LAUNDRY: UNABLE TO COMPLETE
ORAL_HYGIENE: INDEPENDENT

## 2019-07-20 NOTE — PLAN OF CARE
Pt remains on SIO staffing and space restriction from peers due to impulsive aggression and sexually inappropriate behaviors. He has been isolative to his room all day. Declining shower and ADLs when encouraged. Withdrawn on approach, and provides short answers to questions during check in. He has been compliant with scheduled medications and denies any side effects. Will continue to monitor closely

## 2019-07-20 NOTE — PROGRESS NOTES
Pt was in room for the entire shift and refused to shower multiple times when asked. Pt demonstrated no aggression or anger but made some delusional comments and has difficulty giving a relevant answer to questions he is asked. Pt also seemed to describe hearing voices this shift.

## 2019-07-21 LAB — GLUCOSE BLDC GLUCOMTR-MCNC: 117 MG/DL (ref 70–99)

## 2019-07-21 PROCEDURE — 12400001 ZZH R&B MH UMMC

## 2019-07-21 PROCEDURE — 00000146 ZZHCL STATISTIC GLUCOSE BY METER IP

## 2019-07-21 PROCEDURE — 25000132 ZZH RX MED GY IP 250 OP 250 PS 637: Performed by: STUDENT IN AN ORGANIZED HEALTH CARE EDUCATION/TRAINING PROGRAM

## 2019-07-21 PROCEDURE — 25000132 ZZH RX MED GY IP 250 OP 250 PS 637: Performed by: PSYCHIATRY & NEUROLOGY

## 2019-07-21 RX ADMIN — DIVALPROEX SODIUM 1500 MG: 500 TABLET, FILM COATED, EXTENDED RELEASE ORAL at 08:07

## 2019-07-21 RX ADMIN — DOCUSATE SODIUM 100 MG: 100 CAPSULE, LIQUID FILLED ORAL at 08:08

## 2019-07-21 RX ADMIN — ESCITALOPRAM OXALATE 20 MG: 20 TABLET ORAL at 08:08

## 2019-07-21 RX ADMIN — MELATONIN 2000 UNITS: at 08:07

## 2019-07-21 RX ADMIN — METFORMIN HYDROCHLORIDE 1000 MG: 500 TABLET, FILM COATED ORAL at 18:01

## 2019-07-21 RX ADMIN — METFORMIN HYDROCHLORIDE 1000 MG: 500 TABLET, FILM COATED ORAL at 08:07

## 2019-07-21 RX ADMIN — ARIPIPRAZOLE 10 MG: 10 TABLET ORAL at 08:07

## 2019-07-21 RX ADMIN — NIACIN 500 MG: 500 TABLET, FILM COATED, EXTENDED RELEASE ORAL at 20:18

## 2019-07-21 RX ADMIN — PANTOPRAZOLE SODIUM 20 MG: 20 TABLET, DELAYED RELEASE ORAL at 08:08

## 2019-07-21 RX ADMIN — Medication 1 G: at 08:08

## 2019-07-21 RX ADMIN — DOCUSATE SODIUM 100 MG: 100 CAPSULE, LIQUID FILLED ORAL at 20:18

## 2019-07-21 RX ADMIN — DIVALPROEX SODIUM 1500 MG: 500 TABLET, FILM COATED, EXTENDED RELEASE ORAL at 20:18

## 2019-07-21 RX ADMIN — FENOFIBRATE 160 MG: 160 TABLET, FILM COATED ORAL at 20:18

## 2019-07-21 RX ADMIN — SIMVASTATIN 40 MG: 20 TABLET, FILM COATED ORAL at 20:18

## 2019-07-21 RX ADMIN — CLOZAPINE 450 MG: 100 TABLET ORAL at 20:18

## 2019-07-21 RX ADMIN — PROPRANOLOL HYDROCHLORIDE 80 MG: 80 CAPSULE, EXTENDED RELEASE ORAL at 08:08

## 2019-07-21 RX ADMIN — CLOZAPINE 200 MG: 100 TABLET ORAL at 08:08

## 2019-07-21 RX ADMIN — THERA TABS 1 TABLET: TAB at 08:07

## 2019-07-21 RX ADMIN — Medication 1 G: at 20:18

## 2019-07-21 RX ADMIN — POLYETHYLENE GLYCOL 3350 17 G: 17 POWDER, FOR SOLUTION ORAL at 08:08

## 2019-07-21 ASSESSMENT — ACTIVITIES OF DAILY LIVING (ADL)
HYGIENE/GROOMING: PROMPTS
DRESS: SCRUBS (BEHAVIORAL HEALTH)
LAUNDRY: UNABLE TO COMPLETE
ORAL_HYGIENE: PROMPTS

## 2019-07-21 NOTE — PROGRESS NOTES
Patient was calm, no verbal or physical outbursts. Patient ate all meals. No seclusion or restraints. No visitors this evening.        07/20/19 2100   Behavioral Health   Thinking poor concentration   Orientation person: oriented;place: oriented   Insight poor   Judgement impaired   Eye Contact at examiner   Affect blunted, flat   Mood mood is calm   Physical Appearance/Attire untidy;disheveled   Hygiene neglected grooming - unclean body, hair, teeth   1. Wish to be Dead No   2. Non-Specific Active Suicidal Thoughts  No   Self Injury other (see comment)  (pt denies)   Elopement   (no attempts to elope)   Activity withdrawn   Speech clear;coherent   Medication Sensitivity no stated side effects;no observed side effects   Psychomotor / Gait balanced;steady   Activities of Daily Living   Hygiene/Grooming independent   Oral Hygiene independent   Dress scrubs (behavioral health)   Laundry unable to complete   Room Organization independent   Activity   Activity Assistance Provided independent

## 2019-07-22 LAB — GLUCOSE BLDC GLUCOMTR-MCNC: 123 MG/DL (ref 70–99)

## 2019-07-22 PROCEDURE — 25000132 ZZH RX MED GY IP 250 OP 250 PS 637: Performed by: PSYCHIATRY & NEUROLOGY

## 2019-07-22 PROCEDURE — 12400001 ZZH R&B MH UMMC

## 2019-07-22 PROCEDURE — 99232 SBSQ HOSP IP/OBS MODERATE 35: CPT | Mod: GC | Performed by: PSYCHIATRY & NEUROLOGY

## 2019-07-22 PROCEDURE — 25000132 ZZH RX MED GY IP 250 OP 250 PS 637: Performed by: STUDENT IN AN ORGANIZED HEALTH CARE EDUCATION/TRAINING PROGRAM

## 2019-07-22 PROCEDURE — 00000146 ZZHCL STATISTIC GLUCOSE BY METER IP

## 2019-07-22 RX ADMIN — THERA TABS 1 TABLET: TAB at 08:51

## 2019-07-22 RX ADMIN — DOCUSATE SODIUM 100 MG: 100 CAPSULE, LIQUID FILLED ORAL at 19:29

## 2019-07-22 RX ADMIN — DOCUSATE SODIUM 100 MG: 100 CAPSULE, LIQUID FILLED ORAL at 08:52

## 2019-07-22 RX ADMIN — PANTOPRAZOLE SODIUM 20 MG: 20 TABLET, DELAYED RELEASE ORAL at 08:52

## 2019-07-22 RX ADMIN — PROPRANOLOL HYDROCHLORIDE 80 MG: 80 CAPSULE, EXTENDED RELEASE ORAL at 09:04

## 2019-07-22 RX ADMIN — MELATONIN 2000 UNITS: at 08:51

## 2019-07-22 RX ADMIN — ARIPIPRAZOLE 10 MG: 10 TABLET ORAL at 08:51

## 2019-07-22 RX ADMIN — Medication 1 G: at 19:29

## 2019-07-22 RX ADMIN — METFORMIN HYDROCHLORIDE 1000 MG: 500 TABLET, FILM COATED ORAL at 17:36

## 2019-07-22 RX ADMIN — Medication 1 G: at 08:52

## 2019-07-22 RX ADMIN — DIVALPROEX SODIUM 1500 MG: 500 TABLET, FILM COATED, EXTENDED RELEASE ORAL at 08:51

## 2019-07-22 RX ADMIN — POLYETHYLENE GLYCOL 3350 17 G: 17 POWDER, FOR SOLUTION ORAL at 08:52

## 2019-07-22 RX ADMIN — CLOZAPINE 200 MG: 100 TABLET ORAL at 08:51

## 2019-07-22 RX ADMIN — SIMVASTATIN 40 MG: 20 TABLET, FILM COATED ORAL at 19:29

## 2019-07-22 RX ADMIN — DIVALPROEX SODIUM 1500 MG: 500 TABLET, FILM COATED, EXTENDED RELEASE ORAL at 19:29

## 2019-07-22 RX ADMIN — CLOZAPINE 450 MG: 100 TABLET ORAL at 19:27

## 2019-07-22 RX ADMIN — METFORMIN HYDROCHLORIDE 1000 MG: 500 TABLET, FILM COATED ORAL at 08:52

## 2019-07-22 RX ADMIN — ESCITALOPRAM OXALATE 20 MG: 20 TABLET ORAL at 08:51

## 2019-07-22 RX ADMIN — NIACIN 500 MG: 500 TABLET, FILM COATED, EXTENDED RELEASE ORAL at 19:29

## 2019-07-22 RX ADMIN — FENOFIBRATE 160 MG: 160 TABLET, FILM COATED ORAL at 19:29

## 2019-07-22 ASSESSMENT — ACTIVITIES OF DAILY LIVING (ADL)
LAUNDRY: UNABLE TO COMPLETE
HYGIENE/GROOMING: INDEPENDENT
LAUNDRY: UNABLE TO COMPLETE
DRESS: SCRUBS (BEHAVIORAL HEALTH)
ORAL_HYGIENE: INDEPENDENT
HYGIENE/GROOMING: INDEPENDENT
ORAL_HYGIENE: INDEPENDENT
DRESS: SCRUBS (BEHAVIORAL HEALTH)

## 2019-07-22 NOTE — PROGRESS NOTES
Patient's room was moved to N137 to help provide a calmer environment with less stimulation in the hallways and lounge. Patient showered and moved rooms, tolerated both well, no episodes of aggressiveness or inappropriateness. Lab attempted to get Depakote level from patient in AM and was unable to draw it, patient was fully cooperative. Writer spoke with MD and approved lab draw to be re-tried tomorrow AM, lab aware.    Tiffanie from patient's group home called to check in, apologized for not coming to see him and reported she was unsure if they would be able to come in this week. Tiffanie reported that Brayden likes to sing songs, listen to music, and talk about cars for ideas for things to do to keep him active. She reported they try to dissuade him from drawing as it usually causes him to focus on sexual content. Will continue to monitor for safety.

## 2019-07-22 NOTE — PROGRESS NOTES
"[facility], [place]   Psychiatric Progress Note  Brayden Adrian  [id number]  07/19/19    Chief Complaint: Continued medical care        Interim History:     The patient's care was discussed with the treatment team during the daily team meeting and/or staff's chart notes were reviewed.  Staff report patient was isolative to his room over the weekend. He did not have further aggressive or sexual behaviors. He has been less willing to shower when staff attempt to have him shower. Discussed during team meeting that patient may benefit from transferring rooms to the other side of the unit so that he is further away from the other patient on his unit who has high risk for behavioral agitation, and maybe this will help him feel comfortable in the lounge. RN verbal report that lab did not draw VPA level this morning due to being unable to get a good stick.     Met with patient in his room. Informed patient he did a great job keeping his hands to himself this weekend. He appeared pleased to hear this. He said he has been \"staying in bed too much\" but then said that he didn't want to go to the lounge because \"I don't feel safe in the lounge\" but he could not elaborate on why he does not feel safe there. He said his mood is \"so-so\" and \"depressed feelings\" because he is \"seeing evil people staring at me\" which he clarified is just in his imagination but not a visual hallucination. He said his appetite is still strong. When asked about AH, he said \"I thought God was in the karmen over there\" but was unable to clarify if he was actually having recent AH. He did endorse VH of seeing his mother and father underneath his bed. When asked about current sexual urges he said \"I've touched little girls before, I'm sorry\" and did not clarify further. He said he does not feel safe even in his room, as he is unsure if people are going to hurt him. Informed him that staff would be nice to him, and as long as he " "kept his hands to himself, they would not touch him. When asked about SI he again said \"I punched myself in the nose\" and \"I attached a prize to my spirit\"; however no evidence of bruising or marks was observed. He stated the medications were going OK and denied side effects. He said his last BM was two days ago and was normal. He had no further questions or concerns.          Medications:       ARIPiprazole  10 mg Oral Daily     cloZAPine  200 mg Oral QAM     cloZAPine  450 mg Oral At Bedtime     DIADERM FOOT REJUVENATING   Apply externally Daily     divalproex sodium extended-release  1,500 mg Oral BID     docusate sodium  100 mg Oral BID     escitalopram  20 mg Oral Daily     fenofibrate  160 mg Oral QPM     fish oil-omega-3 fatty acids  1 g Oral BID     medroxyPROGESTERone  300 mg Intramuscular Q7 Days     metFORMIN  1,000 mg Oral BID w/meals     multivitamin, therapeutic  1 tablet Oral Daily     niacin ER  500 mg Oral At Bedtime     pantoprazole  20 mg Oral Daily     polyethylene glycol  17 g Oral Daily     propranolol ER  80 mg Oral Daily     simvastatin  40 mg Oral At Bedtime     vitamin D3  2,000 Units Oral Daily          Allergies:     Allergies   Allergen Reactions     Haldol [Haloperidol] Other (See Comments)     Increases pt's hallucinations           Labs:     Recent Results (from the past 24 hour(s))   Glucose by meter    Collection Time: 07/22/19  8:05 AM   Result Value Ref Range    Glucose 123 (H) 70 - 99 mg/dL          Psychiatric Examination:     /82   Pulse 99   Temp 98.3  F (36.8  C) (Tympanic)   Resp 14   Ht 1.753 m (5' 9\")   Wt 101.6 kg (224 lb)   SpO2 98%   BMI 33.08 kg/m    Weight is 224 lbs 0 oz  Body mass index is 33.08 kg/m .  Lying Orthostatic BP: 121/74      Lying Orthostatic Pulse: 89 bpm      Sitting Orthostatic BP: 114/80(102)      Sitting Orthostatic Pulse: 90 bpm      Standing Orthostatic BP: 119/42      Standing Orthostatic Pulse: 97 bpm       Weight over " "time:  Vitals:    06/26/19 1527 06/26/19 2102   Weight: 94.8 kg (209 lb) 101.6 kg (224 lb)       Orthostatic Vitals     None            Cardiometabolic risk assessment. 07/12/19      Reviewed patient profile for cardiometabolic risk factors    Date taken /Value  REFERENCE RANGE   Abdominal Obesity  (Waist Circumference)   See nursing flowsheet Women ?35 in (88 cm)   Men ?40 in (102 cm)      Triglycerides  No results found for: TRIG    ?150 mg/dL (1.7 mmol/L) or current treatment for elevated triglycerides   HDL cholesterol  No results found for: HDL]   Women <50 mg/dL (1.3 mmol/L) in women or current treatment for low HDL cholesterol  Men <40 mg/dL (1 mmol/L) in men or current treatment for low HDL cholesterol     Fasting plasma glucose (FPG) Lab Results   Component Value Date     06/26/2019      FPG ?100 mg/dL (5.6 mmol/L) or treatment for elevated blood glucose   Blood pressure  BP Readings from Last 3 Encounters:   07/22/19 124/82   04/02/19 110/73    Blood pressure ?130/85 mmHg or treatment for elevated blood pressure   Family History  See family history   MSE:   Appearance: Awake, alert, calm, sitting in bed, no acute distress  Attitude: Cooperative.  Eye Contact: Reduced  Mood: \"so-so\" and \"depressed feelings\"  Affect: Subdued and flattened, blunted reactivity, range restricted.  Speech: Soft, slow. Mildly increased speech latency.   Psychomotor Behavior: Tardive dyskinesia present. Fidgeting of extremities. Appears somewhat restless, appears akathetic. No other psychomotor slowing or agitation present.   Thought Process: Slowed/delayed responses. Had difficulty answering direct questions at times, consistent with prior days.   Associations: No apparent loosening of associations.   Thought Content: Endorses AH of \"I thought God was in the karmen over there\" unclear if this means he had AH or not, he was unable to clarify. Denied AH of speaking to angels. Endorsed VH of seeing his parents underneath his " "bed, unclear when this occurred.  Endorses delusions with Christian themes consistent with previous days. Denied HI, unclear if endorsed urge to sexually assault staff today. When asked about SI stated \"punched myself in the nose\" but again no evidence of this was observed.   Insight: Poor   Judgment: Poor  Orientation: appears grossly intact  Attention Span and Concentration: Appears mostly intact, does answer direct questions appropriately.   Recent and Remote Memory: Appears somewhat impaired due to unreliability in recounts of recent events.   Language: Fluent and conversant in English.    Fund of Knowledge: Appears below average.   Muscle Strength and Tone: Normal   Gait and Station: Did not assess as patient was bed bound.           Precautions:     Behavioral Orders   Procedures     Assault precautions     Code 1 - Restrict to Unit     Routine Programming     As clinically indicated     Sexual precautions     Status 15     Every 15 minutes.     Status Individual Observation     Male only.  10 feet space restriction from peers and staff. Staff to stay 10 ft when patient in hallway. OK to sit at door when patient in room.     Order Specific Question:   CONTINUOUS 24 hours / day     Answer:   Other     Order Specific Question:   Specify distance     Answer:   Male only.  10 feet space restriction from peers and staff. Staff to stay 10 ft when patient in hallway. OK to sit at door when patient in room.     Order Specific Question:   Indications for SIO     Answer:   Assault risk     Order Specific Question:   Indications for SIO     Answer:   Severe intrusiveness          DIagnoses:     Schizoaffective disorder bipolar type  Mild intellectual disability  History of Tardive Dyskinesia  History of TBI  Polysubstance use disorder, in remission         Assessment & Plan:   Brayden did not have further behavioral incidents over the weekend. He has been very isolative to his room. He has been offered to trial sitting " in the lounge but has been declining to do so. He states he is scared of the lounge but is unable to explain why. He did sit in the lounge when he first arrived on our unit. Since then he has had several behavioral incidents requiring to place him in seclusion to his room. It is possible he has developed a mild phobia of the lounge due to these repeated incidents. Also possible that there are other patients on his side of the unit that make him feel uncomfortable. In case this is happening, we transferred him to the other side of the unit. We will plan to trial being in the lounge soon. Will repeat VPA level tomorrow since today's  was unable to get a good stick.     Plan:  - Continue hospitalization, voluntary by guardian, to observe for continued improvements.   - Continue current medications  - Repeat Valproic acid level tomorrow (unable to get a good stick today)  - If continues to show improvements in the coming days, will attempt to contact patient's previous psychiatrist again to discuss Rx'ing the Medroxyprogesterone 300mg IM q1w. LIZBET for this communication to be obtained by patient's legal guardian. If this provider is unwilling to provide this medication the patient may benefit from referral to [facility]    The risks, benefits, alternatives and side effects have been discussed and are understood by the patient and other caregivers.    Lukas Blackwell MD  PGY-2 Psychiatry Resident    Non clinically relevant CMS requirements:  Clinical Global Impressions  First:  Considering your total clinical experience with this particular patient population, how severe are the patient's symptoms at this time?: 6 (06/28/19 2011)  Compared to the patient's condition at the START of treatment, this patient's condition is:: 4 (06/28/19 2011)  Most recent:  Considering your total clinical experience with this particular patient population, how severe are the patient's symptoms at this time?:  6 (07/08/19 2125)  Compared to the patient's condition at the START of treatment, this patient's condition is:: 3 (07/08/19 2125)

## 2019-07-22 NOTE — PROGRESS NOTES
"Pt had no episodes of aggression towards staff today. He remains on SIO for impulsive aggression and sexually inappropriate behaviors. Pt is flat and doesn't engage with staff with check in attempted. Reports he's \"good,\" and denies any concerns. Continues to decline shower. Poor hygiene. Declined to spend time in the lounge this evening. He was compliant with scheduled medications and denies any side effects. Will continue to monitor.  "

## 2019-07-22 NOTE — PROGRESS NOTES
"   07/22/19 1400   Behavioral Health   Hallucinations denies / not responding to hallucinations   Thinking distractable   Orientation person: oriented;place: oriented;time: oriented   Memory baseline memory   Insight poor   Judgement impaired   Eye Contact at floor;out of corner of eyes   Affect blunted, flat   Mood mood is calm   Hygiene neglected grooming - unclean body, hair, teeth   Suicidality other (see comments)  (Pt denies)   1. Wish to be Dead No   2. Non-Specific Active Suicidal Thoughts  No   Self Injury other (see comment)  (None stated nor observed)   Elopement   (None stated nor observed)   Activity other (see comment)  (baseline activity level)   Speech pressured;coherent   Medication Sensitivity no stated side effects;no observed side effects   Psychomotor / Gait balanced;steady   Activities of Daily Living   Hygiene/Grooming independent   Oral Hygiene independent   Dress scrubs (behavioral health)   Laundry unable to complete   Room Organization independent     Brayden had a stable shift. Was very patient and willing to work with staff during morning vitals, blood draw, tray delivery, etc. No impulsivity or aggression was observed.     Pt was heard saying, \"I turn girls into pizza\" and was drawing questionable figures on paper. His room was switched to another pod mid shift and pt appeared indifferent and well behaved.     Pt denes SI/SIB/AVH and has no imminent concerns at this time.       "

## 2019-07-23 LAB
GLUCOSE BLDC GLUCOMTR-MCNC: 125 MG/DL (ref 70–99)
VALPROATE SERPL-MCNC: 92 MG/L (ref 50–100)

## 2019-07-23 PROCEDURE — 99232 SBSQ HOSP IP/OBS MODERATE 35: CPT | Performed by: PSYCHIATRY & NEUROLOGY

## 2019-07-23 PROCEDURE — 12400001 ZZH R&B MH UMMC

## 2019-07-23 PROCEDURE — 25000132 ZZH RX MED GY IP 250 OP 250 PS 637: Performed by: STUDENT IN AN ORGANIZED HEALTH CARE EDUCATION/TRAINING PROGRAM

## 2019-07-23 PROCEDURE — 00000146 ZZHCL STATISTIC GLUCOSE BY METER IP

## 2019-07-23 PROCEDURE — 25000132 ZZH RX MED GY IP 250 OP 250 PS 637: Performed by: PSYCHIATRY & NEUROLOGY

## 2019-07-23 PROCEDURE — 80164 ASSAY DIPROPYLACETIC ACD TOT: CPT | Performed by: STUDENT IN AN ORGANIZED HEALTH CARE EDUCATION/TRAINING PROGRAM

## 2019-07-23 PROCEDURE — 36415 COLL VENOUS BLD VENIPUNCTURE: CPT | Performed by: STUDENT IN AN ORGANIZED HEALTH CARE EDUCATION/TRAINING PROGRAM

## 2019-07-23 RX ADMIN — POLYETHYLENE GLYCOL 3350 17 G: 17 POWDER, FOR SOLUTION ORAL at 09:19

## 2019-07-23 RX ADMIN — DOCUSATE SODIUM 100 MG: 100 CAPSULE, LIQUID FILLED ORAL at 19:00

## 2019-07-23 RX ADMIN — NIACIN 500 MG: 500 TABLET, FILM COATED, EXTENDED RELEASE ORAL at 19:00

## 2019-07-23 RX ADMIN — CLOZAPINE 450 MG: 100 TABLET ORAL at 19:00

## 2019-07-23 RX ADMIN — DOCUSATE SODIUM 100 MG: 100 CAPSULE, LIQUID FILLED ORAL at 09:18

## 2019-07-23 RX ADMIN — ESCITALOPRAM OXALATE 20 MG: 20 TABLET ORAL at 09:18

## 2019-07-23 RX ADMIN — Medication 1 G: at 19:00

## 2019-07-23 RX ADMIN — THERA TABS 1 TABLET: TAB at 09:18

## 2019-07-23 RX ADMIN — DIVALPROEX SODIUM 1500 MG: 500 TABLET, FILM COATED, EXTENDED RELEASE ORAL at 19:00

## 2019-07-23 RX ADMIN — FENOFIBRATE 160 MG: 160 TABLET, FILM COATED ORAL at 19:00

## 2019-07-23 RX ADMIN — Medication 1 G: at 09:18

## 2019-07-23 RX ADMIN — MELATONIN 2000 UNITS: at 09:19

## 2019-07-23 RX ADMIN — DIVALPROEX SODIUM 1500 MG: 500 TABLET, FILM COATED, EXTENDED RELEASE ORAL at 09:17

## 2019-07-23 RX ADMIN — ARIPIPRAZOLE 10 MG: 10 TABLET ORAL at 09:17

## 2019-07-23 RX ADMIN — PANTOPRAZOLE SODIUM 20 MG: 20 TABLET, DELAYED RELEASE ORAL at 09:19

## 2019-07-23 RX ADMIN — METFORMIN HYDROCHLORIDE 1000 MG: 500 TABLET, FILM COATED ORAL at 09:18

## 2019-07-23 RX ADMIN — SIMVASTATIN 40 MG: 20 TABLET, FILM COATED ORAL at 19:00

## 2019-07-23 RX ADMIN — CLOZAPINE 200 MG: 100 TABLET ORAL at 09:17

## 2019-07-23 RX ADMIN — PROPRANOLOL HYDROCHLORIDE 80 MG: 80 CAPSULE, EXTENDED RELEASE ORAL at 09:19

## 2019-07-23 RX ADMIN — METFORMIN HYDROCHLORIDE 1000 MG: 500 TABLET, FILM COATED ORAL at 18:59

## 2019-07-23 ASSESSMENT — ACTIVITIES OF DAILY LIVING (ADL)
HYGIENE/GROOMING: INDEPENDENT
ORAL_HYGIENE: INDEPENDENT
HYGIENE/GROOMING: INDEPENDENT
DRESS: SCRUBS (BEHAVIORAL HEALTH)
LAUNDRY: UNABLE TO COMPLETE
DRESS: SCRUBS (BEHAVIORAL HEALTH);INDEPENDENT
ORAL_HYGIENE: INDEPENDENT

## 2019-07-23 NOTE — PROGRESS NOTES
"Encouraged pt to spend time out of his room this evening, which he agreed to trial. The lounge was cleared and Brayden sat down to watch a show. Within the minute, he stood up and charged at staff, who were able to safely walk Brayden back to his room. He was asked why he lunged at staff and he replied \"sorry. I just dont like sitting in the living room. I don't want to sit out there with people I don't know.\" He denied AH or VH contributing to his behavior. No further aggressive behaviors observed or reported. Denies SEs to medication and states he is otherwise \"doing real good\" today. Reports mood is good, affect is blunted. Medication compliant and VSS.   "

## 2019-07-23 NOTE — PROGRESS NOTES
BRIEF UPDATE:    Spoke with Wanda Eugene, the , via phone. Informed her that Brayden may be nearing readiness to discharge. Informed her about lunging at staff yesterday while in lounge and how this appeared more likely motivated by desire to get back into room, rather than anger/hostility or hypersexuality. She agreed that he does not tend to do well with crowded environments. She said he also does not like being told what to do (for example telling him to go into lounge rather than asking may be triggering for him). Informed Wanda that we made medication adjustments of increasing the Medroxyprogesterone to 300mg q1w and increasing the Divalproex as well, and after both of these adjustments we did notice some improvement although difficult to tell due to continuing intermittent incidents, for example the one yesterday. She agreed that assessing Brayden's behavior in the lounge may not be an accurate gauge of readiness for discharge and she thinks that if we feel he is ready she is willing to take him back, but first they will want a care team meeting. The earliest date she will be ready for this is 7/30/19. She prefers times between 11am-2pm. Informed her that we would find a time that works for us and call her back to confirm.     - Wanda's Phone #:[phone]    Lukas Blackwell MD  PGY-2 Psychiatry Resident

## 2019-07-23 NOTE — PROGRESS NOTES
[facility], [place]   Psychiatric Progress Note  Brayden Adrian  [id number]  07/23/19    Chief Complaint: Continued medical care          Interim History:   The patient's care was discussed with the treatment team during the daily team meeting and/or staff's chart notes were reviewed.  Staff report patient charged at staff but only when out in the milieu and wanted to go back to his room.  Described anxiety out in the living room.  Slept about 6 hours.    Upon meeting with him he says that he is feeling good today and does not have any current problems.  He has been passing gas from his rectum.  He denies any thoughts of harming himself or others any thoughts about grabbing people or raping them.  He has not felt as though people are giving him dirty looks and he has been eating well and staying to himself.  He is still not interested in going out into the living room and says that it makes him uncomfortable.  He prefers to be in his room watching television.  He mentions that at the group home he also stays to himself in his room.  Denies any hallucinations or paranoia or any current delusional thoughts.  Denies any communication with [place] but does mention a prize to his spirit again.  Unclear exactly what that means and then he mentions that he says things from his imagination.  He did describe what imagination is when asked for the definition.  Did recommend to him that he continue to walk around and get exercise.         Medications:       ARIPiprazole  10 mg Oral Daily     cloZAPine  200 mg Oral QAM     cloZAPine  450 mg Oral At Bedtime     DIADERM FOOT REJUVENATING   Apply externally Daily     divalproex sodium extended-release  1,500 mg Oral BID     docusate sodium  100 mg Oral BID     escitalopram  20 mg Oral Daily     fenofibrate  160 mg Oral QPM     fish oil-omega-3 fatty acids  1 g Oral BID     medroxyPROGESTERone  300 mg Intramuscular Q7 Days     metFORMIN  1,000 mg Oral  "BID w/meals     multivitamin, therapeutic  1 tablet Oral Daily     niacin ER  500 mg Oral At Bedtime     pantoprazole  20 mg Oral Daily     polyethylene glycol  17 g Oral Daily     propranolol ER  80 mg Oral Daily     simvastatin  40 mg Oral At Bedtime     vitamin D3  2,000 Units Oral Daily          Allergies:     Allergies   Allergen Reactions     Haldol [Haloperidol] Other (See Comments)     Increases pt's hallucinations           Labs:     Recent Results (from the past 24 hour(s))   Glucose by meter    Collection Time: 07/23/19  7:41 AM   Result Value Ref Range    Glucose 125 (H) 70 - 99 mg/dL   Valproic acid    Collection Time: 07/23/19  8:02 AM   Result Value Ref Range    Valproic Acid Level 92 50 - 100 mg/L          Psychiatric Examination:     /83   Pulse 103   Temp 98.3  F (36.8  C) (Tympanic)   Resp 16   Ht 1.753 m (5' 9\")   Wt 101.6 kg (224 lb)   SpO2 100%   BMI 33.08 kg/m    Weight is 224 lbs 0 oz  Body mass index is 33.08 kg/m .  Lying Orthostatic BP: 121/72      Lying Orthostatic Pulse: 93 bpm      Sitting Orthostatic BP: 121/72      Sitting Orthostatic Pulse: 93 bpm      Standing Orthostatic BP: 126/82      Standing Orthostatic Pulse: 99 bpm       Weight over time:  Vitals:    06/26/19 1527 06/26/19 2102   Weight: 94.8 kg (209 lb) 101.6 kg (224 lb)       Orthostatic Vitals       Most Recent      Lying Orthostatic /72 07/22 0905    Lying Orthostatic Pulse (bpm) 93 07/22 0905    Sitting Orthostatic /72 07/22 0944    Sitting Orthostatic Pulse (bpm) 93 07/22 0944    Standing Orthostatic /82 07/22 0944    Standing Orthostatic Pulse (bpm) 99 07/22 0944            Cardiometabolic risk assessment. 07/23/19      Reviewed patient profile for cardiometabolic risk factors    Date taken /Value  REFERENCE RANGE   Abdominal Obesity  (Waist Circumference)   See nursing flowsheet Women ?35 in (88 cm)   Men ?40 in (102 cm)      Triglycerides  No results found for: TRIG    ?150 mg/dL (1.7 " mmol/L) or current treatment for elevated triglycerides   HDL cholesterol  No results found for: HDL]   Women <50 mg/dL (1.3 mmol/L) in women or current treatment for low HDL cholesterol  Men <40 mg/dL (1 mmol/L) in men or current treatment for low HDL cholesterol     Fasting plasma glucose (FPG) Lab Results   Component Value Date     06/26/2019      FPG ?100 mg/dL (5.6 mmol/L) or treatment for elevated blood glucose   Blood pressure  BP Readings from Last 3 Encounters:   07/23/19 135/83   04/02/19 110/73    Blood pressure ?130/85 mmHg or treatment for elevated blood pressure   Family History  See family history         Brayden is a 54-year-old male with white hair and eyeglasses and is overweight.  His speech is simplistic and of an appropriate rate and tone.  His behavior is appropriate and he does not appear to have any abnormal movements.  His affect is neutral.  His mood he describes as good.  His thought content consists of the above without thoughts of harming himself or others possible delusions at times.  His thought process is concrete with sometimes experiencing looseness of association.  He may have abnormal perceptions at times.  He is alert but not aware of current circumstances completely.  His attention and concentration is limited to impaired.  His long-term/short-term/remote memory is limited to impaired.  His insight and judgment are both impaired.         Precautions:     Behavioral Orders   Procedures     Assault precautions     Code 1 - Restrict to Unit     Routine Programming     As clinically indicated     Sexual precautions     Status 15     Every 15 minutes.     Status Individual Observation     Male only.  10 feet space restriction from peers and staff. Staff to stay 10 ft when patient in hallway. OK to sit at door when patient in room.     Order Specific Question:   CONTINUOUS 24 hours / day     Answer:   Other     Order Specific Question:   Specify distance     Answer:   Male  only.  10 feet space restriction from peers and staff. Staff to stay 10 ft when patient in hallway. OK to sit at door when patient in room.     Order Specific Question:   Indications for SIO     Answer:   Assault risk     Order Specific Question:   Indications for SIO     Answer:   Severe intrusiveness          DIagnoses:     Schizoaffective disorder bipolar type  Mild intellectual disability  History of Tardive Dyskinesia  History of TBI  Polysubstance use disorder, in remission         Assessment & Plan:     Brayden appears to be doing well after increasing the divalproex.  He seems to be more organized continues to not describe any thoughts of harming people or grabbing people.  Seems as though the incident that happened yesterday was mostly related to being out in the milieu and not necessarily related to aggressive or hypersexual behaviors.  We will continue to monitor and potentially could discharge if things go well sometime this upcoming week.  If still having problems may need further increases in clozapine.    Continue voluntary by guardian    The risks, benefits, alternatives and side effects have been discussed and are understood by the patient and other caregivers.      Cayetano Mosher  [facility] Psychiatry      The following document has been created with voice recognition software and may contain unintentional word substitutions.    Non clinically relevant CMS requirements:  Clinical Global Impressions  First:  Considering your total clinical experience with this particular patient population, how severe are the patient's symptoms at this time?: 6 (06/28/19 2011)  Compared to the patient's condition at the START of treatment, this patient's condition is:: 4 (06/28/19 2011)  Most recent:  Considering your total clinical experience with this particular patient population, how severe are the patient's symptoms at this time?: 5 (07/16/19 0814)  Compared to the patient's condition at the  START of treatment, this patient's condition is:: 3 (07/16/19 0814)

## 2019-07-23 NOTE — PLAN OF CARE
"  Pt presents with distractible thinking and poor concentration. Affect blunted. Appearance disheveled and untidy. Pt took shower this shift and is seen to be less disheveled after shower. Activity withdrawn.    Pt seen to eat breakfast and lunch and does not complain of any issues surrounding appetite. In reference to sleep hygiene, Pt states \"I go to sleep early and fall asleep fast.\" Pt seen to sleep 6.5 hrs the last two nights. When asked if Pt is experiencing any adverse/side effects to medications, Pt states \"I Don't know.\" No adverse/side effects seen by staff.     When ask if Pt is experiencing any pain/injuries, Pt states \"rectal gas.\" No acute physical ailments seen by staff. When asked if Pt is feeling sad/depressed, Pt states \"no.\" Pt denies SI/SIB/HI.     Pt endorses AH. Pt states \"I hear people in my old town. They are visiting and talking to me.\" Pt endorses VH. Pt states \"I can see you guys through the wall.\"     Matt, from Pt's , visited today at 1335. Visit appears positive in nature.     Pt has not lunged at staff this shift. Pt has not had any behavioral issues this shift.   "

## 2019-07-24 LAB
BASOPHILS # BLD AUTO: 0.1 10E9/L (ref 0–0.2)
BASOPHILS NFR BLD AUTO: 0.6 %
DIFFERENTIAL METHOD BLD: NORMAL
EOSINOPHIL # BLD AUTO: 0.2 10E9/L (ref 0–0.7)
EOSINOPHIL NFR BLD AUTO: 1.6 %
GLUCOSE BLDC GLUCOMTR-MCNC: 120 MG/DL (ref 70–99)
IMM GRANULOCYTES # BLD: 0.1 10E9/L (ref 0–0.4)
IMM GRANULOCYTES NFR BLD: 0.7 %
LYMPHOCYTES # BLD AUTO: 3.4 10E9/L (ref 0.8–5.3)
LYMPHOCYTES NFR BLD AUTO: 31.6 %
MONOCYTES # BLD AUTO: 1.2 10E9/L (ref 0–1.3)
MONOCYTES NFR BLD AUTO: 11.1 %
NEUTROPHILS # BLD AUTO: 5.9 10E9/L (ref 1.6–8.3)
NEUTROPHILS NFR BLD AUTO: 54.4 %
NRBC # BLD AUTO: 0 10*3/UL
NRBC BLD AUTO-RTO: 0 /100
WBC # BLD AUTO: 10.9 10E9/L (ref 4–11)

## 2019-07-24 PROCEDURE — 25000132 ZZH RX MED GY IP 250 OP 250 PS 637: Performed by: PSYCHIATRY & NEUROLOGY

## 2019-07-24 PROCEDURE — 99232 SBSQ HOSP IP/OBS MODERATE 35: CPT | Performed by: PSYCHIATRY & NEUROLOGY

## 2019-07-24 PROCEDURE — 12400001 ZZH R&B MH UMMC

## 2019-07-24 PROCEDURE — 00000146 ZZHCL STATISTIC GLUCOSE BY METER IP

## 2019-07-24 PROCEDURE — 85004 AUTOMATED DIFF WBC COUNT: CPT | Performed by: PSYCHIATRY & NEUROLOGY

## 2019-07-24 PROCEDURE — 85048 AUTOMATED LEUKOCYTE COUNT: CPT | Performed by: PSYCHIATRY & NEUROLOGY

## 2019-07-24 PROCEDURE — 36416 COLLJ CAPILLARY BLOOD SPEC: CPT | Performed by: PSYCHIATRY & NEUROLOGY

## 2019-07-24 PROCEDURE — 25000132 ZZH RX MED GY IP 250 OP 250 PS 637: Performed by: STUDENT IN AN ORGANIZED HEALTH CARE EDUCATION/TRAINING PROGRAM

## 2019-07-24 RX ADMIN — DIVALPROEX SODIUM 1500 MG: 500 TABLET, FILM COATED, EXTENDED RELEASE ORAL at 08:06

## 2019-07-24 RX ADMIN — DIVALPROEX SODIUM 1500 MG: 500 TABLET, FILM COATED, EXTENDED RELEASE ORAL at 19:17

## 2019-07-24 RX ADMIN — POLYETHYLENE GLYCOL 3350 17 G: 17 POWDER, FOR SOLUTION ORAL at 08:04

## 2019-07-24 RX ADMIN — NIACIN 500 MG: 500 TABLET, FILM COATED, EXTENDED RELEASE ORAL at 19:17

## 2019-07-24 RX ADMIN — ARIPIPRAZOLE 10 MG: 10 TABLET ORAL at 08:06

## 2019-07-24 RX ADMIN — CLOZAPINE 450 MG: 100 TABLET ORAL at 19:17

## 2019-07-24 RX ADMIN — DOCUSATE SODIUM 100 MG: 100 CAPSULE, LIQUID FILLED ORAL at 08:06

## 2019-07-24 RX ADMIN — METFORMIN HYDROCHLORIDE 1000 MG: 500 TABLET, FILM COATED ORAL at 17:55

## 2019-07-24 RX ADMIN — SIMVASTATIN 40 MG: 20 TABLET, FILM COATED ORAL at 19:17

## 2019-07-24 RX ADMIN — PROPRANOLOL HYDROCHLORIDE 80 MG: 80 CAPSULE, EXTENDED RELEASE ORAL at 08:06

## 2019-07-24 RX ADMIN — FENOFIBRATE 160 MG: 160 TABLET, FILM COATED ORAL at 19:17

## 2019-07-24 RX ADMIN — ESCITALOPRAM OXALATE 20 MG: 20 TABLET ORAL at 08:06

## 2019-07-24 RX ADMIN — Medication 1 G: at 08:06

## 2019-07-24 RX ADMIN — METFORMIN HYDROCHLORIDE 1000 MG: 500 TABLET, FILM COATED ORAL at 08:56

## 2019-07-24 RX ADMIN — Medication 1 G: at 19:17

## 2019-07-24 RX ADMIN — CLOZAPINE 200 MG: 100 TABLET ORAL at 08:06

## 2019-07-24 RX ADMIN — MELATONIN 2000 UNITS: at 08:07

## 2019-07-24 RX ADMIN — DOCUSATE SODIUM 100 MG: 100 CAPSULE, LIQUID FILLED ORAL at 19:17

## 2019-07-24 RX ADMIN — THERA TABS 1 TABLET: TAB at 08:06

## 2019-07-24 RX ADMIN — PANTOPRAZOLE SODIUM 20 MG: 20 TABLET, DELAYED RELEASE ORAL at 08:06

## 2019-07-24 ASSESSMENT — ACTIVITIES OF DAILY LIVING (ADL)
LAUNDRY: UNABLE TO COMPLETE
ORAL_HYGIENE: PROMPTS
HYGIENE/GROOMING: PROMPTS
ORAL_HYGIENE: PROMPTS
DRESS: SCRUBS (BEHAVIORAL HEALTH)
HYGIENE/GROOMING: PROMPTS
DRESS: PROMPTS;SCRUBS (BEHAVIORAL HEALTH)

## 2019-07-24 NOTE — PROGRESS NOTES
"   07/24/19 1506   Significant Event   Significant Event Other (see comments)  (shift summary)   Pt had one episode of aggressive behavior this shift. When walking to the shower with staff he suddenly lunged at them. Staff intervened without incident and walked pt back to room. Pt immediately stated, \"Sorry\" several times. Shortly after, staff attempted to allow him again, and he was able to take a shower with no problems.   "

## 2019-07-24 NOTE — PROGRESS NOTES
"Pt remains on SIO staffing for safety due to impulsive aggressive behavior when in the milieu. He declined to spend any time in the lounge this evening. Reports that his mood is \"good,\" and denies having any concerns. He is withdrawn on approach. Hygiene appropriate after showering today; encouraged pt to shower again tomorrow.  Pt was compliant with scheduled medications. No observed adverse effects.   "

## 2019-07-24 NOTE — PROGRESS NOTES
[facility], [place]   Psychiatric Progress Note  Brayden Adrian  [id number]  07/24/19    Chief Complaint: Continued medical care          Interim History:   The patient's care was discussed with the treatment team during the daily team meeting and/or staff's chart notes were reviewed.  Staff report patient has been doing well on the unit has not had any aggressive behaviors and slept about 4 hours.    Upon visiting with him he just lunged at staff related to them inquiring if he would like to take a shower.  It seems as though that is consistent with what group home staff have described when he is not interested in doing something.  When I met with him he says that he is now interested in going to take a shower and after our interview was able to do it without problem.  He states that he has been spending his time drying pictures of  people and that he is not looking to sexually assaulted anyone or harm anyone.  He is looking for prizes and outside and is unable to elaborate on that.  He does not feel as though anyone is trying to harm him or giving him dirty looks.  He is not anxious or scared about anything and denies any thoughts of harming himself.  He is not sad but does mention depression.  Unclear what he means by depression.  No hopelessness or helplessness or anhedonia and is future focused about going back to though group home.  No hallucinations or paranoia though he does have attention concentration issues and some disorganization at baseline.  We discussed cars apparently the [employer] is not his dream car and he is more interested in [employer] and Lambos.          Medications:       ARIPiprazole  10 mg Oral Daily     cloZAPine  200 mg Oral QAM     cloZAPine  450 mg Oral At Bedtime     DIADERM FOOT REJUVENATING   Apply externally Daily     divalproex sodium extended-release  1,500 mg Oral BID     docusate sodium  100 mg Oral BID     escitalopram  20 mg Oral  "Daily     fenofibrate  160 mg Oral QPM     fish oil-omega-3 fatty acids  1 g Oral BID     medroxyPROGESTERone  300 mg Intramuscular Q7 Days     metFORMIN  1,000 mg Oral BID w/meals     multivitamin, therapeutic  1 tablet Oral Daily     niacin ER  500 mg Oral At Bedtime     pantoprazole  20 mg Oral Daily     polyethylene glycol  17 g Oral Daily     propranolol ER  80 mg Oral Daily     simvastatin  40 mg Oral At Bedtime     vitamin D3  2,000 Units Oral Daily          Allergies:     Allergies   Allergen Reactions     Haldol [Haloperidol] Other (See Comments)     Increases pt's hallucinations           Labs:     Recent Results (from the past 24 hour(s))   Glucose by meter    Collection Time: 07/24/19  7:57 AM   Result Value Ref Range    Glucose 120 (H) 70 - 99 mg/dL   WBC and differential    Collection Time: 07/24/19  9:13 AM   Result Value Ref Range    WBC 10.9 4.0 - 11.0 10e9/L    Diff Method Automated Method     % Neutrophils 54.4 %    % Lymphocytes 31.6 %    % Monocytes 11.1 %    % Eosinophils 1.6 %    % Basophils 0.6 %    % Immature Granulocytes 0.7 %    Nucleated RBCs 0 0 /100    Absolute Neutrophil 5.9 1.6 - 8.3 10e9/L    Absolute Lymphocytes 3.4 0.8 - 5.3 10e9/L    Absolute Monocytes 1.2 0.0 - 1.3 10e9/L    Absolute Eosinophils 0.2 0.0 - 0.7 10e9/L    Absolute Basophils 0.1 0.0 - 0.2 10e9/L    Abs Immature Granulocytes 0.1 0 - 0.4 10e9/L    Absolute Nucleated RBC 0.0           Psychiatric Examination:     /84   Pulse 98   Temp 98.5  F (36.9  C) (Tympanic)   Resp 16   Ht 1.753 m (5' 9\")   Wt 101.6 kg (224 lb)   SpO2 98%   BMI 33.08 kg/m    Weight is 224 lbs 0 oz  Body mass index is 33.08 kg/m .  Lying Orthostatic BP: 124/84      Lying Orthostatic Pulse: 98 bpm      Sitting Orthostatic BP: 121/72      Sitting Orthostatic Pulse: 93 bpm      Standing Orthostatic BP: 126/82      Standing Orthostatic Pulse: 99 bpm       Weight over time:  Vitals:    06/26/19 1527 06/26/19 2102   Weight: 94.8 kg (209 lb) " 101.6 kg (224 lb)       Orthostatic Vitals       Most Recent      Lying Orthostatic /84 07/24 0752    Lying Orthostatic Pulse (bpm) 98 07/24 0752            Cardiometabolic risk assessment. 07/24/19      Reviewed patient profile for cardiometabolic risk factors    Date taken /Value  REFERENCE RANGE   Abdominal Obesity  (Waist Circumference)   See nursing flowsheet Women ?35 in (88 cm)   Men ?40 in (102 cm)      Triglycerides  No results found for: TRIG    ?150 mg/dL (1.7 mmol/L) or current treatment for elevated triglycerides   HDL cholesterol  No results found for: HDL]   Women <50 mg/dL (1.3 mmol/L) in women or current treatment for low HDL cholesterol  Men <40 mg/dL (1 mmol/L) in men or current treatment for low HDL cholesterol     Fasting plasma glucose (FPG) Lab Results   Component Value Date     06/26/2019      FPG ?100 mg/dL (5.6 mmol/L) or treatment for elevated blood glucose   Blood pressure  BP Readings from Last 3 Encounters:   07/24/19 124/84   04/02/19 110/73    Blood pressure ?130/85 mmHg or treatment for elevated blood pressure   Family History  See family history         Brayden is a 54-year-old male with white hair and eyeglasses and is overweight.  His speech is simplistic and of an appropriate rate and tone.  His behavior is appropriate and he does not appear to have any abnormal movements.  His affect is neutral.  His mood he describes as ok.  His thought content consists of the above without thoughts of harming himself or others possible delusions at times.  His thought process is concrete with sometimes experiencing looseness of association.  He may have abnormal perceptions at times.  He is alert but not aware of current circumstances completely.  His attention and concentration is limited to impaired.  His long-term/short-term/remote memory is limited to impaired.  His insight and judgment are both impaired.            Precautions:     Behavioral Orders   Procedures     Assault  precautions     Code 1 - Restrict to Unit     Routine Programming     As clinically indicated     Sexual precautions     Status 15     Every 15 minutes.     Status Individual Observation     Male only.  10 feet space restriction from peers and staff. Staff to stay 10 ft when patient in hallway. OK to sit at door when patient in room.     Order Specific Question:   CONTINUOUS 24 hours / day     Answer:   Other     Order Specific Question:   Specify distance     Answer:   Male only.  10 feet space restriction from peers and staff. Staff to stay 10 ft when patient in hallway. OK to sit at door when patient in room.     Order Specific Question:   Indications for SIO     Answer:   Assault risk     Order Specific Question:   Indications for SIO     Answer:   Severe intrusiveness          DIagnoses:     Schizoaffective disorder bipolar type  Mild intellectual disability  History of Tardive Dyskinesia  History of TBI  Polysubstance use disorder, in remission         Assessment & Plan:     Brayden seems to be doing well after recent increases in medication and has not had any hypersexuality or aggressive behaviors.  We will continue to monitor as we want to make sure these behaviors are consistently gone prior to discharge back to the group home.  If he is still having problems managing these behaviors could increase clozapine further however it does not seem to be necessary at this time.  His current behavior seems to be similar to group home behavior avoiding the living room and wondering at people if he is asked to do something he is not interested in doing at that time.    Continue voluntary by guardian    The risks, benefits, alternatives and side effects have been discussed and are understood by the patient and other caregivers.      Cayetano Mosher  [facility]      The following document has been created with voice recognition software and may contain unintentional word  substitutions.    Non clinically relevant CMS requirements:  Clinical Global Impressions  First:  Considering your total clinical experience with this particular patient population, how severe are the patient's symptoms at this time?: 6 (06/28/19 2011)  Compared to the patient's condition at the START of treatment, this patient's condition is:: 4 (06/28/19 2011)  Most recent:  Considering your total clinical experience with this particular patient population, how severe are the patient's symptoms at this time?: 5 (07/24/19 1426)  Compared to the patient's condition at the START of treatment, this patient's condition is:: 3 (07/24/19 1426)

## 2019-07-25 LAB — GLUCOSE BLDC GLUCOMTR-MCNC: 126 MG/DL (ref 70–99)

## 2019-07-25 PROCEDURE — 25000132 ZZH RX MED GY IP 250 OP 250 PS 637: Performed by: STUDENT IN AN ORGANIZED HEALTH CARE EDUCATION/TRAINING PROGRAM

## 2019-07-25 PROCEDURE — 12400001 ZZH R&B MH UMMC

## 2019-07-25 PROCEDURE — 00000146 ZZHCL STATISTIC GLUCOSE BY METER IP

## 2019-07-25 PROCEDURE — 25000132 ZZH RX MED GY IP 250 OP 250 PS 637: Performed by: PSYCHIATRY & NEUROLOGY

## 2019-07-25 RX ADMIN — PROPRANOLOL HYDROCHLORIDE 80 MG: 80 CAPSULE, EXTENDED RELEASE ORAL at 08:50

## 2019-07-25 RX ADMIN — METFORMIN HYDROCHLORIDE 1000 MG: 500 TABLET, FILM COATED ORAL at 08:50

## 2019-07-25 RX ADMIN — Medication 1 G: at 08:51

## 2019-07-25 RX ADMIN — CLOZAPINE 200 MG: 100 TABLET ORAL at 08:50

## 2019-07-25 RX ADMIN — FENOFIBRATE 160 MG: 160 TABLET, FILM COATED ORAL at 20:27

## 2019-07-25 RX ADMIN — DOCUSATE SODIUM 100 MG: 100 CAPSULE, LIQUID FILLED ORAL at 08:51

## 2019-07-25 RX ADMIN — SIMVASTATIN 40 MG: 20 TABLET, FILM COATED ORAL at 20:27

## 2019-07-25 RX ADMIN — MELATONIN 2000 UNITS: at 08:50

## 2019-07-25 RX ADMIN — ESCITALOPRAM OXALATE 20 MG: 20 TABLET ORAL at 08:51

## 2019-07-25 RX ADMIN — DIVALPROEX SODIUM 1500 MG: 500 TABLET, FILM COATED, EXTENDED RELEASE ORAL at 20:27

## 2019-07-25 RX ADMIN — DOCUSATE SODIUM 100 MG: 100 CAPSULE, LIQUID FILLED ORAL at 20:27

## 2019-07-25 RX ADMIN — Medication 1 G: at 20:27

## 2019-07-25 RX ADMIN — THERA TABS 1 TABLET: TAB at 08:51

## 2019-07-25 RX ADMIN — POLYETHYLENE GLYCOL 3350 17 G: 17 POWDER, FOR SOLUTION ORAL at 08:50

## 2019-07-25 RX ADMIN — DIVALPROEX SODIUM 1500 MG: 500 TABLET, FILM COATED, EXTENDED RELEASE ORAL at 08:50

## 2019-07-25 RX ADMIN — CLOZAPINE 450 MG: 100 TABLET ORAL at 20:27

## 2019-07-25 RX ADMIN — METFORMIN HYDROCHLORIDE 1000 MG: 500 TABLET, FILM COATED ORAL at 17:50

## 2019-07-25 RX ADMIN — ARIPIPRAZOLE 10 MG: 10 TABLET ORAL at 08:51

## 2019-07-25 RX ADMIN — NIACIN 500 MG: 500 TABLET, FILM COATED, EXTENDED RELEASE ORAL at 20:27

## 2019-07-25 RX ADMIN — PANTOPRAZOLE SODIUM 20 MG: 20 TABLET, DELAYED RELEASE ORAL at 08:51

## 2019-07-25 ASSESSMENT — ACTIVITIES OF DAILY LIVING (ADL)
HYGIENE/GROOMING: PROMPTS
ORAL_HYGIENE: PROMPTS
LAUNDRY: UNABLE TO COMPLETE
DRESS: SCRUBS (BEHAVIORAL HEALTH)
ORAL_HYGIENE: PROMPTS
LAUNDRY: UNABLE TO COMPLETE
HYGIENE/GROOMING: PROMPTS
DRESS: SCRUBS (BEHAVIORAL HEALTH)

## 2019-07-25 NOTE — PROGRESS NOTES
I spoke with the group home director, Wanda, today.    We arranged for a care conference meeting on Tuesday July 30th at 1:30pm.   There will be anywhere from 3-5  staff present.  The guardian/father is also invited.   Dr Mosher and I will attend, and possible a unit RN.

## 2019-07-25 NOTE — PROGRESS NOTES
Pt charged at RN writer while giving him schedule medications.  Staff intervened and patient immediately sat down on his bed and began to apologize.  Continuation of SIO appropriate at this time due to level of impulsive aggression.

## 2019-07-25 NOTE — PROGRESS NOTES
Staff reported to RN writer patient observed while on SIO making frequent trips to the restroom. When asked patient reports having symptoms of loose stools which was not observed by staff. Continue to monitor and consider holding scheduled laxatives.

## 2019-07-25 NOTE — PROGRESS NOTES
07/25/19 1400   Behavioral Health   Hallucinations auditory   Thinking distractable   Orientation person: oriented   Memory baseline memory   Insight poor   Judgement impaired   Eye Contact staring   Affect blunted, flat   Mood mood is calm   Physical Appearance/Attire untidy   Hygiene neglected grooming - unclean body, hair, teeth   Suicidality other (see comments)  (denies)   1. Wish to be Dead No   2. Non-Specific Active Suicidal Thoughts  No   Self Injury other (see comment)  (denies)   Elopement   (no value)   Activity isolative;withdrawn   Speech pressured   Medication Sensitivity no observed side effects;no stated side effects   Psychomotor / Gait balanced;steady   Psycho Education   Type of Intervention 1:1 intervention   Response participates with encouragement   Hours 0.5   Treatment Detail check-in   Activities of Daily Living   Hygiene/Grooming prompts   Oral Hygiene prompts   Dress scrubs (behavioral health)   Laundry unable to complete   Room Organization prompts       Patient is calm and cooperative. Pt in room for the whole day. Pt Isolative and withdrawn. No SI or SIB. Pt hears voices, but remains calm in room.

## 2019-07-25 NOTE — PLAN OF CARE
"Patient continues to remain isolated to room and self this evening. Flat affect, calm mood. Delusional thought process, distractible. Untidy, disheveled, but did shower today. Linens changed by staff. Denied anxiety and depression, denies SI, SIB. Reports auditory hallucinations of \"voices coming from the walls, they want to eat my spirit when I leave here.\" Writer asked patient if he believes that, he replied, \"part of it.\" Also reported,\" My spiritual guide tells me to save food from my tray.\" Denies any current homicidal urges. No aggressive behavior see or reported. Medication compliant. Will continue to monitor for safety.  "

## 2019-07-26 LAB — GLUCOSE BLDC GLUCOMTR-MCNC: 113 MG/DL (ref 70–99)

## 2019-07-26 PROCEDURE — 25000128 H RX IP 250 OP 636: Performed by: STUDENT IN AN ORGANIZED HEALTH CARE EDUCATION/TRAINING PROGRAM

## 2019-07-26 PROCEDURE — 12400001 ZZH R&B MH UMMC

## 2019-07-26 PROCEDURE — 00000146 ZZHCL STATISTIC GLUCOSE BY METER IP

## 2019-07-26 PROCEDURE — 25000132 ZZH RX MED GY IP 250 OP 250 PS 637: Performed by: PSYCHIATRY & NEUROLOGY

## 2019-07-26 PROCEDURE — 99232 SBSQ HOSP IP/OBS MODERATE 35: CPT | Performed by: PSYCHIATRY & NEUROLOGY

## 2019-07-26 PROCEDURE — 25000132 ZZH RX MED GY IP 250 OP 250 PS 637: Performed by: STUDENT IN AN ORGANIZED HEALTH CARE EDUCATION/TRAINING PROGRAM

## 2019-07-26 RX ADMIN — METFORMIN HYDROCHLORIDE 1000 MG: 500 TABLET, FILM COATED ORAL at 19:01

## 2019-07-26 RX ADMIN — DOCUSATE SODIUM 100 MG: 100 CAPSULE, LIQUID FILLED ORAL at 08:07

## 2019-07-26 RX ADMIN — DOCUSATE SODIUM 100 MG: 100 CAPSULE, LIQUID FILLED ORAL at 19:05

## 2019-07-26 RX ADMIN — FENOFIBRATE 160 MG: 160 TABLET, FILM COATED ORAL at 19:01

## 2019-07-26 RX ADMIN — MELATONIN 2000 UNITS: at 08:06

## 2019-07-26 RX ADMIN — PROPRANOLOL HYDROCHLORIDE 80 MG: 80 CAPSULE, EXTENDED RELEASE ORAL at 08:06

## 2019-07-26 RX ADMIN — METFORMIN HYDROCHLORIDE 1000 MG: 500 TABLET, FILM COATED ORAL at 08:07

## 2019-07-26 RX ADMIN — Medication 1 G: at 08:07

## 2019-07-26 RX ADMIN — Medication 1 G: at 19:01

## 2019-07-26 RX ADMIN — ARIPIPRAZOLE 10 MG: 10 TABLET ORAL at 08:07

## 2019-07-26 RX ADMIN — ESCITALOPRAM OXALATE 20 MG: 20 TABLET ORAL at 08:07

## 2019-07-26 RX ADMIN — PANTOPRAZOLE SODIUM 20 MG: 20 TABLET, DELAYED RELEASE ORAL at 08:07

## 2019-07-26 RX ADMIN — DIVALPROEX SODIUM 1500 MG: 500 TABLET, FILM COATED, EXTENDED RELEASE ORAL at 19:01

## 2019-07-26 RX ADMIN — POLYETHYLENE GLYCOL 3350 17 G: 17 POWDER, FOR SOLUTION ORAL at 08:07

## 2019-07-26 RX ADMIN — ACETAMINOPHEN 650 MG: 325 TABLET, FILM COATED ORAL at 19:01

## 2019-07-26 RX ADMIN — CLOZAPINE 200 MG: 100 TABLET ORAL at 08:06

## 2019-07-26 RX ADMIN — NIACIN 500 MG: 500 TABLET, FILM COATED, EXTENDED RELEASE ORAL at 19:01

## 2019-07-26 RX ADMIN — THERA TABS 1 TABLET: TAB at 08:07

## 2019-07-26 RX ADMIN — CLOZAPINE 450 MG: 100 TABLET ORAL at 19:01

## 2019-07-26 RX ADMIN — SIMVASTATIN 40 MG: 20 TABLET, FILM COATED ORAL at 19:01

## 2019-07-26 RX ADMIN — MEDROXYPROGESTERONE ACETATE 300 MG: 150 INJECTION, SUSPENSION INTRAMUSCULAR at 11:07

## 2019-07-26 RX ADMIN — DIVALPROEX SODIUM 1500 MG: 500 TABLET, FILM COATED, EXTENDED RELEASE ORAL at 08:06

## 2019-07-26 ASSESSMENT — ACTIVITIES OF DAILY LIVING (ADL)
DRESS: SCRUBS (BEHAVIORAL HEALTH)
HYGIENE/GROOMING: PROMPTS
ORAL_HYGIENE: PROMPTS
HYGIENE/GROOMING: PROMPTS
ORAL_HYGIENE: PROMPTS
DRESS: SCRUBS (BEHAVIORAL HEALTH)

## 2019-07-26 NOTE — PROGRESS NOTES
[facility], [place]   Psychiatric Progress Note  Brayden Adrian  [id number]  07/26/19    Chief Complaint: Continued medical care          Interim History:   The patient's care was discussed with the treatment team during the daily team meeting and/or staff's chart notes were reviewed.  Staff report patient has been appearing as though he is having hallucinations did attempt to lunge at a staff member however was redirectable and apologetic and slept about 5 hours overnight.    Upon visiting with him he said that he is feeling okay he is just pending his time relaxing.  He is not thinking about sexually assaulting anyone or harming anyone.  Denies any hallucinations or paranoia or any thoughts that other people are giving him dirty looks.  No current side effects from medications.  Does mention sleep problems however he was trying to stay awake all night for some reason.  When I inquired why he continues to drop  people he asks me to leave and come back later.    When I come back later he is unable to give me further reasoning as to why he draws  people.  He denies any thoughts of harming himself or others any appetite issues anhedonia and still has attention concentration deficits at baseline.  Is not currently anxious and does not mention any strange dreams or prizes to his spirit.         Medications:       ARIPiprazole  10 mg Oral Daily     cloZAPine  200 mg Oral QAM     cloZAPine  450 mg Oral At Bedtime     DIADERM FOOT REJUVENATING   Apply externally Daily     divalproex sodium extended-release  1,500 mg Oral BID     docusate sodium  100 mg Oral BID     escitalopram  20 mg Oral Daily     fenofibrate  160 mg Oral QPM     fish oil-omega-3 fatty acids  1 g Oral BID     medroxyPROGESTERone  300 mg Intramuscular Q7 Days     metFORMIN  1,000 mg Oral BID w/meals     multivitamin, therapeutic  1 tablet Oral Daily     niacin ER  500 mg Oral At Bedtime     pantoprazole  20 mg  "Oral Daily     polyethylene glycol  17 g Oral Daily     propranolol ER  80 mg Oral Daily     simvastatin  40 mg Oral At Bedtime     vitamin D3  2,000 Units Oral Daily          Allergies:     Allergies   Allergen Reactions     Haldol [Haloperidol] Other (See Comments)     Increases pt's hallucinations           Labs:     Recent Results (from the past 24 hour(s))   Glucose by meter    Collection Time: 07/26/19  8:04 AM   Result Value Ref Range    Glucose 113 (H) 70 - 99 mg/dL          Psychiatric Examination:     /75   Pulse 95   Temp 100  F (37.8  C) (Oral)   Resp 16   Ht 1.753 m (5' 9\")   Wt 101.6 kg (224 lb)   SpO2 98%   BMI 33.08 kg/m    Weight is 224 lbs 0 oz  Body mass index is 33.08 kg/m .  Lying Orthostatic BP: 124/84      Lying Orthostatic Pulse: 98 bpm      Sitting Orthostatic BP: 127/79      Sitting Orthostatic Pulse: 105 bpm      Standing Orthostatic BP: 126/82      Standing Orthostatic Pulse: 99 bpm       Weight over time:  Vitals:    06/26/19 1527 06/26/19 2102   Weight: 94.8 kg (209 lb) 101.6 kg (224 lb)       Orthostatic Vitals       Most Recent      Sitting Orthostatic /79 07/25 0832    Sitting Orthostatic Pulse (bpm) 105 07/25 0832            Cardiometabolic risk assessment. 07/26/19      Reviewed patient profile for cardiometabolic risk factors    Date taken /Value  REFERENCE RANGE   Abdominal Obesity  (Waist Circumference)   See nursing flowsheet Women ?35 in (88 cm)   Men ?40 in (102 cm)      Triglycerides  No results found for: TRIG    ?150 mg/dL (1.7 mmol/L) or current treatment for elevated triglycerides   HDL cholesterol  No results found for: HDL]   Women <50 mg/dL (1.3 mmol/L) in women or current treatment for low HDL cholesterol  Men <40 mg/dL (1 mmol/L) in men or current treatment for low HDL cholesterol     Fasting plasma glucose (FPG) Lab Results   Component Value Date     06/26/2019      FPG ?100 mg/dL (5.6 mmol/L) or treatment for elevated blood glucose "   Blood pressure  BP Readings from Last 3 Encounters:   07/25/19 108/75   04/02/19 110/73    Blood pressure ?130/85 mmHg or treatment for elevated blood pressure   Family History  See family history         Brayden is a 54-year-old male with white hair and eyeglasses and is overweight.  His speech is simplistic and of an appropriate rate and tone.  His behavior is appropriate and he does not appear to have any abnormal movements.  His affect is neutral.  His mood he describes as ok.  His thought content consists of the above without thoughts of harming himself or others possible delusions at times.  His thought process is concrete with sometimes experiencing looseness of association.  He may have abnormal perceptions at times.  He is alert but not aware of current circumstances completely.  His attention and concentration is limited to impaired.  His long-term/short-term/remote memory is limited to impaired.  His insight and judgment are both impaired.            Precautions:     Behavioral Orders   Procedures     Assault precautions     Code 1 - Restrict to Unit     Routine Programming     As clinically indicated     Sexual precautions     Status 15     Every 15 minutes.     Status Individual Observation     Male only.  10 feet space restriction from peers and staff. Staff to stay 10 ft when patient in hallway. OK to sit at door when patient in room.     Order Specific Question:   CONTINUOUS 24 hours / day     Answer:   Other     Order Specific Question:   Specify distance     Answer:   Male only.  10 feet space restriction from peers and staff. Staff to stay 10 ft when patient in hallway. OK to sit at door when patient in room.     Order Specific Question:   Indications for SIO     Answer:   Assault risk     Order Specific Question:   Indications for SIO     Answer:   Severe intrusiveness          DIagnoses:     Schizoaffective disorder bipolar type  Mild intellectual disability  History of Tardive  Dyskinesia  History of TBI  Polysubstance use disorder, in remission         Assessment & Plan:     Brayden has been doing reasonably well he did lunge at a staff member however was redirectable.  He does have a baseline of lunging and needing redirection by staff members.  Is not been any hypersexual behaviors recently which is an additional improvement.  He recently had increases in divalproex and we are awaiting benefit.  Unclear if further increases are needed at this time but am certainly considering it.  Hopefully this would improve impulsive behaviors or so that he can be maintained in his group home.    Continue voluntary by guardian    The risks, benefits, alternatives and side effects have been discussed and are understood by the patient and other caregivers.      Cayetano Mosher  [facility]      The following document has been created with voice recognition software and may contain unintentional word substitutions.    Non clinically relevant CMS requirements:  Clinical Global Impressions  First:  Considering your total clinical experience with this particular patient population, how severe are the patient's symptoms at this time?: 6 (06/28/19 2011)  Compared to the patient's condition at the START of treatment, this patient's condition is:: 4 (06/28/19 2011)  Most recent:  Considering your total clinical experience with this particular patient population, how severe are the patient's symptoms at this time?: 5 (07/24/19 1426)  Compared to the patient's condition at the START of treatment, this patient's condition is:: 3 (07/24/19 1426)

## 2019-07-26 NOTE — PROGRESS NOTES
07/25/19 2200   Behavioral Health   Hallucinations auditory   Thinking distractable   Orientation person: oriented   Memory baseline memory   Insight poor   Judgement impaired   Eye Contact at examiner   Affect blunted, flat   Mood mood is calm   Physical Appearance/Attire untidy   Hygiene neglected grooming - unclean body, hair, teeth   Suicidality other (see comments)  (none stated nor observed )   1. Wish to be Dead No   2. Non-Specific Active Suicidal Thoughts  No   Self Injury other (see comment)  (none stated nor observed )   Elopement   (none observed )   Activity isolative;withdrawn   Speech pressured   Medication Sensitivity no stated side effects;no observed side effects   Psychomotor / Gait balanced;steady   Activities of Daily Living   Hygiene/Grooming prompts   Oral Hygiene prompts   Dress scrubs (behavioral health)   Laundry unable to complete   Room Organization prompts   Pt was in their room for almost the entirety of the shift. Pt was isolative and withdrawn. Pt appears to be responding to internal stimuli. No SI or SIB stated or observed. Pt lunged at an RN while the RN was speaking to the pt this evening.

## 2019-07-26 NOTE — PROGRESS NOTES
Patient slept for 5.75 hours, per 15 minutes rounding sheet. He continues on status SIO for assaultive precaution. No behavior issue tonight.

## 2019-07-26 NOTE — PLAN OF CARE
BEHAVIORAL TEAM DISCUSSION    Participants: dr bonilla, eric canada rn, christi coleman [facility]  Progress: The hypersexual behaviors are under better control.   He still, however, has aggressive lunging episodes toward staff.    He had one last night and another on Wed 7-24.  He is directable and apologetic following his aggressive acts.     He is more comfortable spending time in his room instead of in the common lounge area.     Continued Stay Criteria/Rationale:  due to remaining symptoms  Medical/Physical: no new issues  Precautions:   Behavioral Orders   Procedures    Assault precautions    Code 1 - Restrict to Unit    Routine Programming     As clinically indicated    Sexual precautions    Status 15     Every 15 minutes.    Status Individual Observation     Male only.  10 feet space restriction from peers and staff. Staff to stay 10 ft when patient in hallway. OK to sit at door when patient in room.     Order Specific Question:   CONTINUOUS 24 hours / day     Answer:   Other     Order Specific Question:   Specify distance     Answer:   Male only.  10 feet space restriction from peers and staff. Staff to stay 10 ft when patient in hallway. OK to sit at door when patient in room.     Order Specific Question:   Indications for SIO     Answer:   Assault risk     Order Specific Question:   Indications for SIO     Answer:   Severe intrusiveness     Plan:  meds per dr bonilla.    Meeting with group home staff and pt's father/guardian this Tues 7-30 at 1:30pm .   Having a difficult time finding pt a new psychiatrist.  Also, we just learned his [employer] funding closed due to being in the hospital >30 days.   I called the [facility] to request a re-screen.   They went through an entire phone intake from me but now they need to speak with the father because he's the guardian.  He needs to give approval.   I have left a message for him asking him to follow through on this.         Rationale for change in precautions or  plan: no change at this time

## 2019-07-26 NOTE — PLAN OF CARE
Pt had no behavioral outbursts today.  He was calm and cooperative for BG checks, scheduled medications, and Depo-Provera injection.  He continues to endorse AH, but remained safe today.  He reported that he did not sleep well, and was feeling tired. He ate all his meals and denies SI/SIB.

## 2019-07-27 LAB — GLUCOSE BLDC GLUCOMTR-MCNC: 117 MG/DL (ref 70–99)

## 2019-07-27 PROCEDURE — 25000132 ZZH RX MED GY IP 250 OP 250 PS 637: Performed by: PSYCHIATRY & NEUROLOGY

## 2019-07-27 PROCEDURE — 00000146 ZZHCL STATISTIC GLUCOSE BY METER IP

## 2019-07-27 PROCEDURE — 25000132 ZZH RX MED GY IP 250 OP 250 PS 637: Performed by: STUDENT IN AN ORGANIZED HEALTH CARE EDUCATION/TRAINING PROGRAM

## 2019-07-27 PROCEDURE — 12400001 ZZH R&B MH UMMC

## 2019-07-27 RX ADMIN — Medication 1 G: at 19:28

## 2019-07-27 RX ADMIN — METFORMIN HYDROCHLORIDE 1000 MG: 500 TABLET, FILM COATED ORAL at 17:38

## 2019-07-27 RX ADMIN — DIVALPROEX SODIUM 1500 MG: 500 TABLET, FILM COATED, EXTENDED RELEASE ORAL at 08:11

## 2019-07-27 RX ADMIN — CLOZAPINE 450 MG: 100 TABLET ORAL at 19:28

## 2019-07-27 RX ADMIN — DOCUSATE SODIUM 100 MG: 100 CAPSULE, LIQUID FILLED ORAL at 08:11

## 2019-07-27 RX ADMIN — DIVALPROEX SODIUM 1500 MG: 500 TABLET, FILM COATED, EXTENDED RELEASE ORAL at 19:28

## 2019-07-27 RX ADMIN — PROPRANOLOL HYDROCHLORIDE 80 MG: 80 CAPSULE, EXTENDED RELEASE ORAL at 08:11

## 2019-07-27 RX ADMIN — DOCUSATE SODIUM 100 MG: 100 CAPSULE, LIQUID FILLED ORAL at 19:28

## 2019-07-27 RX ADMIN — NIACIN 500 MG: 500 TABLET, FILM COATED, EXTENDED RELEASE ORAL at 19:29

## 2019-07-27 RX ADMIN — MELATONIN 2000 UNITS: at 08:11

## 2019-07-27 RX ADMIN — METFORMIN HYDROCHLORIDE 1000 MG: 500 TABLET, FILM COATED ORAL at 08:11

## 2019-07-27 RX ADMIN — Medication 1 G: at 08:11

## 2019-07-27 RX ADMIN — CLOZAPINE 200 MG: 100 TABLET ORAL at 08:11

## 2019-07-27 RX ADMIN — ARIPIPRAZOLE 10 MG: 10 TABLET ORAL at 08:11

## 2019-07-27 RX ADMIN — ESCITALOPRAM OXALATE 20 MG: 20 TABLET ORAL at 08:11

## 2019-07-27 RX ADMIN — FENOFIBRATE 160 MG: 160 TABLET, FILM COATED ORAL at 19:29

## 2019-07-27 RX ADMIN — POLYETHYLENE GLYCOL 3350 17 G: 17 POWDER, FOR SOLUTION ORAL at 08:11

## 2019-07-27 RX ADMIN — PANTOPRAZOLE SODIUM 20 MG: 20 TABLET, DELAYED RELEASE ORAL at 08:11

## 2019-07-27 RX ADMIN — SIMVASTATIN 40 MG: 20 TABLET, FILM COATED ORAL at 19:29

## 2019-07-27 RX ADMIN — THERA TABS 1 TABLET: TAB at 08:11

## 2019-07-27 ASSESSMENT — ACTIVITIES OF DAILY LIVING (ADL)
ORAL_HYGIENE: INDEPENDENT
HYGIENE/GROOMING: INDEPENDENT
LAUNDRY: WITH SUPERVISION
LAUNDRY: UNABLE TO COMPLETE
HYGIENE/GROOMING: HANDWASHING;INDEPENDENT;SHOWER
ORAL_HYGIENE: INDEPENDENT
DRESS: SCRUBS (BEHAVIORAL HEALTH);INDEPENDENT
DRESS: SCRUBS (BEHAVIORAL HEALTH);INDEPENDENT

## 2019-07-27 NOTE — PROGRESS NOTES
07/26/19 2200   Behavioral Health   Hallucinations auditory   Thinking distractable   Insight admits / accepts   Judgement impaired   Affect blunted, flat   Mood mood is calm   Physical Appearance/Attire untidy   Hygiene neglected grooming - unclean body, hair, teeth   1. Wish to be Dead No   2. Non-Specific Active Suicidal Thoughts  No   Speech coherent   Psychomotor / Gait balanced;steady   Activities of Daily Living   Hygiene/Grooming prompts   Oral Hygiene prompts   Dress scrubs (behavioral health)   Room Organization prompts   Pt was in his room for the shift. He spent most of the time in bed watching TV. He was offered a shower, but he refused. Pt's affect is blunted and he appears responding to internal stimuli. Pt was calm and cooperative with staff.

## 2019-07-27 NOTE — PLAN OF CARE
"RN ASSESSMENT   Patient isolative to self and room. Spent majority of the shift sitting in bed looking at the wall. Showered this shift. He was responsive but very minimally on approach. Maintains restricted affect. Reported mood was \"depressed\" Denied suicidal thinking or thoughts of harming others. He stated that his concentration had improved and he was able to focus on some of his coloring pages for longer periods of time. When asked about his thinking patterns stated, \" I am just playing games with God in my head\"  Endorsed auditory hallucinations. When I asked to elaborate stated, \" you do not want to know\" Compliant with all scheduled medications.   Will continue with SIO due to high risk of unprovoked and unpredictable aggression. Last episode of aggression 7/24. Refer to case manger notes for discharge planing and recommendations. Continue with current treatment plan and recommendations. Continue to monitor and reassess symptoms. Monitor response to medications. Monitor progress towards treatment goals.   "

## 2019-07-28 LAB — GLUCOSE BLDC GLUCOMTR-MCNC: 124 MG/DL (ref 70–99)

## 2019-07-28 PROCEDURE — 25000132 ZZH RX MED GY IP 250 OP 250 PS 637: Performed by: PSYCHIATRY & NEUROLOGY

## 2019-07-28 PROCEDURE — 25000132 ZZH RX MED GY IP 250 OP 250 PS 637: Performed by: STUDENT IN AN ORGANIZED HEALTH CARE EDUCATION/TRAINING PROGRAM

## 2019-07-28 PROCEDURE — 12400001 ZZH R&B MH UMMC

## 2019-07-28 PROCEDURE — 00000146 ZZHCL STATISTIC GLUCOSE BY METER IP

## 2019-07-28 RX ADMIN — PROPRANOLOL HYDROCHLORIDE 80 MG: 80 CAPSULE, EXTENDED RELEASE ORAL at 08:44

## 2019-07-28 RX ADMIN — DOCUSATE SODIUM 100 MG: 100 CAPSULE, LIQUID FILLED ORAL at 08:45

## 2019-07-28 RX ADMIN — CLOZAPINE 200 MG: 100 TABLET ORAL at 08:45

## 2019-07-28 RX ADMIN — DIVALPROEX SODIUM 1500 MG: 500 TABLET, FILM COATED, EXTENDED RELEASE ORAL at 08:44

## 2019-07-28 RX ADMIN — FENOFIBRATE 160 MG: 160 TABLET, FILM COATED ORAL at 19:52

## 2019-07-28 RX ADMIN — METFORMIN HYDROCHLORIDE 1000 MG: 500 TABLET, FILM COATED ORAL at 17:22

## 2019-07-28 RX ADMIN — ESCITALOPRAM OXALATE 20 MG: 20 TABLET ORAL at 08:45

## 2019-07-28 RX ADMIN — ARIPIPRAZOLE 10 MG: 10 TABLET ORAL at 08:46

## 2019-07-28 RX ADMIN — POLYETHYLENE GLYCOL 3350 17 G: 17 POWDER, FOR SOLUTION ORAL at 08:44

## 2019-07-28 RX ADMIN — Medication 1 G: at 08:44

## 2019-07-28 RX ADMIN — METFORMIN HYDROCHLORIDE 1000 MG: 500 TABLET, FILM COATED ORAL at 08:45

## 2019-07-28 RX ADMIN — Medication 1 G: at 19:52

## 2019-07-28 RX ADMIN — DIVALPROEX SODIUM 1500 MG: 500 TABLET, FILM COATED, EXTENDED RELEASE ORAL at 19:52

## 2019-07-28 RX ADMIN — MELATONIN 2000 UNITS: at 08:45

## 2019-07-28 RX ADMIN — NIACIN 500 MG: 500 TABLET, FILM COATED, EXTENDED RELEASE ORAL at 19:52

## 2019-07-28 RX ADMIN — SIMVASTATIN 40 MG: 20 TABLET, FILM COATED ORAL at 19:52

## 2019-07-28 RX ADMIN — CLOZAPINE 450 MG: 100 TABLET ORAL at 19:52

## 2019-07-28 RX ADMIN — THERA TABS 1 TABLET: TAB at 08:46

## 2019-07-28 RX ADMIN — PANTOPRAZOLE SODIUM 20 MG: 20 TABLET, DELAYED RELEASE ORAL at 08:46

## 2019-07-28 RX ADMIN — DOCUSATE SODIUM 100 MG: 100 CAPSULE, LIQUID FILLED ORAL at 19:52

## 2019-07-28 ASSESSMENT — ACTIVITIES OF DAILY LIVING (ADL)
ORAL_HYGIENE: PROMPTS
HYGIENE/GROOMING: PROMPTS
DRESS: SCRUBS (BEHAVIORAL HEALTH)
ORAL_HYGIENE: PROMPTS
DRESS: SCRUBS (BEHAVIORAL HEALTH)
LAUNDRY: UNABLE TO COMPLETE
HYGIENE/GROOMING: PROMPTS

## 2019-07-28 NOTE — PROGRESS NOTES
07/27/19 2154   Behavioral Health   Hallucinations denies / not responding to hallucinations   Thinking poor concentration;distractable   Orientation person: oriented;place: oriented   Memory baseline memory   Insight poor   Judgement impaired   Eye Contact staring   Affect blunted, flat;tense   Mood mood is calm   Physical Appearance/Attire appears stated age;attire appropriate to age and situation;neat   Hygiene well groomed   Suicidality other (see comments)  (BRO)   1. Wish to be Dead   (BRO)   2. Non-Specific Active Suicidal Thoughts    (BRO)   Self Injury other (see comment)  (BRO)   Elopement   (none noted)   Activity withdrawn;isolative   Speech clear;coherent   Medication Sensitivity no stated side effects;no observed side effects   Psychomotor / Gait balanced;steady   Overt Aggression Scale   Verbal Aggression 0   Aggression against Property 0   Auto-Aggression 0   Physical Aggression 0   Overt Aggression Total Score 0   Activities of Daily Living   Hygiene/Grooming independent   Oral Hygiene independent   Dress scrubs (behavioral health);independent   Laundry unable to complete   Room Organization independent     Pt was isolative to his room. No instances of aggression.

## 2019-07-28 NOTE — PROGRESS NOTES
"Patient has been up all shift but has not had the opportunity to be integrated back into the milieu due to patients aggressive tendencies. Patient is unable to articulate why he engages in this negative behavior. Patient denies SI but does endorse AH. Patient states that the voices tell him that he is \"too active\". Patient is oriented to self only. No real change noted in patient's behavior.     07/28/19 1248   Sleep/Rest/Relaxation   Day/Evening Time Hours up all shift   Number of hours resting in bed 7 hours   Behavioral Health   Hallucinations auditory;other (see comment)  (Endorses)   Thinking poor concentration;distractable   Orientation person: oriented   Memory baseline memory   Insight poor   Judgement impaired   Eye Contact out of corner of eyes;at examiner   Affect blunted, flat   Mood depressed;mood is calm   Physical Appearance/Attire untidy;disheveled   Hygiene other (see comment)  (adequate)   Suicidality other (see comments)  (Denies)   1. Wish to be Dead No   2. Non-Specific Active Suicidal Thoughts  No   Self Injury other (see comment)  (No SIB observed)   Elopement   (No behaviors)   Activity isolative;withdrawn;restless;other (see comment)  (per careplan)   Speech other (see comments)  (Delayed one or two word responses)   Medication Sensitivity no stated side effects;no observed side effects   Psychomotor / Gait balanced;steady   Substance Withdrawal Interventions   Social and Therapeutic Interventions (Substance Withdrawal) encourage effective boundaries with peers;encourage socialization with peers;encourage participation in therapeutic groups and milieu activities   Overt Aggression Scale   Verbal Aggression 0   Aggression against Property 0   Auto-Aggression 0   Physical Aggression 0   Overt Aggression Total Score 0   Psycho Education   Type of Intervention 1:1 intervention   Response observes from a distance   Hours 0.5   Treatment Detail Planning  (Check in, patient DD)   Daily Care "   Activity up ad joyce   Activities of Daily Living   Hygiene/Grooming prompts   Oral Hygiene prompts   Dress scrubs (behavioral health)   Laundry unable to complete   Room Organization prompts   Activity   Activity Assistance Provided independent
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
## 2019-07-29 LAB — GLUCOSE BLDC GLUCOMTR-MCNC: 126 MG/DL (ref 70–99)

## 2019-07-29 PROCEDURE — 25000132 ZZH RX MED GY IP 250 OP 250 PS 637: Performed by: PSYCHIATRY & NEUROLOGY

## 2019-07-29 PROCEDURE — 12400001 ZZH R&B MH UMMC

## 2019-07-29 PROCEDURE — 99232 SBSQ HOSP IP/OBS MODERATE 35: CPT | Performed by: PSYCHIATRY & NEUROLOGY

## 2019-07-29 PROCEDURE — 00000146 ZZHCL STATISTIC GLUCOSE BY METER IP

## 2019-07-29 PROCEDURE — 25000132 ZZH RX MED GY IP 250 OP 250 PS 637: Performed by: STUDENT IN AN ORGANIZED HEALTH CARE EDUCATION/TRAINING PROGRAM

## 2019-07-29 RX ADMIN — PANTOPRAZOLE SODIUM 20 MG: 20 TABLET, DELAYED RELEASE ORAL at 08:14

## 2019-07-29 RX ADMIN — METFORMIN HYDROCHLORIDE 1000 MG: 500 TABLET, FILM COATED ORAL at 08:16

## 2019-07-29 RX ADMIN — DOCUSATE SODIUM 100 MG: 100 CAPSULE, LIQUID FILLED ORAL at 08:13

## 2019-07-29 RX ADMIN — POLYETHYLENE GLYCOL 3350 17 G: 17 POWDER, FOR SOLUTION ORAL at 08:13

## 2019-07-29 RX ADMIN — PROPRANOLOL HYDROCHLORIDE 80 MG: 80 CAPSULE, EXTENDED RELEASE ORAL at 08:14

## 2019-07-29 RX ADMIN — SIMVASTATIN 40 MG: 20 TABLET, FILM COATED ORAL at 19:10

## 2019-07-29 RX ADMIN — Medication 1 G: at 19:10

## 2019-07-29 RX ADMIN — METFORMIN HYDROCHLORIDE 1000 MG: 500 TABLET, FILM COATED ORAL at 19:10

## 2019-07-29 RX ADMIN — FENOFIBRATE 160 MG: 160 TABLET, FILM COATED ORAL at 19:10

## 2019-07-29 RX ADMIN — MELATONIN 2000 UNITS: at 08:15

## 2019-07-29 RX ADMIN — THERA TABS 1 TABLET: TAB at 08:13

## 2019-07-29 RX ADMIN — DOCUSATE SODIUM 100 MG: 100 CAPSULE, LIQUID FILLED ORAL at 19:10

## 2019-07-29 RX ADMIN — DIVALPROEX SODIUM 1500 MG: 500 TABLET, FILM COATED, EXTENDED RELEASE ORAL at 08:14

## 2019-07-29 RX ADMIN — CLOZAPINE 200 MG: 100 TABLET ORAL at 08:14

## 2019-07-29 RX ADMIN — Medication 1 G: at 08:14

## 2019-07-29 RX ADMIN — ESCITALOPRAM OXALATE 20 MG: 20 TABLET ORAL at 08:14

## 2019-07-29 RX ADMIN — CLOZAPINE 450 MG: 100 TABLET ORAL at 19:09

## 2019-07-29 RX ADMIN — NIACIN 500 MG: 500 TABLET, FILM COATED, EXTENDED RELEASE ORAL at 19:10

## 2019-07-29 RX ADMIN — DIVALPROEX SODIUM 1500 MG: 500 TABLET, FILM COATED, EXTENDED RELEASE ORAL at 19:10

## 2019-07-29 RX ADMIN — ARIPIPRAZOLE 10 MG: 10 TABLET ORAL at 08:13

## 2019-07-29 ASSESSMENT — ACTIVITIES OF DAILY LIVING (ADL)
DRESS: SCRUBS (BEHAVIORAL HEALTH)
ORAL_HYGIENE: PROMPTS
LAUNDRY: UNABLE TO COMPLETE
HYGIENE/GROOMING: INDEPENDENT;PROMPTS
ORAL_HYGIENE: INDEPENDENT;PROMPTS
HYGIENE/GROOMING: PROMPTS
DRESS: INDEPENDENT

## 2019-07-29 NOTE — PROGRESS NOTES
In room laying in bed napping most of shift. Asks staff for permission to do things such as TV. Listens to staff prompts to get up and walk around room. When asked, pt states they would like to walk in hallway if made available during reflection time. Complains of going to bathroom too much although not observed going to bathroom very frequently. No behavioral concerns this shift.      07/29/19 1300   Behavioral Health   Hallucinations auditory   Thinking poor concentration   Orientation person: oriented;place: oriented   Memory baseline memory   Insight poor   Judgement impaired   Eye Contact at examiner   Affect blunted, flat   Mood mood is calm   Physical Appearance/Attire attire appropriate to age and situation   Hygiene neglected grooming - unclean body, hair, teeth   Suicidality other (see comments)  (denies )   1. Wish to be Dead No   2. Non-Specific Active Suicidal Thoughts  No   Self Injury other (see comment)  (denies )   Elopement   (no indicators )   Activity isolative   Speech clear;coherent   Psychomotor / Gait balanced;steady   Psycho Education   Type of Intervention 1:1 intervention   Response participates with encouragement   Hours 0.5   Treatment Detail check in    Activities of Daily Living   Hygiene/Grooming prompts   Oral Hygiene prompts   Dress independent   Room Organization prompts
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
## 2019-07-29 NOTE — PLAN OF CARE
Pt had no behavioral outburst or aggression this evening.  Patient denied an psychotic symptoms, and reports feeling safe.  He was medication compliant and denies any discomfort.  He ate his meals.

## 2019-07-30 LAB — GLUCOSE BLDC GLUCOMTR-MCNC: 113 MG/DL (ref 70–99)

## 2019-07-30 PROCEDURE — 00000146 ZZHCL STATISTIC GLUCOSE BY METER IP

## 2019-07-30 PROCEDURE — 25000132 ZZH RX MED GY IP 250 OP 250 PS 637: Performed by: PSYCHIATRY & NEUROLOGY

## 2019-07-30 PROCEDURE — 25000132 ZZH RX MED GY IP 250 OP 250 PS 637: Performed by: STUDENT IN AN ORGANIZED HEALTH CARE EDUCATION/TRAINING PROGRAM

## 2019-07-30 PROCEDURE — 99233 SBSQ HOSP IP/OBS HIGH 50: CPT | Performed by: PSYCHIATRY & NEUROLOGY

## 2019-07-30 PROCEDURE — 12400001 ZZH R&B MH UMMC

## 2019-07-30 RX ADMIN — Medication 1 G: at 19:18

## 2019-07-30 RX ADMIN — POLYETHYLENE GLYCOL 3350 17 G: 17 POWDER, FOR SOLUTION ORAL at 09:07

## 2019-07-30 RX ADMIN — METFORMIN HYDROCHLORIDE 1000 MG: 500 TABLET, FILM COATED ORAL at 09:08

## 2019-07-30 RX ADMIN — FENOFIBRATE 160 MG: 160 TABLET, FILM COATED ORAL at 19:18

## 2019-07-30 RX ADMIN — MELATONIN 2000 UNITS: at 09:08

## 2019-07-30 RX ADMIN — THERA TABS 1 TABLET: TAB at 09:07

## 2019-07-30 RX ADMIN — PANTOPRAZOLE SODIUM 20 MG: 20 TABLET, DELAYED RELEASE ORAL at 09:07

## 2019-07-30 RX ADMIN — DOCUSATE SODIUM 100 MG: 100 CAPSULE, LIQUID FILLED ORAL at 19:17

## 2019-07-30 RX ADMIN — Medication 1 G: at 09:08

## 2019-07-30 RX ADMIN — DOCUSATE SODIUM 100 MG: 100 CAPSULE, LIQUID FILLED ORAL at 09:07

## 2019-07-30 RX ADMIN — CLOZAPINE 200 MG: 100 TABLET ORAL at 09:07

## 2019-07-30 RX ADMIN — ARIPIPRAZOLE 10 MG: 10 TABLET ORAL at 09:07

## 2019-07-30 RX ADMIN — METFORMIN HYDROCHLORIDE 1000 MG: 500 TABLET, FILM COATED ORAL at 18:03

## 2019-07-30 RX ADMIN — SIMVASTATIN 40 MG: 20 TABLET, FILM COATED ORAL at 19:17

## 2019-07-30 RX ADMIN — DIVALPROEX SODIUM 1500 MG: 500 TABLET, FILM COATED, EXTENDED RELEASE ORAL at 09:08

## 2019-07-30 RX ADMIN — ESCITALOPRAM OXALATE 20 MG: 20 TABLET ORAL at 09:08

## 2019-07-30 RX ADMIN — NIACIN 500 MG: 500 TABLET, FILM COATED, EXTENDED RELEASE ORAL at 19:18

## 2019-07-30 RX ADMIN — DIVALPROEX SODIUM 1500 MG: 500 TABLET, FILM COATED, EXTENDED RELEASE ORAL at 19:17

## 2019-07-30 RX ADMIN — PROPRANOLOL HYDROCHLORIDE 80 MG: 80 CAPSULE, EXTENDED RELEASE ORAL at 09:08

## 2019-07-30 RX ADMIN — CLOZAPINE 450 MG: 100 TABLET ORAL at 19:17

## 2019-07-30 ASSESSMENT — ACTIVITIES OF DAILY LIVING (ADL)
DRESS: SCRUBS (BEHAVIORAL HEALTH);INDEPENDENT
ORAL_HYGIENE: INDEPENDENT
HYGIENE/GROOMING: INDEPENDENT;PROMPTS
LAUNDRY: UNABLE TO COMPLETE
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
## 2019-07-30 NOTE — PROGRESS NOTES
Pt remained in his room per restriction order and  Napping/sleeping. The writer encourage pt to walk around in his room and pt was compliant with that. Pt ate dinner and went to bed early in the evening. Pt had no significant event this evening. Nothing else to add.        07/29/19 2056   Behavioral Health   Hallucinations denies / not responding to hallucinations   Thinking poor concentration   Orientation person: oriented;place: oriented;situation, disoriented   Memory baseline memory   Insight poor   Judgement impaired   Eye Contact at examiner   Affect full range affect   Mood mood is calm   Physical Appearance/Attire disheveled   Hygiene neglected grooming - unclean body, hair, teeth   Suicidality other (see comments)  (No indications)   1. Wish to be Dead No   2. Non-Specific Active Suicidal Thoughts  No   Self Injury other (see comment)  (No indicarions)   Speech clear;coherent   Medication Sensitivity no stated side effects;no observed side effects   Psychomotor / Gait balanced;steady   Activities of Daily Living   Hygiene/Grooming independent;prompts   Oral Hygiene independent;prompts   Dress scrubs (behavioral health)   Laundry unable to complete   Room Organization independent   Activity   Activity Assistance Provided independent

## 2019-07-31 LAB
BASOPHILS # BLD AUTO: 0.1 10E9/L (ref 0–0.2)
BASOPHILS NFR BLD AUTO: 0.6 %
DIFFERENTIAL METHOD BLD: NORMAL
EOSINOPHIL # BLD AUTO: 0.1 10E9/L (ref 0–0.7)
EOSINOPHIL NFR BLD AUTO: 1.1 %
GLUCOSE BLDC GLUCOMTR-MCNC: 122 MG/DL (ref 70–99)
IMM GRANULOCYTES # BLD: 0.1 10E9/L (ref 0–0.4)
IMM GRANULOCYTES NFR BLD: 1.1 %
LYMPHOCYTES # BLD AUTO: 3.8 10E9/L (ref 0.8–5.3)
LYMPHOCYTES NFR BLD AUTO: 41.1 %
MONOCYTES # BLD AUTO: 1.1 10E9/L (ref 0–1.3)
MONOCYTES NFR BLD AUTO: 12.1 %
NEUTROPHILS # BLD AUTO: 4.1 10E9/L (ref 1.6–8.3)
NEUTROPHILS NFR BLD AUTO: 44 %
NRBC # BLD AUTO: 0 10*3/UL
NRBC BLD AUTO-RTO: 0 /100
WBC # BLD AUTO: 9.4 10E9/L (ref 4–11)

## 2019-07-31 PROCEDURE — 25000132 ZZH RX MED GY IP 250 OP 250 PS 637: Performed by: STUDENT IN AN ORGANIZED HEALTH CARE EDUCATION/TRAINING PROGRAM

## 2019-07-31 PROCEDURE — 00000146 ZZHCL STATISTIC GLUCOSE BY METER IP

## 2019-07-31 PROCEDURE — 85048 AUTOMATED LEUKOCYTE COUNT: CPT | Performed by: PSYCHIATRY & NEUROLOGY

## 2019-07-31 PROCEDURE — 99232 SBSQ HOSP IP/OBS MODERATE 35: CPT | Performed by: PSYCHIATRY & NEUROLOGY

## 2019-07-31 PROCEDURE — 25000132 ZZH RX MED GY IP 250 OP 250 PS 637: Performed by: PSYCHIATRY & NEUROLOGY

## 2019-07-31 PROCEDURE — 36415 COLL VENOUS BLD VENIPUNCTURE: CPT | Performed by: PSYCHIATRY & NEUROLOGY

## 2019-07-31 PROCEDURE — 12400001 ZZH R&B MH UMMC

## 2019-07-31 PROCEDURE — 85004 AUTOMATED DIFF WBC COUNT: CPT | Performed by: PSYCHIATRY & NEUROLOGY

## 2019-07-31 RX ADMIN — DOCUSATE SODIUM 100 MG: 100 CAPSULE, LIQUID FILLED ORAL at 19:47

## 2019-07-31 RX ADMIN — MELATONIN 2000 UNITS: at 08:29

## 2019-07-31 RX ADMIN — FENOFIBRATE 160 MG: 160 TABLET, FILM COATED ORAL at 19:47

## 2019-07-31 RX ADMIN — DIVALPROEX SODIUM 1500 MG: 500 TABLET, FILM COATED, EXTENDED RELEASE ORAL at 19:47

## 2019-07-31 RX ADMIN — THERA TABS 1 TABLET: TAB at 08:29

## 2019-07-31 RX ADMIN — ARIPIPRAZOLE 10 MG: 10 TABLET ORAL at 08:29

## 2019-07-31 RX ADMIN — METFORMIN HYDROCHLORIDE 1000 MG: 500 TABLET, FILM COATED ORAL at 18:30

## 2019-07-31 RX ADMIN — PANTOPRAZOLE SODIUM 20 MG: 20 TABLET, DELAYED RELEASE ORAL at 08:29

## 2019-07-31 RX ADMIN — POLYETHYLENE GLYCOL 3350 17 G: 17 POWDER, FOR SOLUTION ORAL at 08:28

## 2019-07-31 RX ADMIN — PROPRANOLOL HYDROCHLORIDE 80 MG: 80 CAPSULE, EXTENDED RELEASE ORAL at 08:29

## 2019-07-31 RX ADMIN — METFORMIN HYDROCHLORIDE 1000 MG: 500 TABLET, FILM COATED ORAL at 08:29

## 2019-07-31 RX ADMIN — Medication 1 G: at 08:29

## 2019-07-31 RX ADMIN — ESCITALOPRAM OXALATE 20 MG: 20 TABLET ORAL at 08:29

## 2019-07-31 RX ADMIN — DIVALPROEX SODIUM 1500 MG: 500 TABLET, FILM COATED, EXTENDED RELEASE ORAL at 08:29

## 2019-07-31 RX ADMIN — SIMVASTATIN 40 MG: 20 TABLET, FILM COATED ORAL at 19:46

## 2019-07-31 RX ADMIN — CLOZAPINE 450 MG: 100 TABLET ORAL at 19:47

## 2019-07-31 RX ADMIN — CLOZAPINE 200 MG: 100 TABLET ORAL at 08:29

## 2019-07-31 RX ADMIN — NIACIN 500 MG: 500 TABLET, FILM COATED, EXTENDED RELEASE ORAL at 19:47

## 2019-07-31 RX ADMIN — DOCUSATE SODIUM 100 MG: 100 CAPSULE, LIQUID FILLED ORAL at 08:29

## 2019-07-31 RX ADMIN — Medication 1 G: at 19:47

## 2019-07-31 ASSESSMENT — ACTIVITIES OF DAILY LIVING (ADL)
HYGIENE/GROOMING: INDEPENDENT;PROMPTS
ORAL_HYGIENE: INDEPENDENT;PROMPTS
ORAL_HYGIENE: INDEPENDENT;PROMPTS
LAUNDRY: UNABLE TO COMPLETE
HYGIENE/GROOMING: INDEPENDENT;PROMPTS
LAUNDRY: UNABLE TO COMPLETE
DRESS: INDEPENDENT;PROMPTS
DRESS: SCRUBS (BEHAVIORAL HEALTH);INDEPENDENT

## 2019-07-31 NOTE — PROGRESS NOTES
I scheduled an appt for pt to see Aleshia Ann in [place].    I left her with Dr Mosher's pager number in case she wants clarification about the Depo Provera injection.    Yesterday, we (Dr. Mosher and I) met with pt's group home staff and father/guardian.    The care conference included discussion of treatment issues and follow up care.    We are still awaiting the [facility] re-screen.  I place a call to Faisal Potter asking for a status update on that.   I left a message asking for a return call.

## 2019-07-31 NOTE — PROGRESS NOTES
07/31/19 1400   Behavioral Health   Hallucinations denies / not responding to hallucinations   Thinking poor concentration   Orientation person: oriented   Memory baseline memory   Insight poor   Judgement impaired   Eye Contact at examiner   Affect blunted, flat   Mood mood is calm   Physical Appearance/Attire disheveled;untidy   Hygiene neglected grooming - unclean body, hair, teeth   Suicidality other (see comments)  (denies)   1. Wish to be Dead No   2. Non-Specific Active Suicidal Thoughts  No   Self Injury other (see comment)  (denies)   Elopement   (no value)   Activity isolative;withdrawn   Speech clear;coherent   Medication Sensitivity no observed side effects;no stated side effects   Psychomotor / Gait balanced;steady   Activities of Daily Living   Hygiene/Grooming independent;prompts   Oral Hygiene independent;prompts   Dress independent;prompts   Laundry unable to complete   Room Organization independent       Patient is calm and cooperative. Pt isolative in room and pt started to use the bike in his room and started walking once an hour. No SI or SIB.

## 2019-07-31 NOTE — PROGRESS NOTES
Pt isolated to his room majority of this shift. Pt did shower, with no incident. Pt ate his dinner in room. Pt affect blunted and flat, mood was calm. Pt denies SI/SIB, denies AH/VH. There were no behavioral incidents during this shift.     07/30/19 2200   Behavioral Health   Hallucinations denies / not responding to hallucinations   Thinking poor concentration;distractable   Orientation person: oriented;place: oriented;time: oriented   Memory baseline memory   Insight poor   Judgement impaired   Eye Contact at examiner   Affect blunted, flat   Mood mood is calm   Physical Appearance/Attire disheveled   Hygiene other (see comment)  (adequate)   Suicidality other (see comments)  (denies)   1. Wish to be Dead No   2. Non-Specific Active Suicidal Thoughts  No   Self Injury other (see comment)  (none stated; none observed)   Elopement   (none observed)   Activity isolative;withdrawn   Speech clear;coherent   Medication Sensitivity no stated side effects;no observed side effects   Psychomotor / Gait balanced;steady   Psycho Education   Type of Intervention 1:1 intervention   Response participates with encouragement   Hours 0.5   Treatment Detail check-in   Activities of Daily Living   Hygiene/Grooming independent;prompts   Oral Hygiene independent   Dress scrubs (behavioral health);independent   Laundry unable to complete   Room Organization prompts

## 2019-07-31 NOTE — PROGRESS NOTES
[facility], [place]   Psychiatric Progress Note  Brayden Adrian  [id number]  07/31/19    Chief Complaint: Continued medical care          Interim History:   The patient's care was discussed with the treatment team during the daily team meeting and/or staff's chart notes were reviewed.  Staff report patient has had some tachycardia at 124 primarily staying to himself in his room mentions some body aches and slept about 5 hours.    Upon visiting with him he says that he is feeling good he does mention that he is having trouble pooping and might have constipation.  Nursing staff and I have discussed of this and would like to get him moving and see if that is beneficial.  He is interested in going back to his group home and is looking forward to it and had a good visit with them yesterday.  He is not hopeless or helpless or having any thoughts of harming himself or others.  He is not interested in grabbing anyone or harming anyone such as rape.  He is not having any hallucinations or paranoia and mentions that he tells stories about God telling him things.  Mentions God told him the story about him possibly being dead.  He does say odd things about his spirits and other things similar to this.         Medications:       ARIPiprazole  10 mg Oral Daily     cloZAPine  200 mg Oral QAM     cloZAPine  450 mg Oral At Bedtime     DIADERM FOOT REJUVENATING   Apply externally Daily     divalproex sodium extended-release  1,500 mg Oral BID     docusate sodium  100 mg Oral BID     escitalopram  20 mg Oral Daily     fenofibrate  160 mg Oral QPM     fish oil-omega-3 fatty acids  1 g Oral BID     medroxyPROGESTERone  300 mg Intramuscular Q7 Days     metFORMIN  1,000 mg Oral BID w/meals     multivitamin, therapeutic  1 tablet Oral Daily     niacin ER  500 mg Oral At Bedtime     pantoprazole  20 mg Oral Daily     polyethylene glycol  17 g Oral Daily     propranolol ER  80 mg Oral Daily     simvastatin  40  "mg Oral At Bedtime     vitamin D3  2,000 Units Oral Daily          Allergies:     Allergies   Allergen Reactions     Haldol [Haloperidol] Other (See Comments)     Increases pt's hallucinations           Labs:     Recent Results (from the past 24 hour(s))   Glucose by meter    Collection Time: 07/31/19  8:20 AM   Result Value Ref Range    Glucose 122 (H) 70 - 99 mg/dL   WBC and differential    Collection Time: 07/31/19  8:58 AM   Result Value Ref Range    WBC 9.4 4.0 - 11.0 10e9/L    Diff Method Automated Method     % Neutrophils 44.0 %    % Lymphocytes 41.1 %    % Monocytes 12.1 %    % Eosinophils 1.1 %    % Basophils 0.6 %    % Immature Granulocytes 1.1 %    Nucleated RBCs 0 0 /100    Absolute Neutrophil 4.1 1.6 - 8.3 10e9/L    Absolute Lymphocytes 3.8 0.8 - 5.3 10e9/L    Absolute Monocytes 1.1 0.0 - 1.3 10e9/L    Absolute Eosinophils 0.1 0.0 - 0.7 10e9/L    Absolute Basophils 0.1 0.0 - 0.2 10e9/L    Abs Immature Granulocytes 0.1 0 - 0.4 10e9/L    Absolute Nucleated RBC 0.0           Psychiatric Examination:     BP (!) 135/91 (BP Location: Right arm)   Pulse 90   Temp 99.4  F (37.4  C) (Oral)   Resp 16   Ht 1.753 m (5' 9\")   Wt 101.6 kg (224 lb)   SpO2 98%   BMI 33.08 kg/m    Weight is 224 lbs 0 oz  Body mass index is 33.08 kg/m .  Lying Orthostatic BP: 107/73      Lying Orthostatic Pulse: 91 bpm      Sitting Orthostatic BP: 119/77      Sitting Orthostatic Pulse: 124 bpm      Standing Orthostatic BP: 126/82      Standing Orthostatic Pulse: 99 bpm       Weight over time:  Vitals:    06/26/19 1527 06/26/19 2102   Weight: 94.8 kg (209 lb) 101.6 kg (224 lb)       Orthostatic Vitals       Most Recent      Lying Orthostatic /73 07/30 0800    Lying Orthostatic Pulse (bpm) 91 07/30 0800    Sitting Orthostatic /77 07/30 1909    Sitting Orthostatic Pulse (bpm) 124 07/30 1909            Cardiometabolic risk assessment. 07/31/19      Reviewed patient profile for cardiometabolic risk factors    Date taken " /Value  REFERENCE RANGE   Abdominal Obesity  (Waist Circumference)   See nursing flowsheet Women ?35 in (88 cm)   Men ?40 in (102 cm)      Triglycerides  No results found for: TRIG    ?150 mg/dL (1.7 mmol/L) or current treatment for elevated triglycerides   HDL cholesterol  No results found for: HDL]   Women <50 mg/dL (1.3 mmol/L) in women or current treatment for low HDL cholesterol  Men <40 mg/dL (1 mmol/L) in men or current treatment for low HDL cholesterol     Fasting plasma glucose (FPG) Lab Results   Component Value Date     06/26/2019      FPG ?100 mg/dL (5.6 mmol/L) or treatment for elevated blood glucose   Blood pressure  BP Readings from Last 3 Encounters:   07/31/19 (!) 135/91   04/02/19 110/73    Blood pressure ?130/85 mmHg or treatment for elevated blood pressure   Family History  See family history         Brayden is a 54-year-old male with white hair and eyeglasses and is overweight.  His speech is simplistic and of an appropriate rate and tone.  His behavior is appropriate and he does not appear to have any abnormal movements.  His affect is neutral.  His mood he describes as good.  His thought content consists of the above without thoughts of harming himself or others possible delusions at times.  His thought process is concrete with sometimes experiencing looseness of association.  He may have abnormal perceptions at times.  He is alert but not aware of current circumstances completely.  His attention and concentration is limited to impaired.  His long-term/short-term/remote memory is limited to impaired.  His insight and judgment are both impaired.            Precautions:     Behavioral Orders   Procedures     Assault precautions     Code 1 - Restrict to Unit     Routine Programming     As clinically indicated     Sexual precautions     Status 15     Every 15 minutes.     Status Individual Observation     Male only.  10 feet space restriction from peers and staff. Staff to stay 10 ft when  patient in hallway. OK to sit at door when patient in room.     Order Specific Question:   CONTINUOUS 24 hours / day     Answer:   Other     Order Specific Question:   Specify distance     Answer:   Male only.  10 feet space restriction from peers and staff. Staff to stay 10 ft when patient in hallway. OK to sit at door when patient in room.     Order Specific Question:   Indications for SIO     Answer:   Assault risk     Order Specific Question:   Indications for SIO     Answer:   Severe intrusiveness          DIagnoses:     Schizoaffective disorder bipolar type  Mild intellectual disability  History of Tardive Dyskinesia  History of TBI  Polysubstance use disorder, in remission         Assessment & Plan:     Brayden has not shown any aggressive behaviors recently though he primarily has been staying to himself in his room.  He did well with the visits with his group home staff and his father yesterday.  He seems to be at his baseline of function and we await funding for his group home so that he can discharge from the hospital.  I am still awaiting a callback from his outpatient psychiatrist.    Continue voluntary by guardian    The risks, benefits, alternatives and side effects have been discussed and are understood by the patient and other caregivers.      Cayetano Mosher  [facility] Psychiatry      The following document has been created with voice recognition software and may contain unintentional word substitutions.    Non clinically relevant CMS requirements:  Clinical Global Impressions  First:  Considering your total clinical experience with this particular patient population, how severe are the patient's symptoms at this time?: 6 (06/28/19 2011)  Compared to the patient's condition at the START of treatment, this patient's condition is:: 4 (06/28/19 2011)  Most recent:  Considering your total clinical experience with this particular patient population, how severe are the patient's  symptoms at this time?: 5 (07/24/19 1426)  Compared to the patient's condition at the START of treatment, this patient's condition is:: 3 (07/24/19 1426)

## 2019-08-01 LAB — GLUCOSE BLDC GLUCOMTR-MCNC: 133 MG/DL (ref 70–99)

## 2019-08-01 PROCEDURE — 25000132 ZZH RX MED GY IP 250 OP 250 PS 637: Performed by: PSYCHIATRY & NEUROLOGY

## 2019-08-01 PROCEDURE — 25000132 ZZH RX MED GY IP 250 OP 250 PS 637: Performed by: STUDENT IN AN ORGANIZED HEALTH CARE EDUCATION/TRAINING PROGRAM

## 2019-08-01 PROCEDURE — 12400001 ZZH R&B MH UMMC

## 2019-08-01 PROCEDURE — 00000146 ZZHCL STATISTIC GLUCOSE BY METER IP

## 2019-08-01 RX ADMIN — Medication 1 G: at 20:19

## 2019-08-01 RX ADMIN — METFORMIN HYDROCHLORIDE 1000 MG: 500 TABLET, FILM COATED ORAL at 20:19

## 2019-08-01 RX ADMIN — PANTOPRAZOLE SODIUM 20 MG: 20 TABLET, DELAYED RELEASE ORAL at 07:59

## 2019-08-01 RX ADMIN — CLOZAPINE 450 MG: 100 TABLET ORAL at 20:18

## 2019-08-01 RX ADMIN — POLYETHYLENE GLYCOL 3350 17 G: 17 POWDER, FOR SOLUTION ORAL at 07:59

## 2019-08-01 RX ADMIN — PROPRANOLOL HYDROCHLORIDE 80 MG: 80 CAPSULE, EXTENDED RELEASE ORAL at 07:59

## 2019-08-01 RX ADMIN — FENOFIBRATE 160 MG: 160 TABLET, FILM COATED ORAL at 20:19

## 2019-08-01 RX ADMIN — METFORMIN HYDROCHLORIDE 1000 MG: 500 TABLET, FILM COATED ORAL at 07:59

## 2019-08-01 RX ADMIN — CLOZAPINE 200 MG: 100 TABLET ORAL at 07:59

## 2019-08-01 RX ADMIN — ESCITALOPRAM OXALATE 20 MG: 20 TABLET ORAL at 07:59

## 2019-08-01 RX ADMIN — ARIPIPRAZOLE 10 MG: 10 TABLET ORAL at 07:59

## 2019-08-01 RX ADMIN — Medication 1 G: at 07:59

## 2019-08-01 RX ADMIN — DIVALPROEX SODIUM 1500 MG: 500 TABLET, FILM COATED, EXTENDED RELEASE ORAL at 20:19

## 2019-08-01 RX ADMIN — DIVALPROEX SODIUM 1500 MG: 500 TABLET, FILM COATED, EXTENDED RELEASE ORAL at 07:59

## 2019-08-01 RX ADMIN — MELATONIN 2000 UNITS: at 07:59

## 2019-08-01 RX ADMIN — DOCUSATE SODIUM 100 MG: 100 CAPSULE, LIQUID FILLED ORAL at 20:19

## 2019-08-01 RX ADMIN — NIACIN 500 MG: 500 TABLET, FILM COATED, EXTENDED RELEASE ORAL at 20:19

## 2019-08-01 RX ADMIN — SIMVASTATIN 40 MG: 20 TABLET, FILM COATED ORAL at 20:19

## 2019-08-01 RX ADMIN — DOCUSATE SODIUM 100 MG: 100 CAPSULE, LIQUID FILLED ORAL at 07:59

## 2019-08-01 RX ADMIN — THERA TABS 1 TABLET: TAB at 07:59

## 2019-08-01 ASSESSMENT — ACTIVITIES OF DAILY LIVING (ADL)
ORAL_HYGIENE: INDEPENDENT;PROMPTS
HYGIENE/GROOMING: INDEPENDENT;PROMPTS
LAUNDRY: UNABLE TO COMPLETE
DRESS: SCRUBS (BEHAVIORAL HEALTH)
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
## 2019-08-02 LAB — GLUCOSE BLDC GLUCOMTR-MCNC: 173 MG/DL (ref 70–99)

## 2019-08-02 PROCEDURE — 00000146 ZZHCL STATISTIC GLUCOSE BY METER IP

## 2019-08-02 PROCEDURE — 25000132 ZZH RX MED GY IP 250 OP 250 PS 637: Performed by: PSYCHIATRY & NEUROLOGY

## 2019-08-02 PROCEDURE — 25000128 H RX IP 250 OP 636: Performed by: STUDENT IN AN ORGANIZED HEALTH CARE EDUCATION/TRAINING PROGRAM

## 2019-08-02 PROCEDURE — 99232 SBSQ HOSP IP/OBS MODERATE 35: CPT | Performed by: PSYCHIATRY & NEUROLOGY

## 2019-08-02 PROCEDURE — 12400001 ZZH R&B MH UMMC

## 2019-08-02 PROCEDURE — 25000132 ZZH RX MED GY IP 250 OP 250 PS 637: Performed by: STUDENT IN AN ORGANIZED HEALTH CARE EDUCATION/TRAINING PROGRAM

## 2019-08-02 RX ADMIN — FENOFIBRATE 160 MG: 160 TABLET, FILM COATED ORAL at 19:43

## 2019-08-02 RX ADMIN — SIMVASTATIN 40 MG: 20 TABLET, FILM COATED ORAL at 19:43

## 2019-08-02 RX ADMIN — DOCUSATE SODIUM 100 MG: 100 CAPSULE, LIQUID FILLED ORAL at 08:33

## 2019-08-02 RX ADMIN — DIVALPROEX SODIUM 1500 MG: 500 TABLET, FILM COATED, EXTENDED RELEASE ORAL at 08:33

## 2019-08-02 RX ADMIN — POLYETHYLENE GLYCOL 3350 17 G: 17 POWDER, FOR SOLUTION ORAL at 08:33

## 2019-08-02 RX ADMIN — ARIPIPRAZOLE 10 MG: 10 TABLET ORAL at 08:32

## 2019-08-02 RX ADMIN — PROPRANOLOL HYDROCHLORIDE 80 MG: 80 CAPSULE, EXTENDED RELEASE ORAL at 08:33

## 2019-08-02 RX ADMIN — Medication 1 G: at 19:43

## 2019-08-02 RX ADMIN — CLOZAPINE 450 MG: 100 TABLET ORAL at 19:43

## 2019-08-02 RX ADMIN — MELATONIN 2000 UNITS: at 08:33

## 2019-08-02 RX ADMIN — NIACIN 500 MG: 500 TABLET, FILM COATED, EXTENDED RELEASE ORAL at 19:43

## 2019-08-02 RX ADMIN — PANTOPRAZOLE SODIUM 20 MG: 20 TABLET, DELAYED RELEASE ORAL at 08:33

## 2019-08-02 RX ADMIN — CLOZAPINE 200 MG: 100 TABLET ORAL at 08:33

## 2019-08-02 RX ADMIN — DIVALPROEX SODIUM 1500 MG: 500 TABLET, FILM COATED, EXTENDED RELEASE ORAL at 19:43

## 2019-08-02 RX ADMIN — METFORMIN HYDROCHLORIDE 1000 MG: 500 TABLET, FILM COATED ORAL at 18:19

## 2019-08-02 RX ADMIN — Medication 1 G: at 08:33

## 2019-08-02 RX ADMIN — MEDROXYPROGESTERONE ACETATE 300 MG: 150 INJECTION, SUSPENSION INTRAMUSCULAR at 14:26

## 2019-08-02 RX ADMIN — ESCITALOPRAM OXALATE 20 MG: 20 TABLET ORAL at 08:33

## 2019-08-02 RX ADMIN — DOCUSATE SODIUM 100 MG: 100 CAPSULE, LIQUID FILLED ORAL at 19:43

## 2019-08-02 RX ADMIN — THERA TABS 1 TABLET: TAB at 08:33

## 2019-08-02 RX ADMIN — METFORMIN HYDROCHLORIDE 1000 MG: 500 TABLET, FILM COATED ORAL at 08:33

## 2019-08-02 ASSESSMENT — ACTIVITIES OF DAILY LIVING (ADL)
ORAL_HYGIENE: PROMPTS
DRESS: SCRUBS (BEHAVIORAL HEALTH)
ORAL_HYGIENE: INDEPENDENT
LAUNDRY: WITH SUPERVISION
LAUNDRY: UNABLE TO COMPLETE
HYGIENE/GROOMING: INDEPENDENT
DRESS: SCRUBS (BEHAVIORAL HEALTH)
HYGIENE/GROOMING: PROMPTS

## 2019-08-02 NOTE — PROGRESS NOTES
[facility], [place]   Psychiatric Progress Note  Brayden Adrian  [id number]  08/02/19    Chief Complaint: Continued medical care          Interim History:   The patient's care was discussed with the treatment team during the daily team meeting and/or staff's chart notes were reviewed.  Staff report patient has been keeping to himself in his room watching television says that he put a prize on his soul.  Did lunge at staff members one time but was easily redirectable and slept about 4 hours.    Upon visiting with him he says that he is feeling good he denies any thoughts of harming himself or others any grabbing of people or rape thoughts.  No hopelessness or helplessness or energy problems but does have some attention concentration deficits and disorganization.  No sleep problems no hopelessness or helplessness and asks when he can go back to his group home.  He is still drawing  people and has written mating versus sex which is better.  He was unable to describe what the difference was.  No current anxiety or concerns about his safety.  He was informed about current circumstances getting him back to the group home.  I did ask him to walk about his room and get some exercise.  He asked how many times I said 50 and he compromised and said 10.  Laughing about the conversation while doing so.         Medications:       ARIPiprazole  10 mg Oral Daily     cloZAPine  200 mg Oral QAM     cloZAPine  450 mg Oral At Bedtime     DIADERM FOOT REJUVENATING   Apply externally Daily     divalproex sodium extended-release  1,500 mg Oral BID     docusate sodium  100 mg Oral BID     escitalopram  20 mg Oral Daily     fenofibrate  160 mg Oral QPM     fish oil-omega-3 fatty acids  1 g Oral BID     medroxyPROGESTERone  300 mg Intramuscular Q7 Days     metFORMIN  1,000 mg Oral BID w/meals     multivitamin, therapeutic  1 tablet Oral Daily     niacin ER  500 mg Oral At Bedtime     pantoprazole  20 mg  "Oral Daily     polyethylene glycol  17 g Oral Daily     propranolol ER  80 mg Oral Daily     simvastatin  40 mg Oral At Bedtime     vitamin D3  2,000 Units Oral Daily          Allergies:     Allergies   Allergen Reactions     Haldol [Haloperidol] Other (See Comments)     Increases pt's hallucinations           Labs:   No results found for this or any previous visit (from the past 24 hour(s)).       Psychiatric Examination:     /70 (BP Location: Right arm)   Pulse 96   Temp 98.3  F (36.8  C) (Oral)   Resp 16   Ht 1.753 m (5' 9\")   Wt 101.6 kg (224 lb)   SpO2 98%   BMI 33.08 kg/m    Weight is 224 lbs 0 oz  Body mass index is 33.08 kg/m .  Lying Orthostatic BP: 121/79      Lying Orthostatic Pulse: 97 bpm      Sitting Orthostatic BP: 119/77      Sitting Orthostatic Pulse: 124 bpm      Standing Orthostatic BP: 126/82      Standing Orthostatic Pulse: 99 bpm       Weight over time:  Vitals:    06/26/19 1527 06/26/19 2102   Weight: 94.8 kg (209 lb) 101.6 kg (224 lb)       Orthostatic Vitals       Most Recent      Lying Orthostatic /79 08/02 0835    Lying Orthostatic Pulse (bpm) 97 08/02 0835            Cardiometabolic risk assessment. 08/02/19      Reviewed patient profile for cardiometabolic risk factors    Date taken /Value  REFERENCE RANGE   Abdominal Obesity  (Waist Circumference)   See nursing flowsheet Women ?35 in (88 cm)   Men ?40 in (102 cm)      Triglycerides  No results found for: TRIG    ?150 mg/dL (1.7 mmol/L) or current treatment for elevated triglycerides   HDL cholesterol  No results found for: HDL]   Women <50 mg/dL (1.3 mmol/L) in women or current treatment for low HDL cholesterol  Men <40 mg/dL (1 mmol/L) in men or current treatment for low HDL cholesterol     Fasting plasma glucose (FPG) Lab Results   Component Value Date     06/26/2019      FPG ?100 mg/dL (5.6 mmol/L) or treatment for elevated blood glucose   Blood pressure  BP Readings from Last 3 Encounters:   08/01/19 103/70 "   04/02/19 110/73    Blood pressure ?130/85 mmHg or treatment for elevated blood pressure   Family History  See family history         Brayden is a 54-year-old male with white hair and eyeglasses and is overweight.  His speech is simplistic and of an appropriate rate and tone.  His behavior is appropriate and he does not appear to have any abnormal movements.  His affect is neutral.  His mood he describes as good.  His thought content consists of the above without thoughts of harming himself or others possible delusions at times.  His thought process is concrete with sometimes experiencing looseness of association.  He may have abnormal perceptions at times.  He is alert but not aware of current circumstances completely.  His attention and concentration is limited to impaired.  His long-term/short-term/remote memory is limited to impaired.  His insight and judgment are both impaired.            Precautions:     Behavioral Orders   Procedures     Assault precautions     Code 1 - Restrict to Unit     Routine Programming     As clinically indicated     Sexual precautions     Status 15     Every 15 minutes.     Status Individual Observation     Male only.  10 feet space restriction from peers and staff. Staff to stay 10 ft when patient in hallway. OK to sit at door when patient in room.     Order Specific Question:   CONTINUOUS 24 hours / day     Answer:   Other     Order Specific Question:   Specify distance     Answer:   Male only.  10 feet space restriction from peers and staff. Staff to stay 10 ft when patient in hallway. OK to sit at door when patient in room.     Order Specific Question:   Indications for SIO     Answer:   Assault risk     Order Specific Question:   Indications for SIO     Answer:   Severe intrusiveness          DIagnoses:     Schizoaffective disorder bipolar type  Mild intellectual disability  History of Tardive Dyskinesia  History of TBI  Polysubstance use disorder, in remission          Assessment & Plan:     Brayden has not shown any extremely aggressive behaviors or or sexual behaviors.  He appears to be at his usual baseline of function actually is relatively pleasant to interact with today.  He jokes about his lack of exercise and tries to reduce the amount of exercise and I would like him to do during the day.  He asks without my prompting about going back to the group home and seems to be ready to leave.  I attempted to call his outpatient psychiatrist twice today.    Continue voluntary by guardian    The risks, benefits, alternatives and side effects have been discussed and are understood by the patient and other caregivers.      Cayetano Mosher  [facility]iat[facility]      The following document has been created with voice recognition software and may contain unintentional word substitutions.    Non clinically relevant CMS requirements:  Clinical Global Impressions  First:  Considering your total clinical experience with this particular patient population, how severe are the patient's symptoms at this time?: 6 (06/28/19 2011)  Compared to the patient's condition at the START of treatment, this patient's condition is:: 4 (06/28/19 2011)  Most recent:  Considering your total clinical experience with this particular patient population, how severe are the patient's symptoms at this time?: 1 (08/02/19 1435)  Compared to the patient's condition at the START of treatment, this patient's condition is:: 1 (08/02/19 1435)

## 2019-08-02 NOTE — PROGRESS NOTES
BG deferred at this time. Writer attempted pt's BG X3 and due to BG machine mechanicals, BG would not work.

## 2019-08-02 NOTE — PROGRESS NOTES
Pt remained in his room all shift. He watched television, ate his dinner, and napped throughout the shift. There were no behavioral issues when staff entered and exited his room.

## 2019-08-02 NOTE — PROGRESS NOTES
I have called and left messages numerous times this week about the status of the [facility] re-screen.     The only name and number I have is that of the  who took the initial info.   Dulce Maria [employer] at [phone].  Again, I asked to call back asap.

## 2019-08-02 NOTE — PLAN OF CARE
BEHAVIORAL TEAM DISCUSSION    Participants:   dr bonilla, dipika sarmiento rn, christi coleman [facility]  Progress:   Some progress.   Although he does remain preoccupied with sexual themes, he is not acting out in a sexually aggressive manner.  He is, however, acting out aggressively.   He charges at staff in an aggressive manner.  Med compliant.   Spends most time in room because he is not comfortable around others in [facility] area.  Continued Stay Criteria/Rationale: due to behavior and waiting for [facility] funding.  Medical/Physical:  no new issues.  Precautions:   Behavioral Orders   Procedures    Assault precautions    Code 1 - Restrict to Unit    Routine Programming     As clinically indicated    Sexual precautions    Status 15     Every 15 minutes.    Status Individual Observation     Male only.  10 feet space restriction from peers and staff. Staff to stay 10 ft when patient in hallway. OK to sit at door when patient in room.     Order Specific Question:   CONTINUOUS 24 hours / day     Answer:   Other     Order Specific Question:   Specify distance     Answer:   Male only.  10 feet space restriction from peers and staff. Staff to stay 10 ft when patient in hallway. OK to sit at door when patient in room.     Order Specific Question:   Indications for SIO     Answer:   Assault risk     Order Specific Question:   Indications for SIO     Answer:   Severe intrusiveness     Plan: meds per dr bonilla.    We had a care conference this past week in which we reviewed treatment issues and discharge planning.  Those present were two group home staff, guardian/father, Dr Bonilla, and I.  The plan is to discharge pt back to his group home when both his behavior is stable enough to do so and when the [facility] funding is available.      Rationale for change in precautions or plan:  no change at this time

## 2019-08-02 NOTE — PLAN OF CARE
"48 hour nursing assessment completed. Patient remains on SIO 1:1 fpr safety. Patient isolative to his room all shift. Patient cooperative with afternoon Depo injection. Patient ate meals approprietly and watched TV in his room all shift. Denies offers for ADLs. \"hum diddy dum\" was his response to asking him how are you. He states he slept poorly because he was \"up all night studying biology\". Patient has pictures in his room he mariela of \" people\" with \"mate or sex\" written all over that page along with small squiggly drawings, patient claims are \" people\". Denies SI/SIB. Medication compliant. Continue to monitor and assess.   "

## 2019-08-03 LAB — GLUCOSE BLDC GLUCOMTR-MCNC: 119 MG/DL (ref 70–99)

## 2019-08-03 PROCEDURE — 25000132 ZZH RX MED GY IP 250 OP 250 PS 637: Performed by: PSYCHIATRY & NEUROLOGY

## 2019-08-03 PROCEDURE — 12400001 ZZH R&B MH UMMC

## 2019-08-03 PROCEDURE — 25000132 ZZH RX MED GY IP 250 OP 250 PS 637: Performed by: STUDENT IN AN ORGANIZED HEALTH CARE EDUCATION/TRAINING PROGRAM

## 2019-08-03 PROCEDURE — 00000146 ZZHCL STATISTIC GLUCOSE BY METER IP

## 2019-08-03 RX ADMIN — CLOZAPINE 450 MG: 100 TABLET ORAL at 19:46

## 2019-08-03 RX ADMIN — FENOFIBRATE 160 MG: 160 TABLET, FILM COATED ORAL at 19:47

## 2019-08-03 RX ADMIN — CLOZAPINE 200 MG: 100 TABLET ORAL at 08:26

## 2019-08-03 RX ADMIN — ESCITALOPRAM OXALATE 20 MG: 20 TABLET ORAL at 08:26

## 2019-08-03 RX ADMIN — DOCUSATE SODIUM 100 MG: 100 CAPSULE, LIQUID FILLED ORAL at 08:26

## 2019-08-03 RX ADMIN — PROPRANOLOL HYDROCHLORIDE 80 MG: 80 CAPSULE, EXTENDED RELEASE ORAL at 08:26

## 2019-08-03 RX ADMIN — THERA TABS 1 TABLET: TAB at 08:26

## 2019-08-03 RX ADMIN — METFORMIN HYDROCHLORIDE 1000 MG: 500 TABLET, FILM COATED ORAL at 08:26

## 2019-08-03 RX ADMIN — POLYETHYLENE GLYCOL 3350 17 G: 17 POWDER, FOR SOLUTION ORAL at 08:25

## 2019-08-03 RX ADMIN — DIVALPROEX SODIUM 1500 MG: 500 TABLET, FILM COATED, EXTENDED RELEASE ORAL at 19:46

## 2019-08-03 RX ADMIN — METFORMIN HYDROCHLORIDE 1000 MG: 500 TABLET, FILM COATED ORAL at 17:48

## 2019-08-03 RX ADMIN — MELATONIN 2000 UNITS: at 08:26

## 2019-08-03 RX ADMIN — ARIPIPRAZOLE 10 MG: 10 TABLET ORAL at 08:26

## 2019-08-03 RX ADMIN — Medication 1 G: at 19:47

## 2019-08-03 RX ADMIN — DIVALPROEX SODIUM 1500 MG: 500 TABLET, FILM COATED, EXTENDED RELEASE ORAL at 08:26

## 2019-08-03 RX ADMIN — NIACIN 500 MG: 500 TABLET, FILM COATED, EXTENDED RELEASE ORAL at 19:47

## 2019-08-03 RX ADMIN — PANTOPRAZOLE SODIUM 20 MG: 20 TABLET, DELAYED RELEASE ORAL at 08:26

## 2019-08-03 RX ADMIN — SIMVASTATIN 40 MG: 20 TABLET, FILM COATED ORAL at 19:47

## 2019-08-03 RX ADMIN — Medication 1 G: at 08:26

## 2019-08-03 RX ADMIN — DOCUSATE SODIUM 100 MG: 100 CAPSULE, LIQUID FILLED ORAL at 19:46

## 2019-08-03 ASSESSMENT — ACTIVITIES OF DAILY LIVING (ADL)
DRESS: INDEPENDENT
HYGIENE/GROOMING: INDEPENDENT
LAUNDRY: UNABLE TO COMPLETE
ORAL_HYGIENE: INDEPENDENT

## 2019-08-03 NOTE — PROGRESS NOTES
Pt had a pleasant evening. No behavioral issue observed or reported. Pt was isolative and withdrawn. Blood glucose this evening was 173. Pt remains medication compliant and denied SE s. Poor hygiene maintenance. Pt continues on SIO due to hypersexual and aggressive behaviors towards staff.

## 2019-08-03 NOTE — PROGRESS NOTES
Brayden was compliant with his space restriction and did not lash out at staff today. He rested in his room and watched television most of the shift.       08/02/19 2111   Behavioral Health   Hallucinations appears responding   Thinking distractable   Orientation person: oriented;place: oriented   Memory baseline memory   Insight poor   Judgement impaired   Eye Contact at examiner   Affect blunted, flat   Mood mood is calm   Physical Appearance/Attire attire appropriate to age and situation   Hygiene neglected grooming - unclean body, hair, teeth   1. Wish to be Dead No   2. Non-Specific Active Suicidal Thoughts  No   Activity withdrawn   Speech clear   Coping/Psychosocial   Verbalized Emotional State acceptance   Activities of Daily Living   Hygiene/Grooming independent   Oral Hygiene independent   Dress scrubs (behavioral health)   Laundry with supervision   Room Organization independent

## 2019-08-03 NOTE — PROGRESS NOTES
08/03/19 1400   Behavioral Health   Hallucinations denies / not responding to hallucinations   Thinking poor concentration   Orientation person: oriented;place: oriented   Memory baseline memory   Insight poor   Judgement impaired   Eye Contact into space   Affect blunted, flat   Mood mood is calm     Pt had a good shift,had all his meals and came out to take a shower then went back to his room.mood was calm and irregular behaviour Observed.

## 2019-08-04 LAB — GLUCOSE BLDC GLUCOMTR-MCNC: 119 MG/DL (ref 70–99)

## 2019-08-04 PROCEDURE — 25000132 ZZH RX MED GY IP 250 OP 250 PS 637: Performed by: STUDENT IN AN ORGANIZED HEALTH CARE EDUCATION/TRAINING PROGRAM

## 2019-08-04 PROCEDURE — 12400001 ZZH R&B MH UMMC

## 2019-08-04 PROCEDURE — 25000132 ZZH RX MED GY IP 250 OP 250 PS 637: Performed by: PSYCHIATRY & NEUROLOGY

## 2019-08-04 PROCEDURE — 00000146 ZZHCL STATISTIC GLUCOSE BY METER IP

## 2019-08-04 RX ADMIN — DIVALPROEX SODIUM 1500 MG: 500 TABLET, FILM COATED, EXTENDED RELEASE ORAL at 20:28

## 2019-08-04 RX ADMIN — MELATONIN 2000 UNITS: at 08:49

## 2019-08-04 RX ADMIN — ARIPIPRAZOLE 10 MG: 10 TABLET ORAL at 08:48

## 2019-08-04 RX ADMIN — DIVALPROEX SODIUM 1500 MG: 500 TABLET, FILM COATED, EXTENDED RELEASE ORAL at 08:48

## 2019-08-04 RX ADMIN — DOCUSATE SODIUM 100 MG: 100 CAPSULE, LIQUID FILLED ORAL at 20:28

## 2019-08-04 RX ADMIN — METFORMIN HYDROCHLORIDE 1000 MG: 500 TABLET, FILM COATED ORAL at 08:48

## 2019-08-04 RX ADMIN — SIMVASTATIN 40 MG: 20 TABLET, FILM COATED ORAL at 20:28

## 2019-08-04 RX ADMIN — ESCITALOPRAM OXALATE 20 MG: 20 TABLET ORAL at 08:48

## 2019-08-04 RX ADMIN — Medication 1 G: at 08:48

## 2019-08-04 RX ADMIN — PROPRANOLOL HYDROCHLORIDE 80 MG: 80 CAPSULE, EXTENDED RELEASE ORAL at 08:48

## 2019-08-04 RX ADMIN — POLYETHYLENE GLYCOL 3350 17 G: 17 POWDER, FOR SOLUTION ORAL at 08:48

## 2019-08-04 RX ADMIN — CLOZAPINE 200 MG: 100 TABLET ORAL at 08:49

## 2019-08-04 RX ADMIN — DOCUSATE SODIUM 100 MG: 100 CAPSULE, LIQUID FILLED ORAL at 08:48

## 2019-08-04 RX ADMIN — METFORMIN HYDROCHLORIDE 1000 MG: 500 TABLET, FILM COATED ORAL at 18:03

## 2019-08-04 RX ADMIN — THERA TABS 1 TABLET: TAB at 08:48

## 2019-08-04 RX ADMIN — PANTOPRAZOLE SODIUM 20 MG: 20 TABLET, DELAYED RELEASE ORAL at 08:49

## 2019-08-04 RX ADMIN — CLOZAPINE 450 MG: 100 TABLET ORAL at 20:28

## 2019-08-04 RX ADMIN — Medication 1 G: at 20:28

## 2019-08-04 RX ADMIN — FENOFIBRATE 160 MG: 160 TABLET, FILM COATED ORAL at 20:28

## 2019-08-04 RX ADMIN — NIACIN 500 MG: 500 TABLET, FILM COATED, EXTENDED RELEASE ORAL at 20:28

## 2019-08-04 ASSESSMENT — ACTIVITIES OF DAILY LIVING (ADL)
ORAL_HYGIENE: INDEPENDENT
HYGIENE/GROOMING: PROMPTS
DRESS: INDEPENDENT
DRESS: PROMPTS
LAUNDRY: WITH SUPERVISION
LAUNDRY: WITH SUPERVISION
ORAL_HYGIENE: PROMPTS
HYGIENE/GROOMING: INDEPENDENT

## 2019-08-04 NOTE — PLAN OF CARE
Pt was calm and pleasant this evening. He spent most of this evening in bed resting and watching TV. No behavior problem observed or reported. Pt appears to be at his baseline. Pt Insight into his mental status remains poor. Pt took a shower during the day shift. Good apatite. Pt takes his medication without any problem. No SI/SIB.

## 2019-08-04 NOTE — PROGRESS NOTES
"Pt isolative to room for most of shift, pt allowed to walk in halls while pod was clear. Pt says his mood is \"half and half,\" pt asked to elaborate but pt began talking about watching tv and sleeping. Pt denies SI/SIB. Pt endorses internal stimuli. Pt cooperative with staff. No behavioral concerns. Nothing further to report at this time.     08/04/19 1300   Behavioral Health   Hallucinations auditory;visual   Thinking poor concentration   Memory baseline memory   Insight admits / accepts   Judgement impaired   Eye Contact at examiner   Affect blunted, flat   Mood mood is calm   Physical Appearance/Attire attire appropriate to age and situation   Hygiene neglected grooming - unclean body, hair, teeth   Suicidality other (see comments)  (denies)   1. Wish to be Dead No   2. Non-Specific Active Suicidal Thoughts  No   Self Injury   (denies)   Elopement   (none)   Activity isolative;other (see comment)  (Out in [place] briefly)   Speech clear;coherent;other (see comments)  (short)   Medication Sensitivity no stated side effects;no observed side effects   Psychomotor / Gait balanced;steady   Psycho Education   Type of Intervention 1:1 intervention   Response participates, initiates socially appropriate   Hours 0.5   Treatment Detail check in   Activities of Daily Living   Hygiene/Grooming independent   Oral Hygiene independent   Dress independent   Laundry with supervision   Room Organization independent   Activity   Activity Assistance Provided independent     "

## 2019-08-05 LAB — GLUCOSE BLDC GLUCOMTR-MCNC: 137 MG/DL (ref 70–99)

## 2019-08-05 PROCEDURE — 25000132 ZZH RX MED GY IP 250 OP 250 PS 637: Performed by: PSYCHIATRY & NEUROLOGY

## 2019-08-05 PROCEDURE — 12400001 ZZH R&B MH UMMC

## 2019-08-05 PROCEDURE — 25000132 ZZH RX MED GY IP 250 OP 250 PS 637: Performed by: STUDENT IN AN ORGANIZED HEALTH CARE EDUCATION/TRAINING PROGRAM

## 2019-08-05 PROCEDURE — 99232 SBSQ HOSP IP/OBS MODERATE 35: CPT | Performed by: PSYCHIATRY & NEUROLOGY

## 2019-08-05 PROCEDURE — 00000146 ZZHCL STATISTIC GLUCOSE BY METER IP

## 2019-08-05 RX ADMIN — DOCUSATE SODIUM 100 MG: 100 CAPSULE, LIQUID FILLED ORAL at 07:50

## 2019-08-05 RX ADMIN — NIACIN 500 MG: 500 TABLET, FILM COATED, EXTENDED RELEASE ORAL at 19:46

## 2019-08-05 RX ADMIN — ESCITALOPRAM OXALATE 20 MG: 20 TABLET ORAL at 07:50

## 2019-08-05 RX ADMIN — DIVALPROEX SODIUM 1500 MG: 500 TABLET, FILM COATED, EXTENDED RELEASE ORAL at 19:45

## 2019-08-05 RX ADMIN — SIMVASTATIN 40 MG: 20 TABLET, FILM COATED ORAL at 19:46

## 2019-08-05 RX ADMIN — MELATONIN 2000 UNITS: at 07:50

## 2019-08-05 RX ADMIN — Medication 1 G: at 07:50

## 2019-08-05 RX ADMIN — ARIPIPRAZOLE 10 MG: 10 TABLET ORAL at 07:50

## 2019-08-05 RX ADMIN — DOCUSATE SODIUM 100 MG: 100 CAPSULE, LIQUID FILLED ORAL at 19:45

## 2019-08-05 RX ADMIN — Medication 1 G: at 19:46

## 2019-08-05 RX ADMIN — METFORMIN HYDROCHLORIDE 1000 MG: 500 TABLET, FILM COATED ORAL at 17:43

## 2019-08-05 RX ADMIN — PANTOPRAZOLE SODIUM 20 MG: 20 TABLET, DELAYED RELEASE ORAL at 07:50

## 2019-08-05 RX ADMIN — THERA TABS 1 TABLET: TAB at 07:50

## 2019-08-05 RX ADMIN — FENOFIBRATE 160 MG: 160 TABLET, FILM COATED ORAL at 19:46

## 2019-08-05 RX ADMIN — DIVALPROEX SODIUM 1500 MG: 500 TABLET, FILM COATED, EXTENDED RELEASE ORAL at 07:50

## 2019-08-05 RX ADMIN — METFORMIN HYDROCHLORIDE 1000 MG: 500 TABLET, FILM COATED ORAL at 07:50

## 2019-08-05 RX ADMIN — CLOZAPINE 450 MG: 100 TABLET ORAL at 19:44

## 2019-08-05 RX ADMIN — POLYETHYLENE GLYCOL 3350 17 G: 17 POWDER, FOR SOLUTION ORAL at 07:50

## 2019-08-05 RX ADMIN — CLOZAPINE 200 MG: 100 TABLET ORAL at 07:50

## 2019-08-05 RX ADMIN — PROPRANOLOL HYDROCHLORIDE 80 MG: 80 CAPSULE, EXTENDED RELEASE ORAL at 07:50

## 2019-08-05 ASSESSMENT — ACTIVITIES OF DAILY LIVING (ADL)
HYGIENE/GROOMING: PROMPTS
DRESS: SCRUBS (BEHAVIORAL HEALTH)
ORAL_HYGIENE: PROMPTS

## 2019-08-05 NOTE — PROGRESS NOTES
I called the [facility] , who did the intake for the [facility] re-screen.   I left another message telling her no one has contacted me about the [facility] re-screen.   So I again, requested the name and phone number of the [facility] screener who she assigned to the case.   [facility]  - Dulce Maria [employer] at [phone].

## 2019-08-05 NOTE — PLAN OF CARE
Pt continues to do well. Pt is about the same throughout the weekend and today. Pt is compliant with safety restrictions, directions from staff and medications. Pt still appears somewhat disorganized. Pt has no aggressive or problematic behaviors this shift. Pt is calm and cooperative. Pt reports the no physical health concerns or side effects from medications.

## 2019-08-05 NOTE — PROGRESS NOTES
[facility], [place]   Psychiatric Progress Note  Brayden Adrian  [id number]  08/05/19    Chief Complaint: Continued medical care          Interim History:   The patient's care was discussed with the treatment team during the daily team meeting and/or staff's chart notes were reviewed.  Staff report patient has been watching his room and keeping to himself he has not grabbed anybody or been aggressive.  He slept about 5 hours overnight.    Upon meeting with him he says that he is feeling good he is thinking about pizza and getting .  He is interested in some of mushroom pizza that is his favorite.  Denies any interest in harming anyone or grabbing anyone.  He does admit to possibly having some rape thoughts that come and go but is not currently thinking about that.  His appetite is adequate denies any thoughts of harming himself or others and is not hopeless or helpless planning on getting out of the hospital.  He says that his sleep is currently good and he is not scared or worried anything denies any current anxiety.  He does not have any guilty feelings or grandiose thoughts and mentions punching himself in the face however this is not accurate.  Only thing he does mention being worried about his possibly going to hell.         Medications:       ARIPiprazole  10 mg Oral Daily     cloZAPine  200 mg Oral QAM     cloZAPine  450 mg Oral At Bedtime     DIADERM FOOT REJUVENATING   Apply externally Daily     divalproex sodium extended-release  1,500 mg Oral BID     docusate sodium  100 mg Oral BID     escitalopram  20 mg Oral Daily     fenofibrate  160 mg Oral QPM     fish oil-omega-3 fatty acids  1 g Oral BID     medroxyPROGESTERone  300 mg Intramuscular Q7 Days     metFORMIN  1,000 mg Oral BID w/meals     multivitamin, therapeutic  1 tablet Oral Daily     niacin ER  500 mg Oral At Bedtime     pantoprazole  20 mg Oral Daily     polyethylene glycol  17 g Oral Daily     propranolol ER  " 80 mg Oral Daily     simvastatin  40 mg Oral At Bedtime     vitamin D3  2,000 Units Oral Daily          Allergies:     Allergies   Allergen Reactions     Haldol [Haloperidol] Other (See Comments)     Increases pt's hallucinations           Labs:     Recent Results (from the past 24 hour(s))   Glucose by meter    Collection Time: 08/05/19  7:52 AM   Result Value Ref Range    Glucose 137 (H) 70 - 99 mg/dL          Psychiatric Examination:     /77 (BP Location: Right arm)   Pulse 98   Temp 98.6  F (37  C) (Oral)   Resp 16   Ht 1.753 m (5' 9\")   Wt 101.6 kg (224 lb)   SpO2 98%   BMI 33.08 kg/m    Weight is 224 lbs 0 oz  Body mass index is 33.08 kg/m .  Lying Orthostatic BP: 121/77      Lying Orthostatic Pulse: 98 bpm      Sitting Orthostatic BP: 119/77      Sitting Orthostatic Pulse: 124 bpm      Standing Orthostatic BP: 126/82      Standing Orthostatic Pulse: 99 bpm       Weight over time:  Vitals:    06/26/19 1527 06/26/19 2102   Weight: 94.8 kg (209 lb) 101.6 kg (224 lb)       Orthostatic Vitals       Most Recent      Lying Orthostatic /77 08/05 0730    Lying Orthostatic Pulse (bpm) 98 08/05 0730            Cardiometabolic risk assessment. 08/05/19      Reviewed patient profile for cardiometabolic risk factors    Date taken /Value  REFERENCE RANGE   Abdominal Obesity  (Waist Circumference)   See nursing flowsheet Women ?35 in (88 cm)   Men ?40 in (102 cm)      Triglycerides  No results found for: TRIG    ?150 mg/dL (1.7 mmol/L) or current treatment for elevated triglycerides   HDL cholesterol  No results found for: HDL]   Women <50 mg/dL (1.3 mmol/L) in women or current treatment for low HDL cholesterol  Men <40 mg/dL (1 mmol/L) in men or current treatment for low HDL cholesterol     Fasting plasma glucose (FPG) Lab Results   Component Value Date     06/26/2019      FPG ?100 mg/dL (5.6 mmol/L) or treatment for elevated blood glucose   Blood pressure  BP Readings from Last 3 Encounters: "   08/05/19 121/77   04/02/19 110/73    Blood pressure ?130/85 mmHg or treatment for elevated blood pressure   Family History  See family history         Brayden is a 54-year-old male with white hair and eyeglasses and is overweight.  His speech is simplistic and of an appropriate rate and tone.  His behavior is appropriate and he does not appear to have any abnormal movements.  His affect is neutral.  His mood he describes as good.  His thought content consists of the above without thoughts of harming himself or others possible delusions at times.  His thought process is concrete with sometimes experiencing looseness of association.  He may have abnormal perceptions at times.  He is alert but not aware of current circumstances completely.  His attention and concentration is limited to impaired.  His long-term/short-term/remote memory is limited to impaired.  His insight and judgment are both impaired.            Precautions:     Behavioral Orders   Procedures     Assault precautions     Code 1 - Restrict to Unit     Routine Programming     As clinically indicated     Sexual precautions     Status 15     Every 15 minutes.     Status Individual Observation     Male only.  10 feet space restriction from peers and staff. Staff to stay 10 ft when patient in hallway. OK to sit at door when patient in room.     Order Specific Question:   CONTINUOUS 24 hours / day     Answer:   Other     Order Specific Question:   Specify distance     Answer:   Male only.  10 feet space restriction from peers and staff. Staff to stay 10 ft when patient in hallway. OK to sit at door when patient in room.     Order Specific Question:   Indications for SIO     Answer:   Assault risk     Order Specific Question:   Indications for SIO     Answer:   Severe intrusiveness          DIagnoses:     Schizoaffective disorder bipolar type  Mild intellectual disability  History of Tardive Dyskinesia  History of TBI  Polysubstance use disorder, in  remission            Assessment & Plan:     Brayden has continued not to show any aggressive behaviors and has been keeping to himself in his room.  He still has some baseline disorganization however he seems to be ready to discharge from the hospital.  He has been tolerating medication without current issue and we await [facility] funding so that he can discharge.  Still need to attempt to contact his outpatient psychiatrist and will likely be to do so tomorrow.    Continue voluntary by guardian    The risks, benefits, alternatives and side effects have been discussed and are understood by the patient and other caregivers.      Cayetano Mosher  [facility]      The following document has been created with voice recognition software and may contain unintentional word substitutions.    Non clinically relevant CMS requirements:  Clinical Global Impressions  First:  Considering your total clinical experience with this particular patient population, how severe are the patient's symptoms at this time?: 6 (06/28/19 2011)  Compared to the patient's condition at the START of treatment, this patient's condition is:: 4 (06/28/19 2011)  Most recent:  Considering your total clinical experience with this particular patient population, how severe are the patient's symptoms at this time?: 1 (08/02/19 1435)  Compared to the patient's condition at the START of treatment, this patient's condition is:: 1 (08/02/19 1435)

## 2019-08-05 NOTE — PROGRESS NOTES
Brayden remained compliant with his space restriction and did not attempt to grab anyone today. He was cooperative with staff directions. Brayden appears to be responding to internal stimuli. He did not socialize, though he was able to answer questions and helped clean his room with staff.       08/04/19 2207   Behavioral Health   Hallucinations appears responding   Thinking delusional;poor concentration   Orientation person: oriented;place: oriented;date: oriented;time: oriented   Memory baseline memory   Insight poor   Judgement impaired   Eye Contact at examiner   Affect full range affect   Mood mood is calm   Physical Appearance/Attire disheveled   Hygiene neglected grooming - unclean body, hair, teeth   1. Wish to be Dead No   2. Non-Specific Active Suicidal Thoughts  No   Activity withdrawn   Speech clear   Activities of Daily Living   Hygiene/Grooming prompts   Oral Hygiene prompts   Dress prompts   Laundry with supervision   Room Organization prompts

## 2019-08-05 NOTE — PROGRESS NOTES
No change in his presentation. He spent most of this evening in his room resting. No agitation or hypersexual behaviors this weekend. He remains medication compliant and denies SE s. Pt continues on SIO due to hypersexual and aggressive behaviors towards staff.

## 2019-08-06 LAB — GLUCOSE BLDC GLUCOMTR-MCNC: 132 MG/DL (ref 70–99)

## 2019-08-06 PROCEDURE — 00000146 ZZHCL STATISTIC GLUCOSE BY METER IP

## 2019-08-06 PROCEDURE — 25000132 ZZH RX MED GY IP 250 OP 250 PS 637: Performed by: STUDENT IN AN ORGANIZED HEALTH CARE EDUCATION/TRAINING PROGRAM

## 2019-08-06 PROCEDURE — 25000132 ZZH RX MED GY IP 250 OP 250 PS 637: Performed by: PSYCHIATRY & NEUROLOGY

## 2019-08-06 PROCEDURE — 12400001 ZZH R&B MH UMMC

## 2019-08-06 PROCEDURE — 99232 SBSQ HOSP IP/OBS MODERATE 35: CPT | Performed by: PSYCHIATRY & NEUROLOGY

## 2019-08-06 RX ADMIN — METFORMIN HYDROCHLORIDE 1000 MG: 500 TABLET, FILM COATED ORAL at 08:41

## 2019-08-06 RX ADMIN — DIVALPROEX SODIUM 1500 MG: 500 TABLET, FILM COATED, EXTENDED RELEASE ORAL at 19:56

## 2019-08-06 RX ADMIN — CLOZAPINE 200 MG: 100 TABLET ORAL at 08:41

## 2019-08-06 RX ADMIN — PANTOPRAZOLE SODIUM 20 MG: 20 TABLET, DELAYED RELEASE ORAL at 08:41

## 2019-08-06 RX ADMIN — PROPRANOLOL HYDROCHLORIDE 80 MG: 80 CAPSULE, EXTENDED RELEASE ORAL at 08:41

## 2019-08-06 RX ADMIN — DOCUSATE SODIUM 100 MG: 100 CAPSULE, LIQUID FILLED ORAL at 19:57

## 2019-08-06 RX ADMIN — CLOZAPINE 450 MG: 100 TABLET ORAL at 19:55

## 2019-08-06 RX ADMIN — ESCITALOPRAM OXALATE 20 MG: 20 TABLET ORAL at 08:41

## 2019-08-06 RX ADMIN — Medication 1 G: at 19:57

## 2019-08-06 RX ADMIN — FENOFIBRATE 160 MG: 160 TABLET, FILM COATED ORAL at 19:56

## 2019-08-06 RX ADMIN — ARIPIPRAZOLE 10 MG: 10 TABLET ORAL at 08:41

## 2019-08-06 RX ADMIN — DOCUSATE SODIUM 100 MG: 100 CAPSULE, LIQUID FILLED ORAL at 08:41

## 2019-08-06 RX ADMIN — METFORMIN HYDROCHLORIDE 1000 MG: 500 TABLET, FILM COATED ORAL at 18:56

## 2019-08-06 RX ADMIN — Medication 1 G: at 08:41

## 2019-08-06 RX ADMIN — POLYETHYLENE GLYCOL 3350 17 G: 17 POWDER, FOR SOLUTION ORAL at 08:40

## 2019-08-06 RX ADMIN — SIMVASTATIN 40 MG: 20 TABLET, FILM COATED ORAL at 19:56

## 2019-08-06 RX ADMIN — NIACIN 500 MG: 500 TABLET, FILM COATED, EXTENDED RELEASE ORAL at 19:56

## 2019-08-06 RX ADMIN — DIVALPROEX SODIUM 1500 MG: 500 TABLET, FILM COATED, EXTENDED RELEASE ORAL at 08:41

## 2019-08-06 RX ADMIN — MELATONIN 2000 UNITS: at 08:41

## 2019-08-06 RX ADMIN — THERA TABS 1 TABLET: TAB at 08:41

## 2019-08-06 ASSESSMENT — ACTIVITIES OF DAILY LIVING (ADL)
DRESS: SCRUBS (BEHAVIORAL HEALTH)
ORAL_HYGIENE: INDEPENDENT
DRESS: SCRUBS (BEHAVIORAL HEALTH);INDEPENDENT
ORAL_HYGIENE: INDEPENDENT
HYGIENE/GROOMING: INDEPENDENT
LAUNDRY: UNABLE TO COMPLETE
HYGIENE/GROOMING: INDEPENDENT

## 2019-08-06 NOTE — PROGRESS NOTES
[facility], [place]   Psychiatric Progress Note  Brayden Adrian  [id number]  08/06/19    Chief Complaint: Continued medical care          Interim History:   The patient's care was discussed with the treatment team during the daily team meeting and/or staff's chart notes were reviewed.  Staff report patient has been eating and drinking no recent behavioral issues and slept about 7 hours overnight.    Upon visiting with him says that he is feeling good he does not have any current problems or issues or side effects from medications.  He is not thinking about harming other people or grabbing them or sexually assaulting anyone.  Denies any thoughts of harming himself or others and is future oriented thinking about going out to dinner.  He is therefore not hopeless or helpless and is thinking about going out for steak and lobster.  He mentions something about talking to [place] and the devil and says something about feeding it to mushroom soup and steak he later says that this is make-believe still has some disorganization as described.  Has looseness of association but no current paranoia about staff members.  No current sadness guilty feelings or grandiose thoughts or anxiety.  He is interested in discharging back to his group home and going out for food.         Medications:       ARIPiprazole  10 mg Oral Daily     cloZAPine  200 mg Oral QAM     cloZAPine  450 mg Oral At Bedtime     DIADERM FOOT REJUVENATING   Apply externally Daily     divalproex sodium extended-release  1,500 mg Oral BID     docusate sodium  100 mg Oral BID     escitalopram  20 mg Oral Daily     fenofibrate  160 mg Oral QPM     fish oil-omega-3 fatty acids  1 g Oral BID     medroxyPROGESTERone  300 mg Intramuscular Q7 Days     metFORMIN  1,000 mg Oral BID w/meals     multivitamin, therapeutic  1 tablet Oral Daily     niacin ER  500 mg Oral At Bedtime     pantoprazole  20 mg Oral Daily     polyethylene glycol  17 g Oral  "Daily     propranolol ER  80 mg Oral Daily     simvastatin  40 mg Oral At Bedtime     vitamin D3  2,000 Units Oral Daily          Allergies:     Allergies   Allergen Reactions     Haldol [Haloperidol] Other (See Comments)     Increases pt's hallucinations           Labs:     Recent Results (from the past 24 hour(s))   Glucose by meter    Collection Time: 08/06/19  7:26 AM   Result Value Ref Range    Glucose 132 (H) 70 - 99 mg/dL          Psychiatric Examination:     /82 (BP Location: Right arm)   Pulse 106   Temp 98.1  F (36.7  C) (Oral)   Resp 16   Ht 1.753 m (5' 9\")   Wt 101.6 kg (224 lb)   SpO2 98%   BMI 33.08 kg/m    Weight is 224 lbs 0 oz  Body mass index is 33.08 kg/m .  Lying Orthostatic BP: 102/73      Lying Orthostatic Pulse: 91 bpm      Sitting Orthostatic BP: 117/82      Sitting Orthostatic Pulse: 106 bpm      Standing Orthostatic BP: 126/82      Standing Orthostatic Pulse: 99 bpm       Weight over time:  Vitals:    06/26/19 1527 06/26/19 2102   Weight: 94.8 kg (209 lb) 101.6 kg (224 lb)       Orthostatic Vitals       Most Recent      Lying Orthostatic /73 08/06 0730    Lying Orthostatic Pulse (bpm) 91 08/06 0730    Sitting Orthostatic /82 08/06 0730    Sitting Orthostatic Pulse (bpm) 106 08/06 0730              Cardiometabolic risk assessment. 08/06/19      Reviewed patient profile for cardiometabolic risk factors    Date taken /Value  REFERENCE RANGE   Abdominal Obesity  (Waist Circumference)   See nursing flowsheet Women ?35 in (88 cm)   Men ?40 in (102 cm)      Triglycerides  No results found for: TRIG    ?150 mg/dL (1.7 mmol/L) or current treatment for elevated triglycerides   HDL cholesterol  No results found for: HDL]   Women <50 mg/dL (1.3 mmol/L) in women or current treatment for low HDL cholesterol  Men <40 mg/dL (1 mmol/L) in men or current treatment for low HDL cholesterol     Fasting plasma glucose (FPG) Lab Results   Component Value Date     06/26/2019      " FPG ?100 mg/dL (5.6 mmol/L) or treatment for elevated blood glucose   Blood pressure  BP Readings from Last 3 Encounters:   08/06/19 117/82   04/02/19 110/73    Blood pressure ?130/85 mmHg or treatment for elevated blood pressure   Family History  See family history         Brayden is a 54-year-old male with white hair and eyeglasses and is overweight.  His speech is simplistic and of an appropriate rate and tone.  His behavior is appropriate and he does not appear to have any abnormal movements.  His affect is neutral.  His mood he describes as good.  His thought content consists of the above without thoughts of harming himself or others possible delusions at times.  His thought process is concrete with sometimes experiencing looseness of association.  He may have abnormal perceptions at times.  He is alert but not aware of current circumstances completely.  His attention and concentration is limited to impaired.  His long-term/short-term/remote memory is limited to impaired.  His insight and judgment are both impaired.            Precautions:     Behavioral Orders   Procedures     Assault precautions     Code 1 - Restrict to Unit     Routine Programming     As clinically indicated     Sexual precautions     Status 15     Every 15 minutes.     Status Individual Observation     Male only.  10 feet space restriction from peers and staff. Staff to stay 10 ft when patient in hallway. OK to sit at door when patient in room.     Order Specific Question:   CONTINUOUS 24 hours / day     Answer:   Other     Order Specific Question:   Specify distance     Answer:   Male only.  10 feet space restriction from peers and staff. Staff to stay 10 ft when patient in hallway. OK to sit at door when patient in room.     Order Specific Question:   Indications for SIO     Answer:   Assault risk     Order Specific Question:   Indications for SIO     Answer:   Severe intrusiveness          DIagnoses:     Schizoaffective disorder bipolar  type  Mild intellectual disability  History of Tardive Dyskinesia  History of TBI  Polysubstance use disorder, in remission            Assessment & Plan:     Brayden still not had any recent aggressive behaviors or violence and seems to be at his baseline of usual function.  He does mention odd comments and strange things that he experiences unclear if they are actual psychotic content.  He is more organized than he previously was without medications during another hospitalization.  Is likely at his most functional status and we await discharge.  I did again contact his outpatient psychiatrist and left a voicemail.    Continue voluntary by guardian    The risks, benefits, alternatives and side effects have been discussed and are understood by the patient and other caregivers.      Cayetano Mosher  Health system Psychiatry      The following document has been created with voice recognition software and may contain unintentional word substitutions.    Non clinically relevant CMS requirements:  Clinical Global Impressions  First:  Considering your total clinical experience with this particular patient population, how severe are the patient's symptoms at this time?: 6 (06/28/19 2011)  Compared to the patient's condition at the START of treatment, this patient's condition is:: 4 (06/28/19 2011)  Most recent:  Considering your total clinical experience with this particular patient population, how severe are the patient's symptoms at this time?: 1 (08/02/19 1435)  Compared to the patient's condition at the START of treatment, this patient's condition is:: 1 (08/02/19 1435)

## 2019-08-06 NOTE — PROGRESS NOTES
08/05/19 2220   Behavioral Health   Hallucinations denies / not responding to hallucinations   Thinking poor concentration   Orientation person: oriented;place: oriented;date: oriented;time: oriented   Memory baseline memory   Insight poor   Judgement impaired   Eye Contact at examiner     Patient spent all of his time in his room. The patient denied having any SI or having any thoughts of hurting himself or others. The patient did have an appetite and ate dinner tonight.

## 2019-08-06 NOTE — PROGRESS NOTES
"Pt used the exercise bike sparingly. He ate all his meals. Difficult to carry a conversation or report accurate details of his life. No aggressions. Endorses talking to angels and devils. Said he fed his soul \"fried chicken and mushroom soup.\"   "

## 2019-08-07 LAB
BASOPHILS # BLD AUTO: 0.1 10E9/L (ref 0–0.2)
BASOPHILS NFR BLD AUTO: 1 %
DIFFERENTIAL METHOD BLD: NORMAL
EOSINOPHIL # BLD AUTO: 0.1 10E9/L (ref 0–0.7)
EOSINOPHIL NFR BLD AUTO: 1.3 %
GLUCOSE BLDC GLUCOMTR-MCNC: 131 MG/DL (ref 70–99)
IMM GRANULOCYTES # BLD: 0.1 10E9/L (ref 0–0.4)
IMM GRANULOCYTES NFR BLD: 1.4 %
LYMPHOCYTES # BLD AUTO: 3.7 10E9/L (ref 0.8–5.3)
LYMPHOCYTES NFR BLD AUTO: 37 %
MONOCYTES # BLD AUTO: 1.3 10E9/L (ref 0–1.3)
MONOCYTES NFR BLD AUTO: 13.2 %
NEUTROPHILS # BLD AUTO: 4.6 10E9/L (ref 1.6–8.3)
NEUTROPHILS NFR BLD AUTO: 46.1 %
NRBC # BLD AUTO: 0 10*3/UL
NRBC BLD AUTO-RTO: 0 /100
WBC # BLD AUTO: 10 10E9/L (ref 4–11)

## 2019-08-07 PROCEDURE — 25000132 ZZH RX MED GY IP 250 OP 250 PS 637: Performed by: STUDENT IN AN ORGANIZED HEALTH CARE EDUCATION/TRAINING PROGRAM

## 2019-08-07 PROCEDURE — 85004 AUTOMATED DIFF WBC COUNT: CPT | Performed by: PSYCHIATRY & NEUROLOGY

## 2019-08-07 PROCEDURE — 85048 AUTOMATED LEUKOCYTE COUNT: CPT | Performed by: PSYCHIATRY & NEUROLOGY

## 2019-08-07 PROCEDURE — 25000132 ZZH RX MED GY IP 250 OP 250 PS 637: Performed by: PSYCHIATRY & NEUROLOGY

## 2019-08-07 PROCEDURE — 00000146 ZZHCL STATISTIC GLUCOSE BY METER IP

## 2019-08-07 PROCEDURE — 36415 COLL VENOUS BLD VENIPUNCTURE: CPT | Performed by: PSYCHIATRY & NEUROLOGY

## 2019-08-07 PROCEDURE — 99232 SBSQ HOSP IP/OBS MODERATE 35: CPT | Performed by: PSYCHIATRY & NEUROLOGY

## 2019-08-07 PROCEDURE — 12400001 ZZH R&B MH UMMC

## 2019-08-07 RX ADMIN — POLYETHYLENE GLYCOL 3350 17 G: 17 POWDER, FOR SOLUTION ORAL at 08:47

## 2019-08-07 RX ADMIN — ESCITALOPRAM OXALATE 20 MG: 20 TABLET ORAL at 08:47

## 2019-08-07 RX ADMIN — FENOFIBRATE 160 MG: 160 TABLET, FILM COATED ORAL at 19:29

## 2019-08-07 RX ADMIN — DIVALPROEX SODIUM 1500 MG: 500 TABLET, FILM COATED, EXTENDED RELEASE ORAL at 19:29

## 2019-08-07 RX ADMIN — METFORMIN HYDROCHLORIDE 1000 MG: 500 TABLET, FILM COATED ORAL at 17:27

## 2019-08-07 RX ADMIN — DOCUSATE SODIUM 100 MG: 100 CAPSULE, LIQUID FILLED ORAL at 08:48

## 2019-08-07 RX ADMIN — MELATONIN 2000 UNITS: at 08:48

## 2019-08-07 RX ADMIN — DIVALPROEX SODIUM 1500 MG: 500 TABLET, FILM COATED, EXTENDED RELEASE ORAL at 08:47

## 2019-08-07 RX ADMIN — CLOZAPINE 450 MG: 100 TABLET ORAL at 19:29

## 2019-08-07 RX ADMIN — DOCUSATE SODIUM 100 MG: 100 CAPSULE, LIQUID FILLED ORAL at 19:29

## 2019-08-07 RX ADMIN — SIMVASTATIN 40 MG: 20 TABLET, FILM COATED ORAL at 19:29

## 2019-08-07 RX ADMIN — THERA TABS 1 TABLET: TAB at 08:47

## 2019-08-07 RX ADMIN — NIACIN 500 MG: 500 TABLET, FILM COATED, EXTENDED RELEASE ORAL at 19:29

## 2019-08-07 RX ADMIN — ARIPIPRAZOLE 10 MG: 10 TABLET ORAL at 08:48

## 2019-08-07 RX ADMIN — METFORMIN HYDROCHLORIDE 1000 MG: 500 TABLET, FILM COATED ORAL at 08:47

## 2019-08-07 RX ADMIN — CLOZAPINE 200 MG: 100 TABLET ORAL at 08:47

## 2019-08-07 RX ADMIN — PROPRANOLOL HYDROCHLORIDE 80 MG: 80 CAPSULE, EXTENDED RELEASE ORAL at 08:47

## 2019-08-07 RX ADMIN — Medication 1 G: at 19:29

## 2019-08-07 RX ADMIN — PANTOPRAZOLE SODIUM 20 MG: 20 TABLET, DELAYED RELEASE ORAL at 08:48

## 2019-08-07 RX ADMIN — Medication 1 G: at 08:47

## 2019-08-07 ASSESSMENT — ACTIVITIES OF DAILY LIVING (ADL)
LAUNDRY: UNABLE TO COMPLETE
HYGIENE/GROOMING: PROMPTS
DRESS: SCRUBS (BEHAVIORAL HEALTH)
ORAL_HYGIENE: PROMPTS

## 2019-08-07 NOTE — PLAN OF CARE
Patient continues on SIO, male only staffing, and a 10 foot space restriction from others to manage his impulsive and unpredictable aggressive behaviors.  Brayden presents as guarded, paranoid, and socially withdrawn with a flat affect.  He did not make any overtly delusional statements in our discussions this evening.  He was pleasant and cooperative on approach.  He mentioned that he is looking forward to eating at [employer] and [employer] when he returns to his group home.  He did not exhibit any signs of agitation or aggressive behavior this evening.  He did not engage in any intrusive or hypersexual behaviors.  He was mostly isolative to his room, watching television.  He did not comply with prompts to engage in personal hygiene maintenance and exercise.  He was compliant with all of his scheduled medications, and no adverse side effects were reported or observed.  He denies any acute physical concerns.

## 2019-08-08 LAB — GLUCOSE BLDC GLUCOMTR-MCNC: 118 MG/DL (ref 70–99)

## 2019-08-08 PROCEDURE — 25000132 ZZH RX MED GY IP 250 OP 250 PS 637: Performed by: STUDENT IN AN ORGANIZED HEALTH CARE EDUCATION/TRAINING PROGRAM

## 2019-08-08 PROCEDURE — 25000132 ZZH RX MED GY IP 250 OP 250 PS 637: Performed by: PSYCHIATRY & NEUROLOGY

## 2019-08-08 PROCEDURE — 12400001 ZZH R&B MH UMMC

## 2019-08-08 PROCEDURE — 00000146 ZZHCL STATISTIC GLUCOSE BY METER IP

## 2019-08-08 RX ADMIN — CLOZAPINE 450 MG: 100 TABLET ORAL at 19:00

## 2019-08-08 RX ADMIN — MELATONIN 2000 UNITS: at 08:11

## 2019-08-08 RX ADMIN — FENOFIBRATE 160 MG: 160 TABLET, FILM COATED ORAL at 19:01

## 2019-08-08 RX ADMIN — Medication 1 G: at 08:11

## 2019-08-08 RX ADMIN — DOCUSATE SODIUM 100 MG: 100 CAPSULE, LIQUID FILLED ORAL at 08:10

## 2019-08-08 RX ADMIN — Medication 1 G: at 19:00

## 2019-08-08 RX ADMIN — POLYETHYLENE GLYCOL 3350 17 G: 17 POWDER, FOR SOLUTION ORAL at 08:11

## 2019-08-08 RX ADMIN — NIACIN 500 MG: 500 TABLET, FILM COATED, EXTENDED RELEASE ORAL at 19:01

## 2019-08-08 RX ADMIN — PANTOPRAZOLE SODIUM 20 MG: 20 TABLET, DELAYED RELEASE ORAL at 08:11

## 2019-08-08 RX ADMIN — METFORMIN HYDROCHLORIDE 1000 MG: 500 TABLET, FILM COATED ORAL at 17:40

## 2019-08-08 RX ADMIN — METFORMIN HYDROCHLORIDE 1000 MG: 500 TABLET, FILM COATED ORAL at 08:11

## 2019-08-08 RX ADMIN — CLOZAPINE 200 MG: 100 TABLET ORAL at 08:11

## 2019-08-08 RX ADMIN — ARIPIPRAZOLE 10 MG: 10 TABLET ORAL at 08:10

## 2019-08-08 RX ADMIN — DIVALPROEX SODIUM 1500 MG: 500 TABLET, FILM COATED, EXTENDED RELEASE ORAL at 19:01

## 2019-08-08 RX ADMIN — DOCUSATE SODIUM 100 MG: 100 CAPSULE, LIQUID FILLED ORAL at 19:00

## 2019-08-08 RX ADMIN — THERA TABS 1 TABLET: TAB at 08:11

## 2019-08-08 RX ADMIN — SIMVASTATIN 40 MG: 20 TABLET, FILM COATED ORAL at 19:00

## 2019-08-08 RX ADMIN — ESCITALOPRAM OXALATE 20 MG: 20 TABLET ORAL at 08:11

## 2019-08-08 RX ADMIN — DIVALPROEX SODIUM 1500 MG: 500 TABLET, FILM COATED, EXTENDED RELEASE ORAL at 08:10

## 2019-08-08 RX ADMIN — PROPRANOLOL HYDROCHLORIDE 80 MG: 80 CAPSULE, EXTENDED RELEASE ORAL at 08:11

## 2019-08-08 ASSESSMENT — ACTIVITIES OF DAILY LIVING (ADL)
LAUNDRY: UNABLE TO COMPLETE
ORAL_HYGIENE: PROMPTS
ORAL_HYGIENE: PROMPTS
HYGIENE/GROOMING: INDEPENDENT;PROMPTS
DRESS: SCRUBS (BEHAVIORAL HEALTH);PROMPTS
DRESS: SCRUBS (BEHAVIORAL HEALTH)
HYGIENE/GROOMING: INDEPENDENT;PROMPTS

## 2019-08-08 NOTE — PLAN OF CARE
Problem: Behavior Regulation Impairment (Disruptive Behavior)  Goal: Improved Impulse and Aggression Control (Disruptive Behavior)  Outcome: No Change    Patient continues on SIO 1:1 male only with a 10 ft. space restriction from peers, due to assaultive behaviors.  Patient was isolative and withdrawn to his room.  Patient utilized the exercise bike in his room for about 25 minutes total (in 3 to 5 minute periods, after prompts from staff.)  Patient stated he felt  bad , because  I am behind on my studies  in  biology .  Patient did not want to further discuss his studies.  He denied AH/VH, but reported that he was talking to  Satan in the corner of my room  and  We were thinking about stuff .  Patient had a flat affect. He reported fleeting thoughts of SI, but contracted for safety.    Patient was compliant with medications as scheduled.  He denied acute medical concerns and medication side effects.      Patient took a shower on day shift.

## 2019-08-08 NOTE — PROGRESS NOTES
"   08/08/19 0905   Behavioral Health   Hallucinations denies / not responding to hallucinations   Thinking confused   Orientation person: oriented;place: oriented;date: oriented   Memory baseline memory   Insight poor   Judgement impaired   Eye Contact staring;cultural specific   Affect full range affect   Mood mood is calm   Physical Appearance/Attire attire appropriate to age and situation   Hygiene well groomed   1. Wish to be Dead No   2. Non-Specific Active Suicidal Thoughts  No   Activity isolative;withdrawn   Speech clear;coherent   Medication Sensitivity no stated side effects   Psychomotor / Gait slow;balanced;steady   Overt Aggression Scale   Verbal Aggression 0   Aggression against Property 0   Auto-Aggression 0   Physical Aggression 0   Overt Aggression Total Score 0   Psycho Education   Type of Intervention 1:1 intervention   Response participates, initiates socially appropriate   Hours 0.5   Treatment Detail IMR   Activities of Daily Living   Hygiene/Grooming independent;prompts   Oral Hygiene prompts   Dress scrubs (behavioral health)   Room Organization prompts   Pt ate meals and was social with staff. Pt states he is not hearing voices today and states \"I'm not going to try to rape anyone today.\" Pt states he has no thought or desire to harm self or others at this time. Pt completed room clean up and hygiene with prompts for staff.   "

## 2019-08-08 NOTE — PROGRESS NOTES
[facility], [place]   Psychiatric Progress Note  Brayden Adrian  [id number]  08/07/19    Chief Complaint: Continued medical care          Interim History:   The patient's care was discussed with the treatment team during the daily team meeting and/or staff's chart notes were reviewed.  Staff report patient has been having some elevated blood pressure interested in going out to eat when he gets out of the hospital and is hopeful about going to his group home.  He slept about 4 hours overnight and did complain that something happened with his study of biology.    Upon meeting with him he says that he is feeling good he does not have any current problems or side effects from his medication is looking forward to going back to his group home.  He is to going out to dinner and is not hopeless or helpless or having any thoughts of harming himself or others.  He does mention that God is bothering him somehow and then says that that is his imagination and not necessarily true.  He is not having any thoughts of grabbing anybody any sleep issues appetite problems or anxiety.  Does not appear to be hallucinating or distracted though he may have some strange delusional beliefs and storytelling at times.  Urged him to exercise he apparently use the exercise bike somewhat yesterday.  Described importance of exercising and making sure his muscles remain strong.    Again attempted to make contact with his outpatient psychiatrist.             Medications:       ARIPiprazole  10 mg Oral Daily     cloZAPine  200 mg Oral QAM     cloZAPine  450 mg Oral At Bedtime     DIADERM FOOT REJUVENATING   Apply externally Daily     divalproex sodium extended-release  1,500 mg Oral BID     docusate sodium  100 mg Oral BID     escitalopram  20 mg Oral Daily     fenofibrate  160 mg Oral QPM     fish oil-omega-3 fatty acids  1 g Oral BID     medroxyPROGESTERone  300 mg Intramuscular Q7 Days     metFORMIN  1,000 mg Oral BID  "w/meals     multivitamin, therapeutic  1 tablet Oral Daily     niacin ER  500 mg Oral At Bedtime     pantoprazole  20 mg Oral Daily     polyethylene glycol  17 g Oral Daily     propranolol ER  80 mg Oral Daily     simvastatin  40 mg Oral At Bedtime     vitamin D3  2,000 Units Oral Daily          Allergies:     Allergies   Allergen Reactions     Haldol [Haloperidol] Other (See Comments)     Increases pt's hallucinations           Labs:     Recent Results (from the past 24 hour(s))   WBC and differential    Collection Time: 08/07/19  7:49 AM   Result Value Ref Range    WBC 10.0 4.0 - 11.0 10e9/L    Diff Method Automated Method     % Neutrophils 46.1 %    % Lymphocytes 37.0 %    % Monocytes 13.2 %    % Eosinophils 1.3 %    % Basophils 1.0 %    % Immature Granulocytes 1.4 %    Nucleated RBCs 0 0 /100    Absolute Neutrophil 4.6 1.6 - 8.3 10e9/L    Absolute Lymphocytes 3.7 0.8 - 5.3 10e9/L    Absolute Monocytes 1.3 0.0 - 1.3 10e9/L    Absolute Eosinophils 0.1 0.0 - 0.7 10e9/L    Absolute Basophils 0.1 0.0 - 0.2 10e9/L    Abs Immature Granulocytes 0.1 0 - 0.4 10e9/L    Absolute Nucleated RBC 0.0    Glucose by meter    Collection Time: 08/07/19 11:45 AM   Result Value Ref Range    Glucose 131 (H) 70 - 99 mg/dL          Psychiatric Examination:     BP (!) 158/83 (BP Location: Left arm)   Pulse 90   Temp 98.8  F (37.1  C) (Tympanic)   Resp 16   Ht 1.753 m (5' 9\")   Wt 101.6 kg (224 lb)   SpO2 98%   BMI 33.08 kg/m    Weight is 224 lbs 0 oz  Body mass index is 33.08 kg/m .  Lying Orthostatic BP: 158/83      Lying Orthostatic Pulse: 90 bpm      Sitting Orthostatic BP: 117/82      Sitting Orthostatic Pulse: 106 bpm      Standing Orthostatic BP: 126/82      Standing Orthostatic Pulse: 99 bpm       Weight over time:  Vitals:    06/26/19 1527 06/26/19 2102   Weight: 94.8 kg (209 lb) 101.6 kg (224 lb)       Orthostatic Vitals       Most Recent      Lying Orthostatic /83 08/07 0810    Lying Orthostatic Pulse (bpm) 90 " 08/07 0810            Cardiometabolic risk assessment. 08/07/19      Reviewed patient profile for cardiometabolic risk factors    Date taken /Value  REFERENCE RANGE   Abdominal Obesity  (Waist Circumference)   See nursing flowsheet Women ?35 in (88 cm)   Men ?40 in (102 cm)      Triglycerides  No results found for: TRIG    ?150 mg/dL (1.7 mmol/L) or current treatment for elevated triglycerides   HDL cholesterol  No results found for: HDL]   Women <50 mg/dL (1.3 mmol/L) in women or current treatment for low HDL cholesterol  Men <40 mg/dL (1 mmol/L) in men or current treatment for low HDL cholesterol     Fasting plasma glucose (FPG) Lab Results   Component Value Date     06/26/2019      FPG ?100 mg/dL (5.6 mmol/L) or treatment for elevated blood glucose   Blood pressure  BP Readings from Last 3 Encounters:   08/07/19 (!) 158/83   04/02/19 110/73    Blood pressure ?130/85 mmHg or treatment for elevated blood pressure   Family History  See family history         Brayden is a 54-year-old male with white hair and eyeglasses and is overweight.  His speech is simplistic and of an appropriate rate and tone.  His behavior is appropriate and he does not appear to have any abnormal movements.  His affect is neutral.  His mood he describes as good.  His thought content consists of the above without thoughts of harming himself or others possible delusions at times.  His thought process is concrete with sometimes experiencing looseness of association.  He may have abnormal perceptions at times.  He is alert but not aware of current circumstances completely.  His attention and concentration is limited to impaired.  His long-term/short-term/remote memory is limited to impaired.  His insight and judgment are both impaired.            Precautions:     Behavioral Orders   Procedures     Assault precautions     Code 1 - Restrict to Unit     Routine Programming     As clinically indicated     Sexual precautions     Status 15      Every 15 minutes.     Status Individual Observation     Male only.  10 feet space restriction from peers and staff. Staff to stay 10 ft when patient in hallway. OK to sit at door when patient in room.     Order Specific Question:   CONTINUOUS 24 hours / day     Answer:   Other     Order Specific Question:   Specify distance     Answer:   Male only.  10 feet space restriction from peers and staff. Staff to stay 10 ft when patient in hallway. OK to sit at door when patient in room.     Order Specific Question:   Indications for SIO     Answer:   Assault risk     Order Specific Question:   Indications for SIO     Answer:   Severe intrusiveness          DIagnoses:     Schizoaffective disorder bipolar type  Mild intellectual disability  History of Tardive Dyskinesia  History of TBI  Polysubstance use disorder, in remission            Assessment & Plan:     Brayden continues to do well on the unit but is primarily isolating himself to his room based on anxiety and not wanting to interact with others.  He is relatively sedentary and needs prompting to exercise and take care of himself.  He has not been violent or aggressive recently and has been highly redirectable which is his baseline.  Still has some disorganized content and delusional thoughts will likely continue.  No changes needed at this point in time.    Continue voluntary by guardian    The risks, benefits, alternatives and side effects have been discussed and are understood by the patient and other caregivers.      Cayetano Mosher  [facility]      The following document has been created with voice recognition software and may contain unintentional word substitutions.    Non clinically relevant CMS requirements:  Clinical Global Impressions  First:  Considering your total clinical experience with this particular patient population, how severe are the patient's symptoms at this time?: 6 (06/28/19 2011)  Compared to the patient's  condition at the START of treatment, this patient's condition is:: 4 (06/28/19 2011)  Most recent:  Considering your total clinical experience with this particular patient population, how severe are the patient's symptoms at this time?: 1 (08/02/19 1435)  Compared to the patient's condition at the START of treatment, this patient's condition is:: 1 (08/02/19 1435)

## 2019-08-09 LAB — GLUCOSE BLDC GLUCOMTR-MCNC: 121 MG/DL (ref 70–99)

## 2019-08-09 PROCEDURE — 00000146 ZZHCL STATISTIC GLUCOSE BY METER IP

## 2019-08-09 PROCEDURE — 12400001 ZZH R&B MH UMMC

## 2019-08-09 PROCEDURE — 25000132 ZZH RX MED GY IP 250 OP 250 PS 637: Performed by: STUDENT IN AN ORGANIZED HEALTH CARE EDUCATION/TRAINING PROGRAM

## 2019-08-09 PROCEDURE — 25000128 H RX IP 250 OP 636: Performed by: STUDENT IN AN ORGANIZED HEALTH CARE EDUCATION/TRAINING PROGRAM

## 2019-08-09 PROCEDURE — 25000132 ZZH RX MED GY IP 250 OP 250 PS 637: Performed by: PSYCHIATRY & NEUROLOGY

## 2019-08-09 RX ADMIN — PROPRANOLOL HYDROCHLORIDE 80 MG: 80 CAPSULE, EXTENDED RELEASE ORAL at 08:08

## 2019-08-09 RX ADMIN — CLOZAPINE 450 MG: 100 TABLET ORAL at 19:42

## 2019-08-09 RX ADMIN — Medication 1 G: at 19:42

## 2019-08-09 RX ADMIN — NIACIN 500 MG: 500 TABLET, FILM COATED, EXTENDED RELEASE ORAL at 19:43

## 2019-08-09 RX ADMIN — MEDROXYPROGESTERONE ACETATE 300 MG: 150 INJECTION, SUSPENSION INTRAMUSCULAR at 10:48

## 2019-08-09 RX ADMIN — DOCUSATE SODIUM 100 MG: 100 CAPSULE, LIQUID FILLED ORAL at 08:08

## 2019-08-09 RX ADMIN — DIVALPROEX SODIUM 1500 MG: 500 TABLET, FILM COATED, EXTENDED RELEASE ORAL at 08:08

## 2019-08-09 RX ADMIN — METFORMIN HYDROCHLORIDE 1000 MG: 500 TABLET, FILM COATED ORAL at 08:08

## 2019-08-09 RX ADMIN — SIMVASTATIN 40 MG: 20 TABLET, FILM COATED ORAL at 19:43

## 2019-08-09 RX ADMIN — PANTOPRAZOLE SODIUM 20 MG: 20 TABLET, DELAYED RELEASE ORAL at 08:08

## 2019-08-09 RX ADMIN — METFORMIN HYDROCHLORIDE 1000 MG: 500 TABLET, FILM COATED ORAL at 17:26

## 2019-08-09 RX ADMIN — MELATONIN 2000 UNITS: at 08:08

## 2019-08-09 RX ADMIN — DIVALPROEX SODIUM 1500 MG: 500 TABLET, FILM COATED, EXTENDED RELEASE ORAL at 19:42

## 2019-08-09 RX ADMIN — CLOZAPINE 200 MG: 100 TABLET ORAL at 08:08

## 2019-08-09 RX ADMIN — ARIPIPRAZOLE 10 MG: 10 TABLET ORAL at 08:08

## 2019-08-09 RX ADMIN — POLYETHYLENE GLYCOL 3350 17 G: 17 POWDER, FOR SOLUTION ORAL at 08:08

## 2019-08-09 RX ADMIN — THERA TABS 1 TABLET: TAB at 08:08

## 2019-08-09 RX ADMIN — DOCUSATE SODIUM 100 MG: 100 CAPSULE, LIQUID FILLED ORAL at 19:43

## 2019-08-09 RX ADMIN — FENOFIBRATE 160 MG: 160 TABLET, FILM COATED ORAL at 19:42

## 2019-08-09 RX ADMIN — ESCITALOPRAM OXALATE 20 MG: 20 TABLET ORAL at 08:08

## 2019-08-09 RX ADMIN — Medication 1 G: at 08:08

## 2019-08-09 RX ADMIN — ACETAMINOPHEN 650 MG: 325 TABLET, FILM COATED ORAL at 04:02

## 2019-08-09 ASSESSMENT — ACTIVITIES OF DAILY LIVING (ADL)
HYGIENE/GROOMING: INDEPENDENT;PROMPTS
ORAL_HYGIENE: PROMPTS
DRESS: SCRUBS (BEHAVIORAL HEALTH)
HYGIENE/GROOMING: PROMPTS
ORAL_HYGIENE: PROMPTS
DRESS: SCRUBS (BEHAVIORAL HEALTH)

## 2019-08-09 NOTE — PROGRESS NOTES
While patient was receiving his schedule Depo-Provera injection, he attempted to lunge at RN writer.  2 staff present for injection, and were able to redirect patient immediately without incident.  Patient immediately apologized and remained seated on his bed while staff left his room.

## 2019-08-09 NOTE — PLAN OF CARE
BEHAVIORAL TEAM DISCUSSION    Participants: (Dr Bonilla out ill; no coverage doctor), Riya Linares RN, Gloria Roman [facility]  Progress: Pt had gone numerous days with no aggression and now today he aggressively lunged toward the RN who approached to administer the Depo Provera injection.   He was easily re-directed by staff.   He went and sat down on his bed and was apologetic.   He still makes strange statements -  eg: that he was talking with Satan in the corner of his room about things and that he will try not to rape anyone today.     He mostly isolates to room.   Med compliant.  Continued Stay Criteria/Rationale: due to above   Medical/Physical: no new issues  Precautions:   Behavioral Orders   Procedures    Assault precautions    Code 1 - Restrict to Unit    Routine Programming     As clinically indicated    Sexual precautions    Status 15     Every 15 minutes.    Status Individual Observation     Male only.  10 feet space restriction from peers and staff. Staff to stay 10 ft when patient in hallway. OK to sit at door when patient in room.     Order Specific Question:   CONTINUOUS 24 hours / day     Answer:   Other     Order Specific Question:   Specify distance     Answer:   Male only.  10 feet space restriction from peers and staff. Staff to stay 10 ft when patient in hallway. OK to sit at door when patient in room.     Order Specific Question:   Indications for SIO     Answer:   Assault risk     Order Specific Question:   Indications for SIO     Answer:   Severe intrusiveness     Plan: Meds per dr bonilla.    called and spoke with Aretha from pt's group home.     Update given.    I told her I would request Dr Bonilla to call and speak with pt's primary care doctor about administering the Depo Provera.      The CADI screening meeting is scheduled Tues Aug 13th at 1pm in conference room, Station 12.  Pt's father/guardian will be present, as well as the [employer] Screener, and group home staff.  Screener:   Vy Holder [phone].  Rationale for change in precautions or plan:   no change at this time.

## 2019-08-09 NOTE — PROGRESS NOTES
"Pt isolated in room entire shift. Pt did some exercise on the stationary bike in his room. Pt affect was blunted and flat, and mood was calm. Pt denies active AH, but said he was hearing \"things earlier\". Pt did not elaborate. Pt denies SI/SIB. There were no other behavioral concerns of note during this shift.     08/08/19 2108   Behavioral Health   Hallucinations auditory   Thinking delusional;poor concentration   Orientation person: oriented;place: oriented;time: oriented   Memory baseline memory   Insight poor   Judgement impaired   Eye Contact at examiner;staring;into space   Affect blunted, flat   Mood mood is calm   Physical Appearance/Attire untidy;disheveled   Hygiene other (see comment)  (adequate)   Suicidality other (see comments)  (denies)   1. Wish to be Dead No   2. Non-Specific Active Suicidal Thoughts  No   Self Injury other (see comment)  (none observed)   Elopement   (none observed)   Activity isolative;withdrawn   Speech clear;other (see comments)  (minimal verbal interaction; one or two word answers)   Medication Sensitivity no stated side effects;no observed side effects   Psychomotor / Gait balanced;steady   Psycho Education   Type of Intervention 1:1 intervention   Response participates with cues/redirection   Hours 0.5   Treatment Detail   (check-in)   Activities of Daily Living   Hygiene/Grooming independent;prompts   Oral Hygiene prompts   Dress scrubs (behavioral health);prompts   Laundry unable to complete   Room Organization prompts     "

## 2019-08-09 NOTE — PROGRESS NOTES
"Patient up at 0400, reporting body pain.  Stating \"I have a little bit of pain all over my body.\"  PRN tylenol administered.  No aggressive behavior observed from patient.  Patient was cooperative with this writers directions.  Will continue to monitor.   "

## 2019-08-09 NOTE — PROGRESS NOTES
called and spoke with Aretha from pt's group home.     Update given.    I told her I would request Dr Mosher to call and speak with pt's primary care doctor about administering the Depo Provera.      The CADI screening meeting is scheduled Tues Aug 13th at 1pm in conference room, Station 12.  Pt's father/guardian will be present, as well as the [employer] Screener, and group home staff.  Screener:  Vy Holder [phone].

## 2019-08-09 NOTE — PLAN OF CARE
Pt began day well, allowing staff to get vital signs, and blood glucose checks without incident.  While giving patient his schedule injection of Depo-Provera patient lunged at RN writer, needed to be redirected by staff.  Patient immediately apologized and said he wasn't sure why he did it.  Patient had no other incidents of aggression.  He needs prompting to complete his ADLs, and ate his meals well.  He denied pain today.  Medication compliant.

## 2019-08-10 LAB — GLUCOSE BLDC GLUCOMTR-MCNC: 140 MG/DL (ref 70–99)

## 2019-08-10 PROCEDURE — 00000146 ZZHCL STATISTIC GLUCOSE BY METER IP

## 2019-08-10 PROCEDURE — 25000132 ZZH RX MED GY IP 250 OP 250 PS 637: Performed by: STUDENT IN AN ORGANIZED HEALTH CARE EDUCATION/TRAINING PROGRAM

## 2019-08-10 PROCEDURE — 25000132 ZZH RX MED GY IP 250 OP 250 PS 637: Performed by: PSYCHIATRY & NEUROLOGY

## 2019-08-10 PROCEDURE — 12400001 ZZH R&B MH UMMC

## 2019-08-10 RX ADMIN — DIVALPROEX SODIUM 1500 MG: 500 TABLET, FILM COATED, EXTENDED RELEASE ORAL at 19:39

## 2019-08-10 RX ADMIN — NIACIN 500 MG: 500 TABLET, FILM COATED, EXTENDED RELEASE ORAL at 19:40

## 2019-08-10 RX ADMIN — THERA TABS 1 TABLET: TAB at 09:07

## 2019-08-10 RX ADMIN — METFORMIN HYDROCHLORIDE 1000 MG: 500 TABLET, FILM COATED ORAL at 19:39

## 2019-08-10 RX ADMIN — DIVALPROEX SODIUM 1500 MG: 500 TABLET, FILM COATED, EXTENDED RELEASE ORAL at 09:07

## 2019-08-10 RX ADMIN — PANTOPRAZOLE SODIUM 20 MG: 20 TABLET, DELAYED RELEASE ORAL at 09:06

## 2019-08-10 RX ADMIN — DOCUSATE SODIUM 100 MG: 100 CAPSULE, LIQUID FILLED ORAL at 09:07

## 2019-08-10 RX ADMIN — METFORMIN HYDROCHLORIDE 1000 MG: 500 TABLET, FILM COATED ORAL at 09:07

## 2019-08-10 RX ADMIN — ARIPIPRAZOLE 10 MG: 10 TABLET ORAL at 09:06

## 2019-08-10 RX ADMIN — DOCUSATE SODIUM 100 MG: 100 CAPSULE, LIQUID FILLED ORAL at 19:40

## 2019-08-10 RX ADMIN — ESCITALOPRAM OXALATE 20 MG: 20 TABLET ORAL at 09:06

## 2019-08-10 RX ADMIN — POLYETHYLENE GLYCOL 3350 17 G: 17 POWDER, FOR SOLUTION ORAL at 09:07

## 2019-08-10 RX ADMIN — Medication 1 G: at 09:06

## 2019-08-10 RX ADMIN — SIMVASTATIN 40 MG: 20 TABLET, FILM COATED ORAL at 19:39

## 2019-08-10 RX ADMIN — MELATONIN 2000 UNITS: at 09:07

## 2019-08-10 RX ADMIN — Medication 1 G: at 19:39

## 2019-08-10 RX ADMIN — CLOZAPINE 200 MG: 100 TABLET ORAL at 09:06

## 2019-08-10 RX ADMIN — FENOFIBRATE 160 MG: 160 TABLET, FILM COATED ORAL at 19:39

## 2019-08-10 RX ADMIN — PROPRANOLOL HYDROCHLORIDE 80 MG: 80 CAPSULE, EXTENDED RELEASE ORAL at 09:06

## 2019-08-10 RX ADMIN — CLOZAPINE 450 MG: 100 TABLET ORAL at 19:40

## 2019-08-10 ASSESSMENT — ACTIVITIES OF DAILY LIVING (ADL)
DRESS: SCRUBS (BEHAVIORAL HEALTH)
HYGIENE/GROOMING: INDEPENDENT
DRESS: SCRUBS (BEHAVIORAL HEALTH)
ORAL_HYGIENE: INDEPENDENT
HYGIENE/GROOMING: PROMPTS
ORAL_HYGIENE: PROMPTS

## 2019-08-10 NOTE — PROGRESS NOTES
08/10/19 1500   Behavioral Health   Hallucinations denies / not responding to hallucinations   Thinking distractable   Memory baseline memory   Insight insight appropriate to situation   Judgement impaired   Eye Contact at examiner   Affect blunted, flat   Mood mood is calm   Physical Appearance/Attire disheveled   Hygiene neglected grooming - unclean body, hair, teeth   1. Wish to be Dead No   2. Non-Specific Active Suicidal Thoughts  No   Activities of Daily Living   Hygiene/Grooming prompts   Oral Hygiene prompts   Dress scrubs (behavioral health)   Room Organization prompts       Pt in room for entire shift. Pt calm and his requests were appropriate.

## 2019-08-10 NOTE — PROGRESS NOTES
No instances of aggression. Compliant with vitals and meds. Ate meals. Pt presents disheveled. Blunted affect. Staring freq into space.       08/09/19 2200   Behavioral Health   Hallucinations appears responding   Thinking delusional;poor concentration   Orientation person: oriented;place: oriented   Memory baseline memory   Insight poor   Judgement impaired   Eye Contact staring;into space;at examiner   Affect blunted, flat;tense   Mood mood is calm   Physical Appearance/Attire disheveled   Hygiene neglected grooming - unclean body, hair, teeth   1. Wish to be Dead No   2. Non-Specific Active Suicidal Thoughts  No   Activities of Daily Living   Hygiene/Grooming prompts   Oral Hygiene prompts   Dress scrubs (behavioral health)   Room Organization prompts

## 2019-08-11 LAB — GLUCOSE BLDC GLUCOMTR-MCNC: 125 MG/DL (ref 70–99)

## 2019-08-11 PROCEDURE — 25000132 ZZH RX MED GY IP 250 OP 250 PS 637: Performed by: STUDENT IN AN ORGANIZED HEALTH CARE EDUCATION/TRAINING PROGRAM

## 2019-08-11 PROCEDURE — 25000132 ZZH RX MED GY IP 250 OP 250 PS 637: Performed by: PSYCHIATRY & NEUROLOGY

## 2019-08-11 PROCEDURE — 12400001 ZZH R&B MH UMMC

## 2019-08-11 PROCEDURE — 00000146 ZZHCL STATISTIC GLUCOSE BY METER IP

## 2019-08-11 RX ADMIN — PANTOPRAZOLE SODIUM 20 MG: 20 TABLET, DELAYED RELEASE ORAL at 09:00

## 2019-08-11 RX ADMIN — DOCUSATE SODIUM 100 MG: 100 CAPSULE, LIQUID FILLED ORAL at 19:33

## 2019-08-11 RX ADMIN — SIMVASTATIN 40 MG: 20 TABLET, FILM COATED ORAL at 19:33

## 2019-08-11 RX ADMIN — METFORMIN HYDROCHLORIDE 1000 MG: 500 TABLET, FILM COATED ORAL at 08:59

## 2019-08-11 RX ADMIN — DOCUSATE SODIUM 100 MG: 100 CAPSULE, LIQUID FILLED ORAL at 09:00

## 2019-08-11 RX ADMIN — CLOZAPINE 200 MG: 100 TABLET ORAL at 08:59

## 2019-08-11 RX ADMIN — DIVALPROEX SODIUM 1500 MG: 500 TABLET, FILM COATED, EXTENDED RELEASE ORAL at 19:33

## 2019-08-11 RX ADMIN — POLYETHYLENE GLYCOL 3350 17 G: 17 POWDER, FOR SOLUTION ORAL at 08:59

## 2019-08-11 RX ADMIN — CLOZAPINE 450 MG: 100 TABLET ORAL at 19:33

## 2019-08-11 RX ADMIN — ARIPIPRAZOLE 10 MG: 10 TABLET ORAL at 09:00

## 2019-08-11 RX ADMIN — PROPRANOLOL HYDROCHLORIDE 80 MG: 80 CAPSULE, EXTENDED RELEASE ORAL at 09:00

## 2019-08-11 RX ADMIN — Medication 1 G: at 19:33

## 2019-08-11 RX ADMIN — ESCITALOPRAM OXALATE 20 MG: 20 TABLET ORAL at 09:00

## 2019-08-11 RX ADMIN — FENOFIBRATE 160 MG: 160 TABLET, FILM COATED ORAL at 19:33

## 2019-08-11 RX ADMIN — THERA TABS 1 TABLET: TAB at 09:00

## 2019-08-11 RX ADMIN — Medication 1 G: at 08:59

## 2019-08-11 RX ADMIN — MELATONIN 2000 UNITS: at 08:59

## 2019-08-11 RX ADMIN — METFORMIN HYDROCHLORIDE 1000 MG: 500 TABLET, FILM COATED ORAL at 17:52

## 2019-08-11 RX ADMIN — NIACIN 500 MG: 500 TABLET, FILM COATED, EXTENDED RELEASE ORAL at 19:33

## 2019-08-11 RX ADMIN — DIVALPROEX SODIUM 1500 MG: 500 TABLET, FILM COATED, EXTENDED RELEASE ORAL at 08:59

## 2019-08-11 ASSESSMENT — ACTIVITIES OF DAILY LIVING (ADL)
DRESS: SCRUBS (BEHAVIORAL HEALTH)
HYGIENE/GROOMING: INDEPENDENT
ORAL_HYGIENE: INDEPENDENT
HYGIENE/GROOMING: INDEPENDENT
ORAL_HYGIENE: INDEPENDENT
DRESS: SCRUBS (BEHAVIORAL HEALTH);INDEPENDENT
LAUNDRY: WITH SUPERVISION

## 2019-08-11 NOTE — PLAN OF CARE
"Patient continues on SIO male only staffing pattern and a 10 foot space restriction from others.  He presents as quiet and socially withdrawn with a flat affect.  He denies that he is experiencing any psychotic symptoms.  He denies that he is experiencing any aggressive ideations.  He refuses to engage in any exercise or personal hygiene maintenance and reports that \"I'll do that later today\".  He cooperated with blood glucose check this AM (GG=255) and accepted all of his scheduled medications without incident.  He denies any adverse side effects from his current regimen or acute physical concerns.  "

## 2019-08-11 NOTE — PROGRESS NOTES
"   08/10/19 2200   Behavioral Health   Hallucinations denies / not responding to hallucinations   Thinking distractable   Orientation person: oriented;place: oriented;date: oriented   Memory baseline memory   Insight admits / accepts   Judgement impaired   Eye Contact staring;cultural specific   Affect blunted, flat   Mood mood is calm   Physical Appearance/Attire disheveled   Hygiene neglected grooming - unclean body, hair, teeth   1. Wish to be Dead No   2. Non-Specific Active Suicidal Thoughts  No   Activity withdrawn   Speech clear;coherent   Medication Sensitivity no stated side effects   Psychomotor / Gait balanced;steady   Overt Aggression Scale   Verbal Aggression 0   Aggression against Property 0   Auto-Aggression 0   Physical Aggression 0   Overt Aggression Total Score 0   Psycho Education   Type of Intervention 1:1 intervention   Response participates, initiates socially appropriate   Hours 0.5   Treatment Detail IMR   Activities of Daily Living   Hygiene/Grooming independent   Oral Hygiene independent   Dress scrubs (behavioral health)   Room Organization independent   Pt ate meals and was minimally social with staff. Pt states he has no thought or desire to harm self or others at this time. Pt states \"I have to control my sexual urges to get out of here\" when asked about discharge plan.   "

## 2019-08-12 LAB — GLUCOSE BLDC GLUCOMTR-MCNC: 149 MG/DL (ref 70–99)

## 2019-08-12 PROCEDURE — 99232 SBSQ HOSP IP/OBS MODERATE 35: CPT | Performed by: PSYCHIATRY & NEUROLOGY

## 2019-08-12 PROCEDURE — 25000132 ZZH RX MED GY IP 250 OP 250 PS 637: Performed by: PSYCHIATRY & NEUROLOGY

## 2019-08-12 PROCEDURE — 00000146 ZZHCL STATISTIC GLUCOSE BY METER IP

## 2019-08-12 PROCEDURE — 25000132 ZZH RX MED GY IP 250 OP 250 PS 637: Performed by: STUDENT IN AN ORGANIZED HEALTH CARE EDUCATION/TRAINING PROGRAM

## 2019-08-12 PROCEDURE — 12400001 ZZH R&B MH UMMC

## 2019-08-12 RX ADMIN — DOCUSATE SODIUM 100 MG: 100 CAPSULE, LIQUID FILLED ORAL at 08:35

## 2019-08-12 RX ADMIN — METFORMIN HYDROCHLORIDE 1000 MG: 500 TABLET, FILM COATED ORAL at 20:01

## 2019-08-12 RX ADMIN — METFORMIN HYDROCHLORIDE 1000 MG: 500 TABLET, FILM COATED ORAL at 08:35

## 2019-08-12 RX ADMIN — Medication 1 G: at 08:35

## 2019-08-12 RX ADMIN — CLOZAPINE 200 MG: 100 TABLET ORAL at 08:35

## 2019-08-12 RX ADMIN — ESCITALOPRAM OXALATE 20 MG: 20 TABLET ORAL at 08:35

## 2019-08-12 RX ADMIN — Medication 1 G: at 20:01

## 2019-08-12 RX ADMIN — DIVALPROEX SODIUM 1500 MG: 500 TABLET, FILM COATED, EXTENDED RELEASE ORAL at 08:35

## 2019-08-12 RX ADMIN — PANTOPRAZOLE SODIUM 20 MG: 20 TABLET, DELAYED RELEASE ORAL at 08:35

## 2019-08-12 RX ADMIN — SIMVASTATIN 40 MG: 20 TABLET, FILM COATED ORAL at 20:01

## 2019-08-12 RX ADMIN — POLYETHYLENE GLYCOL 3350 17 G: 17 POWDER, FOR SOLUTION ORAL at 08:35

## 2019-08-12 RX ADMIN — DIVALPROEX SODIUM 1500 MG: 500 TABLET, FILM COATED, EXTENDED RELEASE ORAL at 20:01

## 2019-08-12 RX ADMIN — DOCUSATE SODIUM 100 MG: 100 CAPSULE, LIQUID FILLED ORAL at 20:01

## 2019-08-12 RX ADMIN — NIACIN 500 MG: 500 TABLET, FILM COATED, EXTENDED RELEASE ORAL at 20:01

## 2019-08-12 RX ADMIN — MELATONIN 2000 UNITS: at 08:35

## 2019-08-12 RX ADMIN — ARIPIPRAZOLE 10 MG: 10 TABLET ORAL at 08:35

## 2019-08-12 RX ADMIN — FENOFIBRATE 160 MG: 160 TABLET, FILM COATED ORAL at 20:01

## 2019-08-12 RX ADMIN — CLOZAPINE 450 MG: 100 TABLET ORAL at 20:01

## 2019-08-12 RX ADMIN — PROPRANOLOL HYDROCHLORIDE 80 MG: 80 CAPSULE, EXTENDED RELEASE ORAL at 08:35

## 2019-08-12 RX ADMIN — THERA TABS 1 TABLET: TAB at 08:35

## 2019-08-12 ASSESSMENT — ACTIVITIES OF DAILY LIVING (ADL)
LAUNDRY: UNABLE TO COMPLETE
DRESS: SCRUBS (BEHAVIORAL HEALTH)
LAUNDRY: UNABLE TO COMPLETE
ORAL_HYGIENE: PROMPTS
DRESS: SCRUBS (BEHAVIORAL HEALTH)
ORAL_HYGIENE: PROMPTS
HYGIENE/GROOMING: PROMPTS
HYGIENE/GROOMING: PROMPTS

## 2019-08-12 NOTE — PROGRESS NOTES
Patient has had a quiet shift with no opportunity for patient to act out. Patient is not able to articulate why he will go after staff. Patient is under the impression that he will be discharged tomorrow.     08/12/19 1417   Sleep/Rest/Relaxation   Day/Evening Time Hours napping;resting in bed   Number of hours napping 2 hours   Number of hours resting in bed 7 hours   Behavioral Health   Hallucinations other (see comment);auditory  (Somewhat endorses)   Thinking poor concentration;delusional;distractable   Orientation person: oriented   Memory baseline memory   Insight poor   Judgement impaired   Eye Contact into space;blinking;at examiner   Affect blunted, flat   Mood anxious;mood is calm   Physical Appearance/Attire untidy;disheveled   Hygiene neglected grooming - unclean body, hair, teeth   Suicidality other (see comments)  (Denies)   1. Wish to be Dead No   2. Non-Specific Active Suicidal Thoughts  No   Self Injury other (see comment)  (No SIB observed)   Activity isolative;withdrawn;restless;other (see comment)  (Per careplan)   Speech other (see comments)  (delays before answering)   Medication Sensitivity no stated side effects;no observed side effects   Psychomotor / Gait balanced;steady   Overt Aggression Scale   Verbal Aggression 0   Aggression against Property 0   Auto-Aggression 0   Physical Aggression 0   Overt Aggression Total Score 0   Psycho Education   Type of Intervention 1:1 intervention   Response observes from a distance   Hours 0.5   Treatment Detail Check in   Daily Care   Activity up ad joyce   Activities of Daily Living   Hygiene/Grooming prompts   Oral Hygiene prompts   Dress scrubs (behavioral health)   Laundry unable to complete   Room Organization prompts   Activity   Activity Assistance Provided independent

## 2019-08-12 NOTE — PROGRESS NOTES
08/11/19 2200   Significant Event   Significant Event Other (see comments)  (no aggression this shift)     Pt was calm and followed staff direction well. He cooperated with riding the bike, eating, taking meds and finishing a shower this gadiel.He was pleasant and smiled at times during interactions with staff.

## 2019-08-12 NOTE — PROGRESS NOTES
[facility], [place]   Psychiatric Progress Note  Brayden Adrian  [id number]  08/12/19    Chief Complaint: Continued medical care          Interim History:   The patient's care was discussed with the treatment team during the daily team meeting and/or staff's chart notes were reviewed.  Staff report patient has been doing some exercising on the exercise bike and lunged at one staff but was easily redirected.    Upon meeting with him he says that he is feeling okay he is ready to go back to his group home and is hoping to go out to eat and watch movies.  He mentions some interest in the [employer] movies.  He denies any interest in grabbing anyone but does have some sexually base thoughts.  He mentions possibly being a bad person because he is not  unclear if this leads to feelings of depression he sometimes describes.  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  He again trails off during conversation describing a prize to his spirit.  Denies any thoughts of harming himself or others energy problems appetite issues sleep dysfunction.  He is not sure if he is going to take a shower today.  No current paranoia but likely has auditory hallucinations at times people whispering things in his ears.  Difficult to disc concern from storytelling.  Mentions that people are jealous of people being  and going to have him.  Still has disorganization of thoughts and does not mention any current side effects or anxiety.    A conversation with Dr. Schulz and she is willing to prescribe the 300 mg injection of the Depo-Provera outside the hospital.         Medications:       ARIPiprazole  10 mg Oral Daily     cloZAPine  200 mg Oral QAM     cloZAPine  450 mg Oral At Bedtime     DIADERM FOOT REJUVENATING   Apply externally Daily     divalproex sodium extended-release  1,500 mg Oral BID     docusate sodium  100 mg Oral BID      "escitalopram  20 mg Oral Daily     fenofibrate  160 mg Oral QPM     fish oil-omega-3 fatty acids  1 g Oral BID     medroxyPROGESTERone  300 mg Intramuscular Q7 Days     metFORMIN  1,000 mg Oral BID w/meals     multivitamin, therapeutic  1 tablet Oral Daily     niacin ER  500 mg Oral At Bedtime     pantoprazole  20 mg Oral Daily     polyethylene glycol  17 g Oral Daily     propranolol ER  80 mg Oral Daily     simvastatin  40 mg Oral At Bedtime     vitamin D3  2,000 Units Oral Daily          Allergies:     Allergies   Allergen Reactions     Haldol [Haloperidol] Other (See Comments)     Increases pt's hallucinations           Labs:     Recent Results (from the past 24 hour(s))   Glucose by meter    Collection Time: 08/12/19  7:47 AM   Result Value Ref Range    Glucose 149 (H) 70 - 99 mg/dL          Psychiatric Examination:     /78 (BP Location: Right arm)   Pulse 87   Temp 99  F (37.2  C) (Tympanic)   Resp 16   Ht 1.753 m (5' 9\")   Wt 101.6 kg (224 lb)   SpO2 97%   BMI 33.08 kg/m    Weight is 224 lbs 0 oz  Body mass index is 33.08 kg/m .  Lying Orthostatic BP: 122/65      Lying Orthostatic Pulse: 86 bpm      Sitting Orthostatic BP: 134/85      Sitting Orthostatic Pulse: 79 bpm      Standing Orthostatic BP: 121/78      Standing Orthostatic Pulse: 87 bpm       Weight over time:  Vitals:    06/26/19 1527 06/26/19 2102   Weight: 94.8 kg (209 lb) 101.6 kg (224 lb)       Orthostatic Vitals       Most Recent      Standing Orthostatic /78 08/12 0820    Standing Orthostatic Pulse (bpm) 87 08/12 0820            Cardiometabolic risk assessment. 08/12/19      Reviewed patient profile for cardiometabolic risk factors    Date taken /Value  REFERENCE RANGE   Abdominal Obesity  (Waist Circumference)   See nursing flowsheet Women ?35 in (88 cm)   Men ?40 in (102 cm)      Triglycerides  No results found for: TRIG    ?150 mg/dL (1.7 mmol/L) or current treatment for elevated triglycerides   HDL cholesterol  No results " found for: HDL]   Women <50 mg/dL (1.3 mmol/L) in women or current treatment for low HDL cholesterol  Men <40 mg/dL (1 mmol/L) in men or current treatment for low HDL cholesterol     Fasting plasma glucose (FPG) Lab Results   Component Value Date     06/26/2019      FPG ?100 mg/dL (5.6 mmol/L) or treatment for elevated blood glucose   Blood pressure  BP Readings from Last 3 Encounters:   08/12/19 121/78   04/02/19 110/73    Blood pressure ?130/85 mmHg or treatment for elevated blood pressure   Family History  See family history         Brayden is a 54-year-old male with white hair and eyeglasses and is overweight.  His speech is simplistic and of an appropriate rate and tone.  His behavior is appropriate and he does not appear to have any abnormal movements.  His affect is neutral.  His mood he describes as good.  His thought content consists of the above without thoughts of harming himself or others possible delusions at times.  His thought process is concrete with sometimes experiencing looseness of association.  He may have abnormal perceptions at times.  He is alert but not aware of current circumstances completely.  His attention and concentration is limited to impaired.  His long-term/short-term/remote memory is limited to impaired.  His insight and judgment are both impaired.            Precautions:     Behavioral Orders   Procedures     Assault precautions     Code 1 - Restrict to Unit     Routine Programming     As clinically indicated     Sexual precautions     Status 15     Every 15 minutes.     Status Individual Observation     Male only.  10 feet space restriction from peers and staff. Staff to stay 10 ft when patient in hallway. OK to sit at door when patient in room.     Order Specific Question:   CONTINUOUS 24 hours / day     Answer:   Other     Order Specific Question:   Specify distance     Answer:   Male only.  10 feet space restriction from peers and staff. Staff to stay 10 ft when patient  in hallway. OK to sit at door when patient in room.     Order Specific Question:   Indications for SIO     Answer:   Assault risk     Order Specific Question:   Indications for SIO     Answer:   Severe intrusiveness          DIagnoses:     Schizoaffective disorder bipolar type  Mild intellectual disability  History of Tardive Dyskinesia  History of TBI  Polysubstance use disorder, in remission         Assessment & Plan:     Brayden continues to do well on the unit did have one episode of lunging at someone but seems to be at his baseline of function.  He does make some interesting comments about marriage being important and possibly being more important him for him to enjoy himself.  Unclear if this leads to Advent delusional preoccupation and discomfort.  Perhaps he is aware that he is not  and is not as functional as other people and this leads to aggression and agitation and frustration.  Communicated with his outpatient family practice about current dosage of Depo-Provera and they are able to prescribe it and administer at their clinic.    Continue voluntary by guardian    The risks, benefits, alternatives and side effects have been discussed and are understood by the patient and other caregivers.      Cayetano Mosher  [facility] Psychiatry      The following document has been created with voice recognition software and may contain unintentional word substitutions.    Non clinically relevant CMS requirements:  Clinical Global Impressions  First:  Considering your total clinical experience with this particular patient population, how severe are the patient's symptoms at this time?: 6 (06/28/19 2011)  Compared to the patient's condition at the START of treatment, this patient's condition is:: 4 (06/28/19 2011)  Most recent:  Considering your total clinical experience with this particular patient population, how severe are the patient's symptoms at this time?: 2 (08/12/19 0842)  Compared  to the patient's condition at the START of treatment, this patient's condition is:: 2 (08/12/19 0842)

## 2019-08-12 NOTE — PROGRESS NOTES
"  Patient reported to PA that his stomach \"hurt.\" Writer asked him if he felt nauseated or was having stomach cramps and he reported, \"It feels more like depression.\" Writer asked patient to describe in more detail how his stomach felt and started rambling about a \"dream car,\" his \"intestines,\" and \"God.\" Writer asked patient if he has had any diarrhea recently and he reported he had, writer asked him how long he'd been having it and he reported, \"about six months.\" Writer informed patient that if he has diarrhea to please refrain from flushing the toilet and having nurse come in to view it, patient agreed. Patient rode bike for 5 minutes and showered, piece of paper was found in room that had multiple pictures of \" people,\" drawn on it by patient along with a sentence written that said, \"what is the best sex you can get?\" Writer asked patient what the  people were doing in the drawing and he reported, \"I'd rather not say.\" No incidents of aggression. Will continue to monitor for safety.   "

## 2019-08-13 LAB — GLUCOSE BLDC GLUCOMTR-MCNC: 140 MG/DL (ref 70–99)

## 2019-08-13 PROCEDURE — 25000132 ZZH RX MED GY IP 250 OP 250 PS 637: Performed by: STUDENT IN AN ORGANIZED HEALTH CARE EDUCATION/TRAINING PROGRAM

## 2019-08-13 PROCEDURE — 25000132 ZZH RX MED GY IP 250 OP 250 PS 637: Performed by: PSYCHIATRY & NEUROLOGY

## 2019-08-13 PROCEDURE — 12400001 ZZH R&B MH UMMC

## 2019-08-13 PROCEDURE — 00000146 ZZHCL STATISTIC GLUCOSE BY METER IP

## 2019-08-13 PROCEDURE — 99232 SBSQ HOSP IP/OBS MODERATE 35: CPT | Performed by: PSYCHIATRY & NEUROLOGY

## 2019-08-13 RX ADMIN — DOCUSATE SODIUM 100 MG: 100 CAPSULE, LIQUID FILLED ORAL at 20:46

## 2019-08-13 RX ADMIN — MELATONIN 2000 UNITS: at 08:25

## 2019-08-13 RX ADMIN — FENOFIBRATE 160 MG: 160 TABLET, FILM COATED ORAL at 20:46

## 2019-08-13 RX ADMIN — METFORMIN HYDROCHLORIDE 1000 MG: 500 TABLET, FILM COATED ORAL at 08:36

## 2019-08-13 RX ADMIN — NIACIN 500 MG: 500 TABLET, FILM COATED, EXTENDED RELEASE ORAL at 20:47

## 2019-08-13 RX ADMIN — PANTOPRAZOLE SODIUM 20 MG: 20 TABLET, DELAYED RELEASE ORAL at 08:25

## 2019-08-13 RX ADMIN — CLOZAPINE 200 MG: 100 TABLET ORAL at 08:25

## 2019-08-13 RX ADMIN — ARIPIPRAZOLE 10 MG: 10 TABLET ORAL at 08:26

## 2019-08-13 RX ADMIN — POLYETHYLENE GLYCOL 3350 17 G: 17 POWDER, FOR SOLUTION ORAL at 08:25

## 2019-08-13 RX ADMIN — Medication 1 G: at 20:47

## 2019-08-13 RX ADMIN — Medication 1 G: at 08:25

## 2019-08-13 RX ADMIN — HYDROXYZINE HYDROCHLORIDE 25 MG: 25 TABLET ORAL at 14:49

## 2019-08-13 RX ADMIN — DOCUSATE SODIUM 100 MG: 100 CAPSULE, LIQUID FILLED ORAL at 08:26

## 2019-08-13 RX ADMIN — DIVALPROEX SODIUM 1500 MG: 500 TABLET, FILM COATED, EXTENDED RELEASE ORAL at 20:46

## 2019-08-13 RX ADMIN — METFORMIN HYDROCHLORIDE 1000 MG: 500 TABLET, FILM COATED ORAL at 17:28

## 2019-08-13 RX ADMIN — SIMVASTATIN 40 MG: 20 TABLET, FILM COATED ORAL at 20:46

## 2019-08-13 RX ADMIN — CLOZAPINE 450 MG: 100 TABLET ORAL at 20:47

## 2019-08-13 RX ADMIN — THERA TABS 1 TABLET: TAB at 08:26

## 2019-08-13 RX ADMIN — PROPRANOLOL HYDROCHLORIDE 80 MG: 80 CAPSULE, EXTENDED RELEASE ORAL at 08:25

## 2019-08-13 RX ADMIN — DIVALPROEX SODIUM 1500 MG: 500 TABLET, FILM COATED, EXTENDED RELEASE ORAL at 08:25

## 2019-08-13 RX ADMIN — ESCITALOPRAM OXALATE 20 MG: 20 TABLET ORAL at 08:25

## 2019-08-13 ASSESSMENT — ACTIVITIES OF DAILY LIVING (ADL)
ORAL_HYGIENE: PROMPTS
HYGIENE/GROOMING: PROMPTS

## 2019-08-13 NOTE — PLAN OF CARE
"  Patient continues on SIO 1:1 for safety. Remains isolated to room, withdrawn. Blunted affect, appears calm laying in room on bed watching TV. Appears disheveled, poor hygiene. Reports feeling \"paranoid,\" and that he's been having \"visons,\" of \"monsters in the wall that want to eat me.\" PRN medication given per protocol. Denies S/I, S/I/B or urges to hurt others. Medication compliant. Patient's father and a staff member from [facility] came for CADI assessment meeting and visited with patient successfully. No incidents of aggression on this day shift. Will continue to monitor for safety.  "
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
## 2019-08-14 LAB
BASOPHILS # BLD AUTO: 0.1 10E9/L (ref 0–0.2)
BASOPHILS NFR BLD AUTO: 1 %
DIFFERENTIAL METHOD BLD: NORMAL
EOSINOPHIL # BLD AUTO: 0.1 10E9/L (ref 0–0.7)
EOSINOPHIL NFR BLD AUTO: 1.2 %
GLUCOSE BLDC GLUCOMTR-MCNC: 150 MG/DL (ref 70–99)
IMM GRANULOCYTES # BLD: 0.1 10E9/L (ref 0–0.4)
IMM GRANULOCYTES NFR BLD: 1.5 %
LYMPHOCYTES # BLD AUTO: 3.7 10E9/L (ref 0.8–5.3)
LYMPHOCYTES NFR BLD AUTO: 40.8 %
MONOCYTES # BLD AUTO: 1.1 10E9/L (ref 0–1.3)
MONOCYTES NFR BLD AUTO: 12.6 %
NEUTROPHILS # BLD AUTO: 3.8 10E9/L (ref 1.6–8.3)
NEUTROPHILS NFR BLD AUTO: 42.9 %
NRBC # BLD AUTO: 0 10*3/UL
NRBC BLD AUTO-RTO: 0 /100
WBC # BLD AUTO: 9 10E9/L (ref 4–11)

## 2019-08-14 PROCEDURE — 36415 COLL VENOUS BLD VENIPUNCTURE: CPT | Performed by: PSYCHIATRY & NEUROLOGY

## 2019-08-14 PROCEDURE — 12400001 ZZH R&B MH UMMC

## 2019-08-14 PROCEDURE — 25000132 ZZH RX MED GY IP 250 OP 250 PS 637: Performed by: STUDENT IN AN ORGANIZED HEALTH CARE EDUCATION/TRAINING PROGRAM

## 2019-08-14 PROCEDURE — 25000132 ZZH RX MED GY IP 250 OP 250 PS 637: Performed by: PSYCHIATRY & NEUROLOGY

## 2019-08-14 PROCEDURE — 85048 AUTOMATED LEUKOCYTE COUNT: CPT | Performed by: PSYCHIATRY & NEUROLOGY

## 2019-08-14 PROCEDURE — 85004 AUTOMATED DIFF WBC COUNT: CPT | Performed by: PSYCHIATRY & NEUROLOGY

## 2019-08-14 PROCEDURE — 99232 SBSQ HOSP IP/OBS MODERATE 35: CPT | Performed by: PSYCHIATRY & NEUROLOGY

## 2019-08-14 PROCEDURE — 00000146 ZZHCL STATISTIC GLUCOSE BY METER IP

## 2019-08-14 RX ADMIN — FENOFIBRATE 160 MG: 160 TABLET, FILM COATED ORAL at 19:00

## 2019-08-14 RX ADMIN — METFORMIN HYDROCHLORIDE 1000 MG: 500 TABLET, FILM COATED ORAL at 17:38

## 2019-08-14 RX ADMIN — NIACIN 500 MG: 500 TABLET, FILM COATED, EXTENDED RELEASE ORAL at 19:01

## 2019-08-14 RX ADMIN — CLOZAPINE 450 MG: 100 TABLET ORAL at 19:00

## 2019-08-14 RX ADMIN — DOCUSATE SODIUM 100 MG: 100 CAPSULE, LIQUID FILLED ORAL at 08:19

## 2019-08-14 RX ADMIN — MELATONIN 2000 UNITS: at 08:19

## 2019-08-14 RX ADMIN — PROPRANOLOL HYDROCHLORIDE 80 MG: 80 CAPSULE, EXTENDED RELEASE ORAL at 08:20

## 2019-08-14 RX ADMIN — ARIPIPRAZOLE 10 MG: 10 TABLET ORAL at 08:19

## 2019-08-14 RX ADMIN — Medication 1 G: at 08:19

## 2019-08-14 RX ADMIN — POLYETHYLENE GLYCOL 3350 17 G: 17 POWDER, FOR SOLUTION ORAL at 08:20

## 2019-08-14 RX ADMIN — THERA TABS 1 TABLET: TAB at 08:20

## 2019-08-14 RX ADMIN — DIVALPROEX SODIUM 1500 MG: 500 TABLET, FILM COATED, EXTENDED RELEASE ORAL at 19:00

## 2019-08-14 RX ADMIN — PANTOPRAZOLE SODIUM 20 MG: 20 TABLET, DELAYED RELEASE ORAL at 08:19

## 2019-08-14 RX ADMIN — DIVALPROEX SODIUM 1500 MG: 500 TABLET, FILM COATED, EXTENDED RELEASE ORAL at 08:19

## 2019-08-14 RX ADMIN — Medication 1 G: at 19:00

## 2019-08-14 RX ADMIN — METFORMIN HYDROCHLORIDE 1000 MG: 500 TABLET, FILM COATED ORAL at 08:19

## 2019-08-14 RX ADMIN — CLOZAPINE 200 MG: 100 TABLET ORAL at 08:19

## 2019-08-14 RX ADMIN — DOCUSATE SODIUM 100 MG: 100 CAPSULE, LIQUID FILLED ORAL at 19:00

## 2019-08-14 RX ADMIN — SIMVASTATIN 40 MG: 20 TABLET, FILM COATED ORAL at 19:00

## 2019-08-14 RX ADMIN — ESCITALOPRAM OXALATE 20 MG: 20 TABLET ORAL at 08:19

## 2019-08-14 ASSESSMENT — ACTIVITIES OF DAILY LIVING (ADL)
ORAL_HYGIENE: PROMPTS;INDEPENDENT
HYGIENE/GROOMING: PROMPTS;INDEPENDENT
DRESS: SCRUBS (BEHAVIORAL HEALTH)
DRESS: SCRUBS (BEHAVIORAL HEALTH)
LAUNDRY: UNABLE TO COMPLETE
HYGIENE/GROOMING: INDEPENDENT
ORAL_HYGIENE: INDEPENDENT

## 2019-08-14 NOTE — PROGRESS NOTES
08/13/19 2200   Behavioral Health   Hallucinations visual   Thinking poor concentration   Orientation person: oriented;place: oriented;date: oriented;time: oriented   Memory baseline memory   Insight poor   Judgement impaired   Affect blunted, flat   Mood mood is calm   Physical Appearance/Attire disheveled;untidy   Hygiene neglected grooming - unclean body, hair, teeth   Suicidality other (see comments)  (Denies)   1. Wish to be Dead No   2. Non-Specific Active Suicidal Thoughts  No   Self Injury other (see comment)  (Denies)   Activity isolative;withdrawn   Speech clear;coherent   Medication Sensitivity no observed side effects;no stated side effects   Psychomotor / Gait steady;balanced     Pt. Was appropriate and polite through shift. Pt. Showered at the end of evening shift with prompting. Pt. Eating and sleeping habits are standard. Pt. Denies SI/SIB. Pt. Appears responding. Pt. Had not outbursts of aggression on evening shift. Isolative and withdrawn do to space restriction.

## 2019-08-14 NOTE — PROGRESS NOTES
[facility], [place]   Psychiatric Progress Note  Brayden Adrian  [id number]  08/14/19    Chief Complaint: Continued medical care          Interim History:   The patient's care was discussed with the treatment team during the daily team meeting and/or staff's chart notes were reviewed.  Staff report patient has been describing visions of monsters and staying to himself in his room he slept about 7 hours.    Upon visiting with him he says that he is feeling good he is just currently resting and relaxing.  Denies any current concerns or anxiety about anything.  Still has some disorganization attention concentration issues and memory deficits.  Denies any thoughts of harming himself or others or any anhedonia hopelessness or helplessness or guilty feelings.  He is still planning on going out to dinner when he gets back to his group home and is future focused about this.  He did do some exercising in his room and walked around.  Denies any hallucinations or voices from Yarsani type believes it does not have the urge to grab anybody and does not have any concerns about other people trying to harm him.  Encouraged him to continue exercising.  Informed him about current circumstances with discharging.    He does mention marriage again and says that he is attempting to attach marriage to his spirit so that he can get .         Medications:       ARIPiprazole  10 mg Oral Daily     cloZAPine  200 mg Oral QAM     cloZAPine  450 mg Oral At Bedtime     DIADERM FOOT REJUVENATING   Apply externally Daily     divalproex sodium extended-release  1,500 mg Oral BID     docusate sodium  100 mg Oral BID     escitalopram  20 mg Oral Daily     fenofibrate  160 mg Oral QPM     fish oil-omega-3 fatty acids  1 g Oral BID     medroxyPROGESTERone  300 mg Intramuscular Q7 Days     metFORMIN  1,000 mg Oral BID w/meals     multivitamin, therapeutic  1 tablet Oral Daily     niacin ER  500 mg Oral At Bedtime  "    pantoprazole  20 mg Oral Daily     polyethylene glycol  17 g Oral Daily     propranolol ER  80 mg Oral Daily     simvastatin  40 mg Oral At Bedtime     vitamin D3  2,000 Units Oral Daily          Allergies:     Allergies   Allergen Reactions     Haldol [Haloperidol] Other (See Comments)     Increases pt's hallucinations           Labs:     Recent Results (from the past 24 hour(s))   Glucose by meter    Collection Time: 08/14/19  8:10 AM   Result Value Ref Range    Glucose 150 (H) 70 - 99 mg/dL   WBC and differential    Collection Time: 08/14/19  8:21 AM   Result Value Ref Range    WBC 9.0 4.0 - 11.0 10e9/L    Diff Method Automated Method     % Neutrophils 42.9 %    % Lymphocytes 40.8 %    % Monocytes 12.6 %    % Eosinophils 1.2 %    % Basophils 1.0 %    % Immature Granulocytes 1.5 %    Nucleated RBCs 0 0 /100    Absolute Neutrophil 3.8 1.6 - 8.3 10e9/L    Absolute Lymphocytes 3.7 0.8 - 5.3 10e9/L    Absolute Monocytes 1.1 0.0 - 1.3 10e9/L    Absolute Eosinophils 0.1 0.0 - 0.7 10e9/L    Absolute Basophils 0.1 0.0 - 0.2 10e9/L    Abs Immature Granulocytes 0.1 0 - 0.4 10e9/L    Absolute Nucleated RBC 0.0           Psychiatric Examination:     /72   Pulse 88   Temp 98.2  F (36.8  C) (Oral)   Resp 18   Ht 1.753 m (5' 9\")   Wt 101.6 kg (224 lb)   SpO2 98%   BMI 33.08 kg/m    Weight is 224 lbs 0 oz  Body mass index is 33.08 kg/m .  Lying Orthostatic BP: 114/73      Lying Orthostatic Pulse: 89 bpm      Sitting Orthostatic BP: 134/85      Sitting Orthostatic Pulse: 79 bpm      Standing Orthostatic BP: 121/78      Standing Orthostatic Pulse: 87 bpm       Weight over time:  Vitals:    06/26/19 1527 06/26/19 2102   Weight: 94.8 kg (209 lb) 101.6 kg (224 lb)       Orthostatic Vitals       Most Recent      Lying Orthostatic /73 08/13 1700    Lying Orthostatic Pulse (bpm) 89 08/13 1700            Cardiometabolic risk assessment. 08/14/19      Reviewed patient profile for cardiometabolic risk factors    Date " taken /Value  REFERENCE RANGE   Abdominal Obesity  (Waist Circumference)   See nursing flowsheet Women ?35 in (88 cm)   Men ?40 in (102 cm)      Triglycerides  No results found for: TRIG    ?150 mg/dL (1.7 mmol/L) or current treatment for elevated triglycerides   HDL cholesterol  No results found for: HDL]   Women <50 mg/dL (1.3 mmol/L) in women or current treatment for low HDL cholesterol  Men <40 mg/dL (1 mmol/L) in men or current treatment for low HDL cholesterol     Fasting plasma glucose (FPG) Lab Results   Component Value Date     06/26/2019      FPG ?100 mg/dL (5.6 mmol/L) or treatment for elevated blood glucose   Blood pressure  BP Readings from Last 3 Encounters:   08/14/19 110/72   04/02/19 110/73    Blood pressure ?130/85 mmHg or treatment for elevated blood pressure   Family History  See family history         Brayden is a 54-year-old male with white hair and eyeglasses and is overweight.  His speech is simplistic and of an appropriate rate and tone.  His behavior is appropriate and he does not appear to have any abnormal movements.  His affect is neutral.  His mood he describes as good.  His thought content consists of the above without thoughts of harming himself or others possible delusions at times.  His thought process is concrete with sometimes experiencing looseness of association.  He may have abnormal perceptions at times.  He is alert but not aware of current circumstances completely.  His attention and concentration is limited to impaired.  His long-term/short-term/remote memory is limited to impaired.  His insight and judgment are both impaired.            Precautions:     Behavioral Orders   Procedures     Assault precautions     Code 1 - Restrict to Unit     Routine Programming     As clinically indicated     Sexual precautions     Status 15     Every 15 minutes.     Status Individual Observation     Male only.  10 feet space restriction from peers and staff. Staff to stay 10 ft when  patient in hallway. OK to sit at door when patient in room.     Order Specific Question:   CONTINUOUS 24 hours / day     Answer:   Other     Order Specific Question:   Specify distance     Answer:   Male only.  10 feet space restriction from peers and staff. Staff to stay 10 ft when patient in hallway. OK to sit at door when patient in room.     Order Specific Question:   Indications for SIO     Answer:   Assault risk     Order Specific Question:   Indications for SIO     Answer:   Severe intrusiveness          DIagnoses:     Schizoaffective disorder bipolar type  Mild intellectual disability  History of Tardive Dyskinesia  History of TBI  Polysubstance use disorder, in remission         Assessment & Plan:     Brayden continues to be at his usual level of function and interacts appropriately without grabbing anybody.  He has not had any aggressive behaviors and has been relatively redirectable.  We attempted to encourage him to exercise.  He continues to be focused on  people in marriage and is planning on studying that later.  It seems to be highly important to him to be  in the future.  I wonder if this has something to do with him not going to hell in his believes.  Awaiting funding so he can go back to the group home.    Continue voluntary by guardian    The risks, benefits, alternatives and side effects have been discussed and are understood by the patient and other caregivers.      Cayetano Mosher  [facility]      The following document has been created with voice recognition software and may contain unintentional word substitutions.    Non clinically relevant CMS requirements:  Clinical Global Impressions  First:  Considering your total clinical experience with this particular patient population, how severe are the patient's symptoms at this time?: 6 (06/28/19 2011)  Compared to the patient's condition at the START of treatment, this patient's condition is:: 4  (06/28/19 2011)  Most recent:  Considering your total clinical experience with this particular patient population, how severe are the patient's symptoms at this time?: 2 (08/12/19 0842)  Compared to the patient's condition at the START of treatment, this patient's condition is:: 2 (08/12/19 0842)

## 2019-08-14 NOTE — PROGRESS NOTES
The CADI screening did occur yesterday at 1pm.    The meeting included the Faisal Potter CADI screener, father/guardian, and group home staff (Matt.)  The CADI screener told me she would submit her work and ask that the case be expedited (so pt can be dc'ed back to his group home asap.)    Dr Mosher called pt's primary care doctor and confirmed that she could and would handle pt's Depo Provera injections.

## 2019-08-14 NOTE — PROGRESS NOTES
08/14/19 1400   Behavioral Health   Hallucinations visual   Thinking poor concentration;distractable   Orientation person: oriented;place: oriented   Memory baseline memory   Insight poor   Judgement impaired   Eye Contact into space   Affect blunted, flat   Mood mood is calm   Physical Appearance/Attire disheveled   Hygiene neglected grooming - unclean body, hair, teeth   Suicidality other (see comments)   1. Wish to be Dead No   2. Non-Specific Active Suicidal Thoughts  No   Self Injury other (see comment)  (denies)   Elopement   (no value)   Activity refusal   Speech clear;coherent   Medication Sensitivity no observed side effects;no stated side effects   Psychomotor / Gait balanced;steady   Activities of Daily Living   Hygiene/Grooming prompts;independent   Oral Hygiene prompts;independent   Dress scrubs (behavioral health)   Laundry unable to complete   Room Organization independent         Patient is calm and cooperative. Pt is having visionary hallucination. Pt is tired. No SI or SIB. Pt is isolative in room, pt listens to directions, walks and exercises when prompted.

## 2019-08-14 NOTE — PROGRESS NOTES
"Pt takes all scheduled medications this morning and states he has concerns of \"a bothered mind\" pt did not elaborate and when writer checking blood glucose this morning, pt states \"you wish\" in response to writer counting prior to finger stick. Blood Glucose of 150 Discussion of patient's need for activity dicussed in team today and pt informed of importance of getting adequate activity for overall health and wellness. Discussed goal of engaging in activity twice daily such as taking shower, riding stationary bike and/or movement group. Pt given bicycle today at 2 pm and instructed to ride it 30 minutes taking breaks as needed; pt been up and down using bike the last hour; continue SIO with precautions as ordered for safety..   "

## 2019-08-15 LAB — GLUCOSE BLDC GLUCOMTR-MCNC: 139 MG/DL (ref 70–99)

## 2019-08-15 PROCEDURE — 25000132 ZZH RX MED GY IP 250 OP 250 PS 637: Performed by: PSYCHIATRY & NEUROLOGY

## 2019-08-15 PROCEDURE — 12400001 ZZH R&B MH UMMC

## 2019-08-15 PROCEDURE — 25000132 ZZH RX MED GY IP 250 OP 250 PS 637: Performed by: STUDENT IN AN ORGANIZED HEALTH CARE EDUCATION/TRAINING PROGRAM

## 2019-08-15 PROCEDURE — 93005 ELECTROCARDIOGRAM TRACING: CPT

## 2019-08-15 PROCEDURE — 00000146 ZZHCL STATISTIC GLUCOSE BY METER IP

## 2019-08-15 RX ADMIN — CLOZAPINE 200 MG: 100 TABLET ORAL at 08:28

## 2019-08-15 RX ADMIN — NIACIN 500 MG: 500 TABLET, FILM COATED, EXTENDED RELEASE ORAL at 19:00

## 2019-08-15 RX ADMIN — CLOZAPINE 450 MG: 100 TABLET ORAL at 19:00

## 2019-08-15 RX ADMIN — METFORMIN HYDROCHLORIDE 1000 MG: 500 TABLET, FILM COATED ORAL at 08:29

## 2019-08-15 RX ADMIN — DIVALPROEX SODIUM 1500 MG: 500 TABLET, FILM COATED, EXTENDED RELEASE ORAL at 19:00

## 2019-08-15 RX ADMIN — PANTOPRAZOLE SODIUM 20 MG: 20 TABLET, DELAYED RELEASE ORAL at 08:28

## 2019-08-15 RX ADMIN — THERA TABS 1 TABLET: TAB at 08:30

## 2019-08-15 RX ADMIN — DOCUSATE SODIUM 100 MG: 100 CAPSULE, LIQUID FILLED ORAL at 19:00

## 2019-08-15 RX ADMIN — METFORMIN HYDROCHLORIDE 1000 MG: 500 TABLET, FILM COATED ORAL at 17:27

## 2019-08-15 RX ADMIN — PROPRANOLOL HYDROCHLORIDE 80 MG: 80 CAPSULE, EXTENDED RELEASE ORAL at 08:41

## 2019-08-15 RX ADMIN — POLYETHYLENE GLYCOL 3350 17 G: 17 POWDER, FOR SOLUTION ORAL at 08:30

## 2019-08-15 RX ADMIN — MELATONIN 2000 UNITS: at 08:30

## 2019-08-15 RX ADMIN — FENOFIBRATE 160 MG: 160 TABLET, FILM COATED ORAL at 19:00

## 2019-08-15 RX ADMIN — DIVALPROEX SODIUM 1500 MG: 500 TABLET, FILM COATED, EXTENDED RELEASE ORAL at 08:29

## 2019-08-15 RX ADMIN — Medication 1 G: at 08:29

## 2019-08-15 RX ADMIN — ACETAMINOPHEN 650 MG: 325 TABLET, FILM COATED ORAL at 08:28

## 2019-08-15 RX ADMIN — ARIPIPRAZOLE 10 MG: 10 TABLET ORAL at 08:30

## 2019-08-15 RX ADMIN — DOCUSATE SODIUM 100 MG: 100 CAPSULE, LIQUID FILLED ORAL at 08:30

## 2019-08-15 RX ADMIN — ESCITALOPRAM OXALATE 20 MG: 20 TABLET ORAL at 08:29

## 2019-08-15 RX ADMIN — Medication 1 G: at 19:00

## 2019-08-15 RX ADMIN — SIMVASTATIN 40 MG: 20 TABLET, FILM COATED ORAL at 19:00

## 2019-08-15 ASSESSMENT — ACTIVITIES OF DAILY LIVING (ADL)
ORAL_HYGIENE: PROMPTS;INDEPENDENT
HYGIENE/GROOMING: PROMPTS;INDEPENDENT
DRESS: SCRUBS (BEHAVIORAL HEALTH)
HYGIENE/GROOMING: PROMPTS
ORAL_HYGIENE: PROMPTS
DRESS: SCRUBS (BEHAVIORAL HEALTH)
LAUNDRY: UNABLE TO COMPLETE

## 2019-08-15 NOTE — PROGRESS NOTES
Pt spent the shift in his room, calm and cooperative. Earlier during the day pt complained of a headache and chest pain (RN and MD notified and pt was assessed). Pt used the bike for about fifteen minutes for some exercise. There were no concerns regarding elopement nor aggression. No SI/SIB/HI and no AVH noted or observed.     08/15/19 8538   Behavioral Health   Hallucinations denies / not responding to hallucinations   Thinking paranoid;poor concentration   Orientation person: oriented;place: oriented;date: oriented;time: oriented   Memory baseline memory   Insight poor   Judgement impaired   Eye Contact staring;out of corner of eyes;at examiner   Affect blunted, flat   Mood mood is calm   Physical Appearance/Attire disheveled   Hygiene other (see comment)  (fair)   Suicidality other (see comments)  (denies at this time)   1. Wish to be Dead No   2. Non-Specific Active Suicidal Thoughts  No   Self Injury other (see comment)  (denies; none observed)   Elopement   (nothing to note)   Activity withdrawn   Speech clear   Medication Sensitivity no stated side effects;no observed side effects   Psychomotor / Gait balanced;steady   Activities of Daily Living   Hygiene/Grooming prompts;independent   Oral Hygiene prompts;independent   Dress scrubs (behavioral health)   Room Organization prompts

## 2019-08-15 NOTE — PROVIDER NOTIFICATION
08/15/19 0841   Vital Signs   Temp 98  F (36.7  C)   Temp src Oral   Resp 16   Pulse 98   Pulse/Heart Rate Source Monitor   /77   BP Location Left arm   Oxygen Therapy   SpO2 98 %   O2 Device None (Room air)   Pain/Comfort   Patient Currently in Pain yes   Pain Body Location chest;head   Pain Radiation to   (denies radiating pain)   Quality other (see comments)  (Pt is unable or unwilling to comment on quality of the pain)   Pain Management Interventions medication (see MAR);Provider notified (comment);distraction;emotional support     Patient reported headache and chest pain this morning during AM medication administration.  VSS.  It is notable that Brayden has a history of GERD, was eating breakfast at the time of this assessment, and he had not received his AM dose of Protonix.  Brayden also has a history of laying supine the majority of the waking hours, as his negative symptoms of schizophrenia are quite severe.  RN writer provided patient education on the lifestyle modifications that are helpful with GERD management, encouraging patient to remain upright in the aftermath of eating meals and dietary modifications to reduce GERD.  Patient affirmed an understanding of this teaching- but given his cognitive limitations with borderline intellectual functioning- reinforcement is needed.  Upon review of the chart, it appears that Brayden's last EKG in Feb. 2019 was abnormal.  RN writer paged covering psychiatrist, Dr. Gilmore, and obtained an order of another EKG.  Will continue to monitor closely.

## 2019-08-15 NOTE — PROGRESS NOTES
08/14/19 2133   Behavioral Health   Hallucinations denies / not responding to hallucinations   Thinking distractable   Orientation person: oriented;place: oriented;date: oriented   Memory baseline memory   Insight poor   Judgement impaired   Eye Contact staring;cultural specific   Affect blunted, flat   Mood mood is calm   Physical Appearance/Attire disheveled   Hygiene well groomed   1. Wish to be Dead No   2. Non-Specific Active Suicidal Thoughts  No   Activity withdrawn   Speech clear   Medication Sensitivity no stated side effects   Psychomotor / Gait slow   Overt Aggression Scale   Verbal Aggression 0   Aggression against Property 0   Auto-Aggression 0   Physical Aggression 0   Overt Aggression Total Score 0   Psycho Education   Type of Intervention 1:1 intervention   Response participates, initiates socially appropriate   Hours 0.5   Treatment Detail IMR   Activities of Daily Living   Hygiene/Grooming independent   Oral Hygiene independent   Dress scrubs (behavioral health)   Room Organization independent   Pt in room all of shift and at times was social with staff. Pt ate meals. Pt rode exercise bike for five minutes. Pt states he has no thought or desire to harm self or others at this time. Pt states he does not know plan for discharge.

## 2019-08-16 LAB — GLUCOSE BLDC GLUCOMTR-MCNC: 125 MG/DL (ref 70–99)

## 2019-08-16 PROCEDURE — 25000132 ZZH RX MED GY IP 250 OP 250 PS 637: Performed by: STUDENT IN AN ORGANIZED HEALTH CARE EDUCATION/TRAINING PROGRAM

## 2019-08-16 PROCEDURE — 25000132 ZZH RX MED GY IP 250 OP 250 PS 637: Performed by: PSYCHIATRY & NEUROLOGY

## 2019-08-16 PROCEDURE — 00000146 ZZHCL STATISTIC GLUCOSE BY METER IP

## 2019-08-16 PROCEDURE — 25000128 H RX IP 250 OP 636: Performed by: STUDENT IN AN ORGANIZED HEALTH CARE EDUCATION/TRAINING PROGRAM

## 2019-08-16 PROCEDURE — 12400001 ZZH R&B MH UMMC

## 2019-08-16 PROCEDURE — 99232 SBSQ HOSP IP/OBS MODERATE 35: CPT | Performed by: PSYCHIATRY & NEUROLOGY

## 2019-08-16 RX ADMIN — DOCUSATE SODIUM 100 MG: 100 CAPSULE, LIQUID FILLED ORAL at 08:47

## 2019-08-16 RX ADMIN — Medication 1 G: at 20:40

## 2019-08-16 RX ADMIN — CLOZAPINE 200 MG: 100 TABLET ORAL at 08:47

## 2019-08-16 RX ADMIN — SIMVASTATIN 40 MG: 20 TABLET, FILM COATED ORAL at 20:39

## 2019-08-16 RX ADMIN — DIVALPROEX SODIUM 1500 MG: 500 TABLET, FILM COATED, EXTENDED RELEASE ORAL at 08:47

## 2019-08-16 RX ADMIN — MELATONIN 2000 UNITS: at 08:47

## 2019-08-16 RX ADMIN — Medication 1 G: at 08:47

## 2019-08-16 RX ADMIN — ESCITALOPRAM OXALATE 20 MG: 20 TABLET ORAL at 08:47

## 2019-08-16 RX ADMIN — DOCUSATE SODIUM 100 MG: 100 CAPSULE, LIQUID FILLED ORAL at 20:40

## 2019-08-16 RX ADMIN — NIACIN 500 MG: 500 TABLET, FILM COATED, EXTENDED RELEASE ORAL at 20:40

## 2019-08-16 RX ADMIN — FENOFIBRATE 160 MG: 160 TABLET, FILM COATED ORAL at 20:40

## 2019-08-16 RX ADMIN — PANTOPRAZOLE SODIUM 20 MG: 20 TABLET, DELAYED RELEASE ORAL at 08:47

## 2019-08-16 RX ADMIN — MEDROXYPROGESTERONE ACETATE 300 MG: 150 INJECTION, SUSPENSION INTRAMUSCULAR at 12:16

## 2019-08-16 RX ADMIN — ARIPIPRAZOLE 10 MG: 10 TABLET ORAL at 08:46

## 2019-08-16 RX ADMIN — METFORMIN HYDROCHLORIDE 1000 MG: 500 TABLET, FILM COATED ORAL at 20:40

## 2019-08-16 RX ADMIN — DIVALPROEX SODIUM 1500 MG: 500 TABLET, FILM COATED, EXTENDED RELEASE ORAL at 20:40

## 2019-08-16 RX ADMIN — PROPRANOLOL HYDROCHLORIDE 80 MG: 80 CAPSULE, EXTENDED RELEASE ORAL at 08:47

## 2019-08-16 RX ADMIN — THERA TABS 1 TABLET: TAB at 08:47

## 2019-08-16 RX ADMIN — CLOZAPINE 450 MG: 100 TABLET ORAL at 20:40

## 2019-08-16 RX ADMIN — POLYETHYLENE GLYCOL 3350 17 G: 17 POWDER, FOR SOLUTION ORAL at 08:47

## 2019-08-16 RX ADMIN — METFORMIN HYDROCHLORIDE 1000 MG: 500 TABLET, FILM COATED ORAL at 08:47

## 2019-08-16 ASSESSMENT — ACTIVITIES OF DAILY LIVING (ADL)
LAUNDRY: WITH SUPERVISION
HYGIENE/GROOMING: INDEPENDENT
ORAL_HYGIENE: INDEPENDENT
DRESS: INDEPENDENT

## 2019-08-16 NOTE — PROGRESS NOTES
[facility], [place]   Psychiatric Progress Note  Brayden Adrian  [id number]  08/16/19    Chief Complaint: Continued medical care          Interim History:   The patient's care was discussed with the treatment team during the daily team meeting and/or staff's chart notes were reviewed.  Staff report patient been describing his spirit cars and people being mad at him because he is not .  He did do some exercise and had some recent chest pain that seemed to resolve.  He slept about 5 hours overnight.    Upon meeting with him he says that he is feeling okay he is trying pictures of  people.  I asked him if this was important that he be  so that he does not go to [facility] and he says that is true.  He mentions the spirits telling him that he will go to [facility].  Denies any sadness appetite issues anhedonia thoughts of harming himself or others or any belief that he needed to grab anyone.  No hopelessness or helplessness still has attention concentration deficits and cognitive problems.  No energy issues or sleep problems.  Does not appear to be particularly anxious today and is excited about discharging back out of the hospital.  Seems to be tolerating medications without issue.         Medications:       ARIPiprazole  10 mg Oral Daily     cloZAPine  200 mg Oral QAM     cloZAPine  450 mg Oral At Bedtime     DIADERM FOOT REJUVENATING   Apply externally Daily     divalproex sodium extended-release  1,500 mg Oral BID     docusate sodium  100 mg Oral BID     escitalopram  20 mg Oral Daily     fenofibrate  160 mg Oral QPM     fish oil-omega-3 fatty acids  1 g Oral BID     medroxyPROGESTERone  300 mg Intramuscular Q7 Days     metFORMIN  1,000 mg Oral BID w/meals     multivitamin, therapeutic  1 tablet Oral Daily     niacin ER  500 mg Oral At Bedtime     pantoprazole  20 mg Oral Daily     polyethylene glycol  17 g Oral Daily     propranolol ER  80 mg Oral Daily     simvastatin  40 mg  "Oral At Bedtime     vitamin D3  2,000 Units Oral Daily          Allergies:     Allergies   Allergen Reactions     Haldol [Haloperidol] Other (See Comments)     Increases pt's hallucinations           Labs:     Recent Results (from the past 24 hour(s))   Glucose by meter    Collection Time: 08/16/19  7:37 AM   Result Value Ref Range    Glucose 125 (H) 70 - 99 mg/dL          Psychiatric Examination:     /83 (BP Location: Right arm)   Pulse 83   Temp 97.6  F (36.4  C) (Tympanic)   Resp 16   Ht 1.753 m (5' 9\")   Wt 101.6 kg (224 lb)   SpO2 100%   BMI 33.08 kg/m    Weight is 224 lbs 0 oz  Body mass index is 33.08 kg/m .  Lying Orthostatic BP: 117/83      Lying Orthostatic Pulse: 83 bpm      Sitting Orthostatic BP: 134/85      Sitting Orthostatic Pulse: 79 bpm      Standing Orthostatic BP: 121/78      Standing Orthostatic Pulse: 87 bpm       Weight over time:  Vitals:    06/26/19 1527 06/26/19 2102   Weight: 94.8 kg (209 lb) 101.6 kg (224 lb)       Orthostatic Vitals       Most Recent      Lying Orthostatic /83 08/16 0730    Lying Orthostatic Pulse (bpm) 83 08/16 0730            Cardiometabolic risk assessment. 08/16/19      Reviewed patient profile for cardiometabolic risk factors    Date taken /Value  REFERENCE RANGE   Abdominal Obesity  (Waist Circumference)   See nursing flowsheet Women ?35 in (88 cm)   Men ?40 in (102 cm)      Triglycerides  No results found for: TRIG    ?150 mg/dL (1.7 mmol/L) or current treatment for elevated triglycerides   HDL cholesterol  No results found for: HDL]   Women <50 mg/dL (1.3 mmol/L) in women or current treatment for low HDL cholesterol  Men <40 mg/dL (1 mmol/L) in men or current treatment for low HDL cholesterol     Fasting plasma glucose (FPG) Lab Results   Component Value Date     06/26/2019      FPG ?100 mg/dL (5.6 mmol/L) or treatment for elevated blood glucose   Blood pressure  BP Readings from Last 3 Encounters:   08/16/19 117/83   04/02/19 110/73    " Blood pressure ?130/85 mmHg or treatment for elevated blood pressure   Family History  See family history         Brayden is a 54-year-old male with white hair and eyeglasses and is overweight.  His speech is simplistic and of an appropriate rate and tone.  His behavior is appropriate and he does not appear to have any abnormal movements.  His affect is neutral.  His mood he describes as good.  His thought content consists of the above without thoughts of harming himself or others possible delusions at times.  His thought process is concrete with sometimes experiencing looseness of association.  He may have abnormal perceptions at times.  He is alert but not aware of current circumstances completely.  His attention and concentration is limited to impaired.  His long-term/short-term/remote memory is limited to impaired.  His insight and judgment are both impaired.            Precautions:     Behavioral Orders   Procedures     Assault precautions     Code 1 - Restrict to Unit     Routine Programming     As clinically indicated     Sexual precautions     Status 15     Every 15 minutes.     Status Individual Observation     Male only.  10 feet space restriction from peers and staff. Staff to stay 10 ft when patient in hallway. OK to sit at door when patient in room.     Order Specific Question:   CONTINUOUS 24 hours / day     Answer:   Other     Order Specific Question:   Specify distance     Answer:   Male only.  10 feet space restriction from peers and staff. Staff to stay 10 ft when patient in hallway. OK to sit at door when patient in room.     Order Specific Question:   Indications for SIO     Answer:   Assault risk     Order Specific Question:   Indications for SIO     Answer:   Severe intrusiveness          DIagnoses:     Schizoaffective disorder bipolar type  Mild intellectual disability  History of Tardive Dyskinesia  History of TBI  Polysubstance use disorder, in remission         Assessment & Plan:      Brayden continues to be at his usual level of function without recent change.  He has not been grabbing at anybody and has had no aggressive behaviors or sexual preoccupation.  He has been engaging in exercise practices in the unit and this seems to be beneficial for him.  He does have some kind of chest pain recently and says that that has now resolved.  He is focused on being  and possibly the spirit is telling him to be .  I suspect that this might have something to do with his concerns about going to [facility].  Continue to await funding approval so he can discharge out of the hospital.    Continue voluntary by guardian    The risks, benefits, alternatives and side effects have been discussed and are understood by the patient and other caregivers.      Cayetano Mosher  [facility]      The following document has been created with voice recognition software and may contain unintentional word substitutions.    Non clinically relevant CMS requirements:  Clinical Global Impressions  First:  Considering your total clinical experience with this particular patient population, how severe are the patient's symptoms at this time?: 6 (06/28/19 2011)  Compared to the patient's condition at the START of treatment, this patient's condition is:: 4 (06/28/19 2011)  Most recent:  Considering your total clinical experience with this particular patient population, how severe are the patient's symptoms at this time?: 2 (08/12/19 0842)  Compared to the patient's condition at the START of treatment, this patient's condition is:: 2 (08/12/19 0842)

## 2019-08-16 NOTE — PLAN OF CARE
BEHAVIORAL TEAM DISCUSSION    Participants: dr bonilla, dipika davidson rn, christi coleman [facility]  Progress: pt has improved since admission.    It is believed he is now at baseline functioning.    He isolates to his room where he is more comfortable (as opposed to being in the lounge, around others.)   He needs specific directives to get up and move, otherwise he would lay still in bed all day.   He's med compliant.   Recently he said he has visions of monsters in the walls that want to eat him.  Continued Stay Criteria/Rationale:  waiting for CADI funding to go through so he can be discharged back to his group home  Medical/Physical:   GERD.    EKG ordered.  Precautions:   Behavioral Orders   Procedures    Assault precautions    Code 1 - Restrict to Unit    Routine Programming     As clinically indicated    Sexual precautions    Status 15     Every 15 minutes.    Status Individual Observation     Male only.  10 feet space restriction from peers and staff. Staff to stay 10 ft when patient in hallway. OK to sit at door when patient in room.     Order Specific Question:   CONTINUOUS 24 hours / day     Answer:   Other     Order Specific Question:   Specify distance     Answer:   Male only.  10 feet space restriction from peers and staff. Staff to stay 10 ft when patient in hallway. OK to sit at door when patient in room.     Order Specific Question:   Indications for SIO     Answer:   Assault risk     Order Specific Question:   Indications for SIO     Answer:   Severe intrusiveness     Plan:  meds per dr bonilla.    Pt will return to his group home when the CADI funding is approved.  He had the CADI screening on 8-13.  His father/guardian and group home staff were present for that.     Rationale for change in precautions or plan: no change at this time.
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
## 2019-08-16 NOTE — PROGRESS NOTES
Pt had a good shift. Pt was respectful to staff. Pt was able to take a shower during shift. Pt exercised in his room for a couple of minutes. Pt spent majority of shift in his room. Pt did not attempt to grab anyone during shift. Pt did not receive any visits during shift.     08/16/19 1436   Behavioral Health   Hallucinations olfactory   Thinking distractable;paranoid   Orientation person: oriented;place: oriented;time: oriented;date: oriented   Memory baseline memory   Insight poor   Judgement impaired   Eye Contact at examiner   Affect blunted, flat   Mood mood is calm   Physical Appearance/Attire disheveled   Hygiene well groomed   Suicidality other (see comments)  (Non stated)   1. Wish to be Dead No   2. Non-Specific Active Suicidal Thoughts  No   Activity isolative;withdrawn   Speech clear   Medication Sensitivity no observed side effects   Psychomotor / Gait balanced;steady   Activities of Daily Living   Hygiene/Grooming independent   Oral Hygiene independent   Dress independent   Laundry with supervision   Room Organization independent

## 2019-08-16 NOTE — PROGRESS NOTES
Pt agreeable to taking his injection without issue. Pt currently stating that he feels well today. Continuing to encourage activity but so far pt is declining.

## 2019-08-17 LAB
GLUCOSE BLDC GLUCOMTR-MCNC: 117 MG/DL (ref 70–99)
GLUCOSE BLDC GLUCOMTR-MCNC: 134 MG/DL (ref 70–99)

## 2019-08-17 PROCEDURE — 00000146 ZZHCL STATISTIC GLUCOSE BY METER IP

## 2019-08-17 PROCEDURE — 25000132 ZZH RX MED GY IP 250 OP 250 PS 637: Performed by: PSYCHIATRY & NEUROLOGY

## 2019-08-17 PROCEDURE — 25000132 ZZH RX MED GY IP 250 OP 250 PS 637: Performed by: STUDENT IN AN ORGANIZED HEALTH CARE EDUCATION/TRAINING PROGRAM

## 2019-08-17 PROCEDURE — 12400001 ZZH R&B MH UMMC

## 2019-08-17 RX ADMIN — SIMVASTATIN 40 MG: 20 TABLET, FILM COATED ORAL at 19:07

## 2019-08-17 RX ADMIN — METFORMIN HYDROCHLORIDE 1000 MG: 500 TABLET, FILM COATED ORAL at 08:29

## 2019-08-17 RX ADMIN — THERA TABS 1 TABLET: TAB at 08:29

## 2019-08-17 RX ADMIN — CLOZAPINE 450 MG: 100 TABLET ORAL at 19:09

## 2019-08-17 RX ADMIN — DOCUSATE SODIUM 100 MG: 100 CAPSULE, LIQUID FILLED ORAL at 08:29

## 2019-08-17 RX ADMIN — POLYETHYLENE GLYCOL 3350 17 G: 17 POWDER, FOR SOLUTION ORAL at 08:29

## 2019-08-17 RX ADMIN — FENOFIBRATE 160 MG: 160 TABLET, FILM COATED ORAL at 19:07

## 2019-08-17 RX ADMIN — Medication 1 G: at 19:09

## 2019-08-17 RX ADMIN — NIACIN 500 MG: 500 TABLET, FILM COATED, EXTENDED RELEASE ORAL at 19:09

## 2019-08-17 RX ADMIN — ESCITALOPRAM OXALATE 20 MG: 20 TABLET ORAL at 08:29

## 2019-08-17 RX ADMIN — METFORMIN HYDROCHLORIDE 1000 MG: 500 TABLET, FILM COATED ORAL at 19:07

## 2019-08-17 RX ADMIN — DIVALPROEX SODIUM 1500 MG: 500 TABLET, FILM COATED, EXTENDED RELEASE ORAL at 08:29

## 2019-08-17 RX ADMIN — DOCUSATE SODIUM 100 MG: 100 CAPSULE, LIQUID FILLED ORAL at 19:09

## 2019-08-17 RX ADMIN — CLOZAPINE 200 MG: 100 TABLET ORAL at 08:30

## 2019-08-17 RX ADMIN — PROPRANOLOL HYDROCHLORIDE 80 MG: 80 CAPSULE, EXTENDED RELEASE ORAL at 08:29

## 2019-08-17 RX ADMIN — PANTOPRAZOLE SODIUM 20 MG: 20 TABLET, DELAYED RELEASE ORAL at 08:29

## 2019-08-17 RX ADMIN — DIVALPROEX SODIUM 1500 MG: 500 TABLET, FILM COATED, EXTENDED RELEASE ORAL at 19:09

## 2019-08-17 RX ADMIN — ARIPIPRAZOLE 10 MG: 10 TABLET ORAL at 08:29

## 2019-08-17 RX ADMIN — MELATONIN 2000 UNITS: at 08:29

## 2019-08-17 RX ADMIN — Medication 1 G: at 08:29

## 2019-08-17 ASSESSMENT — ACTIVITIES OF DAILY LIVING (ADL)
ORAL_HYGIENE: INDEPENDENT;PROMPTS
LAUNDRY: UNABLE TO COMPLETE
LAUNDRY: UNABLE TO COMPLETE
ORAL_HYGIENE: INDEPENDENT
DRESS: INDEPENDENT;PROMPTS
HYGIENE/GROOMING: INDEPENDENT
DRESS: SCRUBS (BEHAVIORAL HEALTH)
HYGIENE/GROOMING: INDEPENDENT;PROMPTS

## 2019-08-17 NOTE — PROGRESS NOTES
"   08/17/19 1400   Behavioral Health   Hallucinations denies / not responding to hallucinations   Thinking distractable;poor concentration   Orientation person: oriented;place: oriented   Memory baseline memory   Insight poor   Judgement impaired   Eye Contact into space;at examiner   Affect blunted, flat   Mood mood is calm   Physical Appearance/Attire attire appropriate to age and situation   Hygiene neglected grooming - unclean body, hair, teeth   Suicidality other (see comments)  (Pt denies)   1. Wish to be Dead No   2. Non-Specific Active Suicidal Thoughts  No   Self Injury other (see comment)  (None stated nor observed)   Elopement   (None stated nor observed)   Activity isolative   Speech flight of ideas;clear;coherent   Medication Sensitivity no stated side effects;no observed side effects   Psychomotor / Gait balanced;steady   Activities of Daily Living   Hygiene/Grooming independent   Oral Hygiene independent   Dress scrubs (behavioral health)   Laundry unable to complete   Room Organization unable     Brayden had a good shift.     Pt was isolative to room and didn't make any requests to come out. Brayden would sleep and watch tv intermittently. Brayden did not have a daily goal today but appeared to be in a favorable mood and ate regularly. There were no notable behavioral concerns involving pt thus far this shift.     Brayden denies SI/SIB    When asked about any concerns or questions, he replied, \"when's snack?\"  "

## 2019-08-18 LAB — GLUCOSE BLDC GLUCOMTR-MCNC: 130 MG/DL (ref 70–99)

## 2019-08-18 PROCEDURE — 25000132 ZZH RX MED GY IP 250 OP 250 PS 637: Performed by: STUDENT IN AN ORGANIZED HEALTH CARE EDUCATION/TRAINING PROGRAM

## 2019-08-18 PROCEDURE — 12400001 ZZH R&B MH UMMC

## 2019-08-18 PROCEDURE — 00000146 ZZHCL STATISTIC GLUCOSE BY METER IP

## 2019-08-18 PROCEDURE — 25000132 ZZH RX MED GY IP 250 OP 250 PS 637: Performed by: PSYCHIATRY & NEUROLOGY

## 2019-08-18 RX ADMIN — THERA TABS 1 TABLET: TAB at 08:13

## 2019-08-18 RX ADMIN — METFORMIN HYDROCHLORIDE 1000 MG: 500 TABLET, FILM COATED ORAL at 19:52

## 2019-08-18 RX ADMIN — Medication 1 G: at 08:13

## 2019-08-18 RX ADMIN — DOCUSATE SODIUM 100 MG: 100 CAPSULE, LIQUID FILLED ORAL at 08:13

## 2019-08-18 RX ADMIN — DOCUSATE SODIUM 100 MG: 100 CAPSULE, LIQUID FILLED ORAL at 19:52

## 2019-08-18 RX ADMIN — CLOZAPINE 450 MG: 100 TABLET ORAL at 19:52

## 2019-08-18 RX ADMIN — DIVALPROEX SODIUM 1500 MG: 500 TABLET, FILM COATED, EXTENDED RELEASE ORAL at 08:13

## 2019-08-18 RX ADMIN — DIVALPROEX SODIUM 1500 MG: 500 TABLET, FILM COATED, EXTENDED RELEASE ORAL at 19:52

## 2019-08-18 RX ADMIN — NIACIN 500 MG: 500 TABLET, FILM COATED, EXTENDED RELEASE ORAL at 19:52

## 2019-08-18 RX ADMIN — Medication 1 G: at 19:53

## 2019-08-18 RX ADMIN — POLYETHYLENE GLYCOL 3350 17 G: 17 POWDER, FOR SOLUTION ORAL at 08:13

## 2019-08-18 RX ADMIN — PANTOPRAZOLE SODIUM 20 MG: 20 TABLET, DELAYED RELEASE ORAL at 08:13

## 2019-08-18 RX ADMIN — ARIPIPRAZOLE 10 MG: 10 TABLET ORAL at 08:13

## 2019-08-18 RX ADMIN — SIMVASTATIN 40 MG: 20 TABLET, FILM COATED ORAL at 19:52

## 2019-08-18 RX ADMIN — MELATONIN 2000 UNITS: at 08:13

## 2019-08-18 RX ADMIN — METFORMIN HYDROCHLORIDE 1000 MG: 500 TABLET, FILM COATED ORAL at 08:13

## 2019-08-18 RX ADMIN — PROPRANOLOL HYDROCHLORIDE 80 MG: 80 CAPSULE, EXTENDED RELEASE ORAL at 08:13

## 2019-08-18 RX ADMIN — CLOZAPINE 200 MG: 100 TABLET ORAL at 08:13

## 2019-08-18 RX ADMIN — FENOFIBRATE 160 MG: 160 TABLET, FILM COATED ORAL at 19:52

## 2019-08-18 RX ADMIN — ESCITALOPRAM OXALATE 20 MG: 20 TABLET ORAL at 08:13

## 2019-08-18 ASSESSMENT — ACTIVITIES OF DAILY LIVING (ADL)
HYGIENE/GROOMING: INDEPENDENT;PROMPTS
LAUNDRY: UNABLE TO COMPLETE
ORAL_HYGIENE: INDEPENDENT;PROMPTS
DRESS: INDEPENDENT
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
## 2019-08-19 LAB — GLUCOSE BLDC GLUCOMTR-MCNC: 127 MG/DL (ref 70–99)

## 2019-08-19 PROCEDURE — 25000132 ZZH RX MED GY IP 250 OP 250 PS 637: Performed by: STUDENT IN AN ORGANIZED HEALTH CARE EDUCATION/TRAINING PROGRAM

## 2019-08-19 PROCEDURE — 12400001 ZZH R&B MH UMMC

## 2019-08-19 PROCEDURE — 25000132 ZZH RX MED GY IP 250 OP 250 PS 637: Performed by: PSYCHIATRY & NEUROLOGY

## 2019-08-19 PROCEDURE — 99232 SBSQ HOSP IP/OBS MODERATE 35: CPT | Performed by: PSYCHIATRY & NEUROLOGY

## 2019-08-19 PROCEDURE — 00000146 ZZHCL STATISTIC GLUCOSE BY METER IP

## 2019-08-19 RX ADMIN — METFORMIN HYDROCHLORIDE 1000 MG: 500 TABLET, FILM COATED ORAL at 08:14

## 2019-08-19 RX ADMIN — CLOZAPINE 200 MG: 100 TABLET ORAL at 08:13

## 2019-08-19 RX ADMIN — CLOZAPINE 450 MG: 100 TABLET ORAL at 19:26

## 2019-08-19 RX ADMIN — FENOFIBRATE 160 MG: 160 TABLET, FILM COATED ORAL at 19:27

## 2019-08-19 RX ADMIN — PANTOPRAZOLE SODIUM 20 MG: 20 TABLET, DELAYED RELEASE ORAL at 08:14

## 2019-08-19 RX ADMIN — DIVALPROEX SODIUM 1500 MG: 500 TABLET, FILM COATED, EXTENDED RELEASE ORAL at 19:27

## 2019-08-19 RX ADMIN — POLYETHYLENE GLYCOL 3350 17 G: 17 POWDER, FOR SOLUTION ORAL at 08:14

## 2019-08-19 RX ADMIN — PROPRANOLOL HYDROCHLORIDE 80 MG: 80 CAPSULE, EXTENDED RELEASE ORAL at 08:14

## 2019-08-19 RX ADMIN — SIMVASTATIN 40 MG: 20 TABLET, FILM COATED ORAL at 19:27

## 2019-08-19 RX ADMIN — Medication 1 G: at 08:13

## 2019-08-19 RX ADMIN — DOCUSATE SODIUM 100 MG: 100 CAPSULE, LIQUID FILLED ORAL at 08:13

## 2019-08-19 RX ADMIN — ARIPIPRAZOLE 10 MG: 10 TABLET ORAL at 08:13

## 2019-08-19 RX ADMIN — MELATONIN 2000 UNITS: at 08:14

## 2019-08-19 RX ADMIN — DOCUSATE SODIUM 100 MG: 100 CAPSULE, LIQUID FILLED ORAL at 19:27

## 2019-08-19 RX ADMIN — METFORMIN HYDROCHLORIDE 1000 MG: 500 TABLET, FILM COATED ORAL at 17:02

## 2019-08-19 RX ADMIN — Medication 1 G: at 19:27

## 2019-08-19 RX ADMIN — THERA TABS 1 TABLET: TAB at 08:14

## 2019-08-19 RX ADMIN — NIACIN 500 MG: 500 TABLET, FILM COATED, EXTENDED RELEASE ORAL at 19:27

## 2019-08-19 RX ADMIN — DIVALPROEX SODIUM 1500 MG: 500 TABLET, FILM COATED, EXTENDED RELEASE ORAL at 08:13

## 2019-08-19 RX ADMIN — ESCITALOPRAM OXALATE 20 MG: 20 TABLET ORAL at 08:13

## 2019-08-19 ASSESSMENT — ACTIVITIES OF DAILY LIVING (ADL)
HYGIENE/GROOMING: INDEPENDENT;PROMPTS
DRESS: INDEPENDENT
LAUNDRY: UNABLE TO COMPLETE
DRESS: SCRUBS (BEHAVIORAL HEALTH)
ORAL_HYGIENE: INDEPENDENT;PROMPTS
HYGIENE/GROOMING: PROMPTS
ORAL_HYGIENE: PROMPTS

## 2019-08-19 NOTE — PROGRESS NOTES
I received a vm message from the Faisal Potter CADI screener.   She said the case is being processed and won't take too much longer.
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
## 2019-08-19 NOTE — PROGRESS NOTES
No behavioral issue this evening. Pt spent most of this evening in his room resting in bed watching TV. He remains medication compliant and denied SE's.

## 2019-08-19 NOTE — PROGRESS NOTES
[facility], [place]   Psychiatric Progress Note  Brayden Adrian  [id number]  08/19/19    Chief Complaint: Continued medical care          Interim History:   The patient's care was discussed with the treatment team during the daily team meeting and/or staff's chart notes were reviewed.  Staff report patient has been hungry having any issues over the weekend and slept about 6 hours.    Upon meeting with him he says that he is not feeling well and that his body is cramping.  Upon examining him I do not see any obvious reason for injury or cramping.  He does not appear to have any redness to his legs and appears a normal color.  We have been concerned about his sedentary  behavior here on the unit and he was encouraged to exercise and walk around.  He makes odd statements about people taking too much blood from him and him getting a little girl for his dad from the television.  Mentions someone going to hell and mentions that most of this is his imagination.  He is not currently anxious or worried about anything and denies any hallucinations or paranoia still has some disorganization no hopelessness or helplessness.  No sleep problems seems to have less energy and says that his appetite is less today.  Still has attention concentration issues and no urges to grab anyone or harm anyone.  He describes the pretty people that he sees in the hallway.         Medications:       ARIPiprazole  10 mg Oral Daily     cloZAPine  200 mg Oral QAM     cloZAPine  450 mg Oral At Bedtime     DIADERM FOOT REJUVENATING   Apply externally Daily     divalproex sodium extended-release  1,500 mg Oral BID     docusate sodium  100 mg Oral BID     escitalopram  20 mg Oral Daily     fenofibrate  160 mg Oral QPM     fish oil-omega-3 fatty acids  1 g Oral BID     medroxyPROGESTERone  300 mg Intramuscular Q7 Days     metFORMIN  1,000 mg Oral BID w/meals     multivitamin, therapeutic  1 tablet Oral Daily     niacin ER   "500 mg Oral At Bedtime     pantoprazole  20 mg Oral Daily     polyethylene glycol  17 g Oral Daily     propranolol ER  80 mg Oral Daily     simvastatin  40 mg Oral At Bedtime     vitamin D3  2,000 Units Oral Daily          Allergies:     Allergies   Allergen Reactions     Haldol [Haloperidol] Other (See Comments)     Increases pt's hallucinations           Labs:     Recent Results (from the past 24 hour(s))   Glucose by meter    Collection Time: 08/19/19  8:21 AM   Result Value Ref Range    Glucose 127 (H) 70 - 99 mg/dL          Psychiatric Examination:     /80   Pulse 90   Temp 98.5  F (36.9  C)   Resp 16   Ht 1.753 m (5' 9\")   Wt 101.6 kg (224 lb)   SpO2 100%   BMI 33.08 kg/m    Weight is 224 lbs 0 oz  Body mass index is 33.08 kg/m .  Lying Orthostatic BP: 125/70      Lying Orthostatic Pulse: 94 bpm      Sitting Orthostatic BP: 134/85      Sitting Orthostatic Pulse: 79 bpm      Standing Orthostatic BP: 121/78      Standing Orthostatic Pulse: 87 bpm       Weight over time:  Vitals:    06/26/19 1527 06/26/19 2102   Weight: 94.8 kg (209 lb) 101.6 kg (224 lb)       Orthostatic Vitals       Most Recent      Lying Orthostatic /70 08/18 0941    Lying Orthostatic Pulse (bpm) 94 08/18 0941            Cardiometabolic risk assessment. 08/19/19      Reviewed patient profile for cardiometabolic risk factors    Date taken /Value  REFERENCE RANGE   Abdominal Obesity  (Waist Circumference)   See nursing flowsheet Women ?35 in (88 cm)   Men ?40 in (102 cm)      Triglycerides  No results found for: TRIG    ?150 mg/dL (1.7 mmol/L) or current treatment for elevated triglycerides   HDL cholesterol  No results found for: HDL]   Women <50 mg/dL (1.3 mmol/L) in women or current treatment for low HDL cholesterol  Men <40 mg/dL (1 mmol/L) in men or current treatment for low HDL cholesterol     Fasting plasma glucose (FPG) Lab Results   Component Value Date     06/26/2019      FPG ?100 mg/dL (5.6 mmol/L) or " treatment for elevated blood glucose   Blood pressure  BP Readings from Last 3 Encounters:   08/17/19 131/80   04/02/19 110/73    Blood pressure ?130/85 mmHg or treatment for elevated blood pressure   Family History  See family history         Brayden is a 54-year-old male with white hair and eyeglasses and is overweight.  His speech is simplistic and of an appropriate rate and tone.  His behavior is appropriate and he does not appear to have any abnormal movements.  His affect is neutral.  His mood he describes as not good.  His thought content consists of the above without thoughts of harming himself or others possible delusions at times.  His thought process is concrete with sometimes experiencing looseness of association.  He may have abnormal perceptions at times.  He is alert but not aware of current circumstances completely.  His attention and concentration is limited to impaired.  His long-term/short-term/remote memory is limited to impaired.  His insight and judgment are both impaired.            Precautions:     Behavioral Orders   Procedures     Assault precautions     Code 1 - Restrict to Unit     Routine Programming     As clinically indicated     Sexual precautions     Status 15     Every 15 minutes.     Status Individual Observation     Male only.  10 feet space restriction from peers and staff. Staff to stay 10 ft when patient in hallway. OK to sit at door when patient in room.     Order Specific Question:   CONTINUOUS 24 hours / day     Answer:   Other     Order Specific Question:   Specify distance     Answer:   Male only.  10 feet space restriction from peers and staff. Staff to stay 10 ft when patient in hallway. OK to sit at door when patient in room.     Order Specific Question:   Indications for SIO     Answer:   Assault risk     Order Specific Question:   Indications for SIO     Answer:   Severe intrusiveness          DIagnoses:     Schizoaffective disorder bipolar type  Mild intellectual  disability  History of Tardive Dyskinesia  History of TBI  Polysubstance use disorder, in remission         Assessment & Plan:     Brayden continues to be at his usual level of function though is describing some cramping in his body he appears to have full range of motion and the ability to move both of his extremities.  I did examine his lower extremities to see if there is any concerns for DVTs based on his level of sedentary activity.  I do not see any signs of this type of issue.  Does not mention  people as much today seems to be wanting to keep to himself.  Encouraged him to get up and stretch and exercise that likely reason for his soreness all over his body is his lying in bed.    Continue voluntary by guardian    The risks, benefits, alternatives and side effects have been discussed and are understood by the patient and other caregivers.      Cayetano Mosher  [facility]      The following document has been created with voice recognition software and may contain unintentional word substitutions.    Non clinically relevant CMS requirements:  Clinical Global Impressions  First:  Considering your total clinical experience with this particular patient population, how severe are the patient's symptoms at this time?: 6 (06/28/19 2011)  Compared to the patient's condition at the START of treatment, this patient's condition is:: 4 (06/28/19 2011)  Most recent:  Considering your total clinical experience with this particular patient population, how severe are the patient's symptoms at this time?: 2 (08/12/19 0842)  Compared to the patient's condition at the START of treatment, this patient's condition is:: 2 (08/12/19 0842)

## 2019-08-20 PROCEDURE — 25000132 ZZH RX MED GY IP 250 OP 250 PS 637: Performed by: PSYCHIATRY & NEUROLOGY

## 2019-08-20 PROCEDURE — 99232 SBSQ HOSP IP/OBS MODERATE 35: CPT | Performed by: PSYCHIATRY & NEUROLOGY

## 2019-08-20 PROCEDURE — 12400001 ZZH R&B MH UMMC

## 2019-08-20 PROCEDURE — 25000132 ZZH RX MED GY IP 250 OP 250 PS 637: Performed by: STUDENT IN AN ORGANIZED HEALTH CARE EDUCATION/TRAINING PROGRAM

## 2019-08-20 RX ADMIN — PROPRANOLOL HYDROCHLORIDE 80 MG: 80 CAPSULE, EXTENDED RELEASE ORAL at 09:03

## 2019-08-20 RX ADMIN — FENOFIBRATE 160 MG: 160 TABLET, FILM COATED ORAL at 19:40

## 2019-08-20 RX ADMIN — NIACIN 500 MG: 500 TABLET, FILM COATED, EXTENDED RELEASE ORAL at 19:40

## 2019-08-20 RX ADMIN — DOCUSATE SODIUM 100 MG: 100 CAPSULE, LIQUID FILLED ORAL at 19:40

## 2019-08-20 RX ADMIN — MELATONIN 2000 UNITS: at 09:02

## 2019-08-20 RX ADMIN — DIVALPROEX SODIUM 1500 MG: 500 TABLET, FILM COATED, EXTENDED RELEASE ORAL at 09:01

## 2019-08-20 RX ADMIN — Medication 1 G: at 09:01

## 2019-08-20 RX ADMIN — CLOZAPINE 450 MG: 100 TABLET ORAL at 19:39

## 2019-08-20 RX ADMIN — ESCITALOPRAM OXALATE 20 MG: 20 TABLET ORAL at 09:02

## 2019-08-20 RX ADMIN — THERA TABS 1 TABLET: TAB at 09:02

## 2019-08-20 RX ADMIN — POLYETHYLENE GLYCOL 3350 17 G: 17 POWDER, FOR SOLUTION ORAL at 09:01

## 2019-08-20 RX ADMIN — SIMVASTATIN 40 MG: 20 TABLET, FILM COATED ORAL at 19:41

## 2019-08-20 RX ADMIN — Medication 1 G: at 19:40

## 2019-08-20 RX ADMIN — METFORMIN HYDROCHLORIDE 1000 MG: 500 TABLET, FILM COATED ORAL at 18:36

## 2019-08-20 RX ADMIN — ARIPIPRAZOLE 10 MG: 10 TABLET ORAL at 09:02

## 2019-08-20 RX ADMIN — CLOZAPINE 200 MG: 100 TABLET ORAL at 09:01

## 2019-08-20 RX ADMIN — DIVALPROEX SODIUM 1500 MG: 500 TABLET, FILM COATED, EXTENDED RELEASE ORAL at 19:40

## 2019-08-20 RX ADMIN — METFORMIN HYDROCHLORIDE 1000 MG: 500 TABLET, FILM COATED ORAL at 09:01

## 2019-08-20 RX ADMIN — DOCUSATE SODIUM 100 MG: 100 CAPSULE, LIQUID FILLED ORAL at 09:02

## 2019-08-20 RX ADMIN — PANTOPRAZOLE SODIUM 20 MG: 20 TABLET, DELAYED RELEASE ORAL at 09:02

## 2019-08-20 ASSESSMENT — ACTIVITIES OF DAILY LIVING (ADL)
LAUNDRY: UNABLE TO COMPLETE
HYGIENE/GROOMING: INDEPENDENT;PROMPTS
LAUNDRY: UNABLE TO COMPLETE
DRESS: SCRUBS (BEHAVIORAL HEALTH)
HYGIENE/GROOMING: INDEPENDENT
DRESS: SCRUBS (BEHAVIORAL HEALTH)
ORAL_HYGIENE: INDEPENDENT;PROMPTS
ORAL_HYGIENE: INDEPENDENT;PROMPTS

## 2019-08-20 NOTE — PROGRESS NOTES
08/19/19 2200   Behavioral Health   Hallucinations appears responding   Thinking distractable;delusional;poor concentration   Orientation person: oriented;place: oriented   Memory baseline memory   Insight poor   Judgement impaired   Eye Contact at examiner   Affect blunted, flat   Mood mood is calm   Physical Appearance/Attire untidy;disheveled   Hygiene neglected grooming - unclean body, hair, teeth   Suicidality other (see comments)  (None stated )   1. Wish to be Dead No   2. Non-Specific Active Suicidal Thoughts  No   Self Injury other (see comment)  (None stated )   Activity isolative;withdrawn   Speech clear;coherent   Medication Sensitivity no stated side effects;no observed side effects   Psychomotor / Gait balanced;steady   Activities of Daily Living   Hygiene/Grooming independent;prompts   Oral Hygiene independent;prompts   Dress independent   Laundry unable to complete   Room Organization independent     Pt. Appeared to be calm. Pt. Was in his room all of the shift. Pt. Ate all meals. No sign of SI or SIB.

## 2019-08-20 NOTE — PROGRESS NOTES
[facility], [place]   Psychiatric Progress Note  Brayden Adrian  [id number]  08/20/19    Chief Complaint: Continued medical care          Interim History:   The patient's care was discussed with the treatment team during the daily team meeting and/or staff's chart notes were reviewed.  Staff report patient has not had any aggressive behaviors primarily staying to himself and slept about 6 hours.    Upon meeting with him says that he is okay he mentions some soreness in his right shoulder where he received his injection.  He denies any other soreness and has been walking around his room he says.  He did end up taking a shower today which was good.  He denies any thoughts of harming himself or others he does not have any hallucinations but usually talks about visions.  He said that he had a vision of never coming back to the hospital.  He is not hearing voices of other people or demons or angles.  Still has disorganized thoughts and attention concentration deficits.  No guilty feelings or anhedonia or hopelessness or helplessness.  He is future focused on discharging from hospital and possibly going swimming and eating.  Does mention possibility of having diarrhea though I do not think this is confirmed.         Medications:       ARIPiprazole  10 mg Oral Daily     cloZAPine  200 mg Oral QAM     cloZAPine  450 mg Oral At Bedtime     DIADERM FOOT REJUVENATING   Apply externally Daily     divalproex sodium extended-release  1,500 mg Oral BID     docusate sodium  100 mg Oral BID     escitalopram  20 mg Oral Daily     fenofibrate  160 mg Oral QPM     fish oil-omega-3 fatty acids  1 g Oral BID     medroxyPROGESTERone  300 mg Intramuscular Q7 Days     metFORMIN  1,000 mg Oral BID w/meals     multivitamin, therapeutic  1 tablet Oral Daily     niacin ER  500 mg Oral At Bedtime     pantoprazole  20 mg Oral Daily     polyethylene glycol  17 g Oral Daily     propranolol ER  80 mg Oral Daily      "simvastatin  40 mg Oral At Bedtime     vitamin D3  2,000 Units Oral Daily          Allergies:     Allergies   Allergen Reactions     Haldol [Haloperidol] Other (See Comments)     Increases pt's hallucinations           Labs:   No results found for this or any previous visit (from the past 24 hour(s)).       Psychiatric Examination:     /73   Pulse 82   Temp 98  F (36.7  C) (Oral)   Resp 16   Ht 1.753 m (5' 9\")   Wt 101.6 kg (224 lb)   SpO2 98%   BMI 33.08 kg/m    Weight is 224 lbs 0 oz  Body mass index is 33.08 kg/m .  Lying Orthostatic BP: 125/70      Lying Orthostatic Pulse: 94 bpm      Sitting Orthostatic BP: 123/73      Sitting Orthostatic Pulse: 82 bpm      Standing Orthostatic BP: 121/78      Standing Orthostatic Pulse: 87 bpm       Weight over time:  Vitals:    06/26/19 1527 06/26/19 2102   Weight: 94.8 kg (209 lb) 101.6 kg (224 lb)       Orthostatic Vitals       Most Recent      Sitting Orthostatic /73 08/20 0800    Sitting Orthostatic Pulse (bpm) 82 08/20 0800            Cardiometabolic risk assessment. 08/20/19      Reviewed patient profile for cardiometabolic risk factors    Date taken /Value  REFERENCE RANGE   Abdominal Obesity  (Waist Circumference)   See nursing flowsheet Women ?35 in (88 cm)   Men ?40 in (102 cm)      Triglycerides  No results found for: TRIG    ?150 mg/dL (1.7 mmol/L) or current treatment for elevated triglycerides   HDL cholesterol  No results found for: HDL]   Women <50 mg/dL (1.3 mmol/L) in women or current treatment for low HDL cholesterol  Men <40 mg/dL (1 mmol/L) in men or current treatment for low HDL cholesterol     Fasting plasma glucose (FPG) Lab Results   Component Value Date     06/26/2019      FPG ?100 mg/dL (5.6 mmol/L) or treatment for elevated blood glucose   Blood pressure  BP Readings from Last 3 Encounters:   08/20/19 123/73   04/02/19 110/73    Blood pressure ?130/85 mmHg or treatment for elevated blood pressure   Family History  See " family history         Brayden is a 54-year-old male with white hair and eyeglasses and is overweight.  His speech is simplistic and of an appropriate rate and tone.  His behavior is appropriate and he does not appear to have any abnormal movements.  His affect is neutral.  His mood he describes as ok.  His thought content consists of the above without thoughts of harming himself or others possible delusions at times.  His thought process is concrete with sometimes experiencing looseness of association.  He may have abnormal perceptions at times.  He is alert but not aware of current circumstances completely.  His attention and concentration is limited to impaired.  His long-term/short-term/remote memory is limited to impaired.  His insight and judgment are both impaired.            Precautions:     Behavioral Orders   Procedures     Assault precautions     Code 1 - Restrict to Unit     Routine Programming     As clinically indicated     Sexual precautions     Status 15     Every 15 minutes.     Status Individual Observation     Male only.  10 feet space restriction from peers and staff. Staff to stay 10 ft when patient in hallway. OK to sit at door when patient in room.     Order Specific Question:   CONTINUOUS 24 hours / day     Answer:   Other     Order Specific Question:   Specify distance     Answer:   Male only.  10 feet space restriction from peers and staff. Staff to stay 10 ft when patient in hallway. OK to sit at door when patient in room.     Order Specific Question:   Indications for SIO     Answer:   Assault risk     Order Specific Question:   Indications for SIO     Answer:   Severe intrusiveness          DIagnoses:     Schizoaffective disorder bipolar type  Mild intellectual disability  History of Tardive Dyskinesia  History of TBI  Polysubstance use disorder, in remission         Assessment & Plan:     Brayden continues to be at his usual level of function and evaluation.  He does not appear to have  any current physical ailments though he does mention possible diarrhea.  We will need to objectively evaluate these type of complaints because he will say things that are not accurate.  Do not see any current warning signs of illness or issues.  He has been walking around and I again encouraged him to continue exercising.    Continue voluntary by guardian awaiting CADI funding    The risks, benefits, alternatives and side effects have been discussed and are understood by the patient and other caregivers.      Cayetano Mosher MD  [facility]      The following document has been created with voice recognition software and may contain unintentional word substitutions.    Non clinically relevant CMS requirements:  Clinical Global Impressions  First:  Considering your total clinical experience with this particular patient population, how severe are the patient's symptoms at this time?: 6 (06/28/19 2011)  Compared to the patient's condition at the START of treatment, this patient's condition is:: 4 (06/28/19 2011)  Most recent:  Considering your total clinical experience with this particular patient population, how severe are the patient's symptoms at this time?: 2 (08/20/19 1550)  Compared to the patient's condition at the START of treatment, this patient's condition is:: 2 (08/20/19 1550)
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
## 2019-08-21 LAB
BASOPHILS # BLD AUTO: 0.1 10E9/L (ref 0–0.2)
BASOPHILS NFR BLD AUTO: 0.6 %
DIFFERENTIAL METHOD BLD: NORMAL
EOSINOPHIL # BLD AUTO: 0.1 10E9/L (ref 0–0.7)
EOSINOPHIL NFR BLD AUTO: 1.3 %
GLUCOSE BLDC GLUCOMTR-MCNC: 136 MG/DL (ref 70–99)
IMM GRANULOCYTES # BLD: 0.1 10E9/L (ref 0–0.4)
IMM GRANULOCYTES NFR BLD: 0.7 %
LYMPHOCYTES # BLD AUTO: 3.1 10E9/L (ref 0.8–5.3)
LYMPHOCYTES NFR BLD AUTO: 33.1 %
MONOCYTES # BLD AUTO: 0.8 10E9/L (ref 0–1.3)
MONOCYTES NFR BLD AUTO: 8.3 %
NEUTROPHILS # BLD AUTO: 5.3 10E9/L (ref 1.6–8.3)
NEUTROPHILS NFR BLD AUTO: 56 %
NRBC # BLD AUTO: 0 10*3/UL
NRBC BLD AUTO-RTO: 0 /100
WBC # BLD AUTO: 9.5 10E9/L (ref 4–11)

## 2019-08-21 PROCEDURE — 12400001 ZZH R&B MH UMMC

## 2019-08-21 PROCEDURE — 25000132 ZZH RX MED GY IP 250 OP 250 PS 637: Performed by: PSYCHIATRY & NEUROLOGY

## 2019-08-21 PROCEDURE — 36415 COLL VENOUS BLD VENIPUNCTURE: CPT | Performed by: PSYCHIATRY & NEUROLOGY

## 2019-08-21 PROCEDURE — 25000132 ZZH RX MED GY IP 250 OP 250 PS 637: Performed by: STUDENT IN AN ORGANIZED HEALTH CARE EDUCATION/TRAINING PROGRAM

## 2019-08-21 PROCEDURE — 99232 SBSQ HOSP IP/OBS MODERATE 35: CPT | Performed by: PSYCHIATRY & NEUROLOGY

## 2019-08-21 PROCEDURE — 85048 AUTOMATED LEUKOCYTE COUNT: CPT | Performed by: PSYCHIATRY & NEUROLOGY

## 2019-08-21 PROCEDURE — 85004 AUTOMATED DIFF WBC COUNT: CPT | Performed by: PSYCHIATRY & NEUROLOGY

## 2019-08-21 PROCEDURE — 00000146 ZZHCL STATISTIC GLUCOSE BY METER IP

## 2019-08-21 RX ADMIN — CLOZAPINE 450 MG: 100 TABLET ORAL at 20:06

## 2019-08-21 RX ADMIN — ARIPIPRAZOLE 10 MG: 10 TABLET ORAL at 08:07

## 2019-08-21 RX ADMIN — CLOZAPINE 200 MG: 100 TABLET ORAL at 08:06

## 2019-08-21 RX ADMIN — DIVALPROEX SODIUM 1500 MG: 500 TABLET, FILM COATED, EXTENDED RELEASE ORAL at 08:08

## 2019-08-21 RX ADMIN — METFORMIN HYDROCHLORIDE 1000 MG: 500 TABLET, FILM COATED ORAL at 08:07

## 2019-08-21 RX ADMIN — MELATONIN 2000 UNITS: at 08:08

## 2019-08-21 RX ADMIN — ESCITALOPRAM OXALATE 20 MG: 20 TABLET ORAL at 08:07

## 2019-08-21 RX ADMIN — FENOFIBRATE 160 MG: 160 TABLET, FILM COATED ORAL at 20:07

## 2019-08-21 RX ADMIN — SIMVASTATIN 40 MG: 20 TABLET, FILM COATED ORAL at 20:07

## 2019-08-21 RX ADMIN — PROPRANOLOL HYDROCHLORIDE 80 MG: 80 CAPSULE, EXTENDED RELEASE ORAL at 08:06

## 2019-08-21 RX ADMIN — NIACIN 500 MG: 500 TABLET, FILM COATED, EXTENDED RELEASE ORAL at 20:07

## 2019-08-21 RX ADMIN — DOCUSATE SODIUM 100 MG: 100 CAPSULE, LIQUID FILLED ORAL at 20:07

## 2019-08-21 RX ADMIN — Medication 1 G: at 20:07

## 2019-08-21 RX ADMIN — METFORMIN HYDROCHLORIDE 1000 MG: 500 TABLET, FILM COATED ORAL at 20:07

## 2019-08-21 RX ADMIN — PANTOPRAZOLE SODIUM 20 MG: 20 TABLET, DELAYED RELEASE ORAL at 08:08

## 2019-08-21 RX ADMIN — POLYETHYLENE GLYCOL 3350 17 G: 17 POWDER, FOR SOLUTION ORAL at 08:07

## 2019-08-21 RX ADMIN — THERA TABS 1 TABLET: TAB at 08:08

## 2019-08-21 RX ADMIN — DOCUSATE SODIUM 100 MG: 100 CAPSULE, LIQUID FILLED ORAL at 08:08

## 2019-08-21 RX ADMIN — Medication 1 G: at 08:07

## 2019-08-21 RX ADMIN — DIVALPROEX SODIUM 1500 MG: 500 TABLET, FILM COATED, EXTENDED RELEASE ORAL at 20:07

## 2019-08-21 ASSESSMENT — ACTIVITIES OF DAILY LIVING (ADL)
HYGIENE/GROOMING: INDEPENDENT
DRESS: SCRUBS (BEHAVIORAL HEALTH);INDEPENDENT
LAUNDRY: UNABLE TO COMPLETE
ORAL_HYGIENE: INDEPENDENT
DRESS: INDEPENDENT;PROMPTS
ORAL_HYGIENE: INDEPENDENT;PROMPTS
HYGIENE/GROOMING: INDEPENDENT;PROMPTS
LAUNDRY: UNABLE TO COMPLETE

## 2019-08-21 NOTE — PROGRESS NOTES
Pt was isolative in his room per space restriction and appeared quiet and withdrawn. Pt had no significant event this evening though he had one event in the day shift. Pt was compliant and pleasant with staff. Pt ate dinner and had snack. No behavioral concern with pt this evening. Nothing else to add.       08/20/19 9246   Behavioral Health   Hallucinations denies / not responding to hallucinations   Thinking poor concentration   Orientation person: oriented;place: oriented;time: oriented;date: oriented   Memory baseline memory   Insight poor   Judgement impaired   Eye Contact at examiner   Affect blunted, flat   Mood depressed;mood is calm   Physical Appearance/Attire disheveled   Hygiene neglected grooming - unclean body, hair, teeth   Suicidality other (see comments)  (No indications)   1. Wish to be Dead No   2. Non-Specific Active Suicidal Thoughts  No   Self Injury other (see comment)  (Mo indications)   Elopement Distress about legal situation (holds for mental health or criminal)   Activity isolative;withdrawn   Speech clear;coherent   Medication Sensitivity no stated side effects;no observed side effects   Psychomotor / Gait balanced;steady   Activities of Daily Living   Hygiene/Grooming independent   Oral Hygiene independent;prompts   Dress scrubs (behavioral health)   Laundry unable to complete   Room Organization independent   Activity   Activity Assistance Provided independent

## 2019-08-21 NOTE — PROGRESS NOTES
"Pt on SIO : 1-1 with the staff/writer, pt remained in room most part of the shift, pt calm/affect blunted flat/controlled behavior with no outburst, pt stated that \" my spirit is telling/guiding me/i'm good \" pt denies of mental health symptoms, pt appetite's good, this writer encouraged pt to walk/hygiene, pt needs are known/offered.     08/21/19 1300   Behavioral Health   Thoughts/Cognition (WDL) ex   Hallucinations denies / not responding to hallucinations   Thinking poor concentration   Orientation person: oriented;place: oriented   Memory baseline memory   Insight poor   Judgement impaired   Eye Contact at examiner   Affect/Mood (WDL) ex   Affect blunted, flat   Mood mood is calm   ADL Assessment (WDL) ex   Physical Appearance/Attire attire appropriate to age and situation   Hygiene pre-occupied with grooming   Suicidality (WDL) WDL   Suicidality other (see comments)  (none)   1. Wish to be Dead No   2. Non-Specific Active Suicidal Thoughts  No   Self Injury other (see comment)  (none)   Elopement Distress about legal situation (holds for mental health or criminal)   Activity (WDL) Ex   Activity isolative;withdrawn   Speech (WDL) WDL   Speech clear;coherent   Medication Sensitivity (WDL) WDL   Medication Sensitivity no stated side effects;no observed side effects   Psychomotor Gait (WDL) WDL   Psychomotor / Gait balanced;steady   Overt Agression (WDL) WDL   Activities of Daily Living   Hygiene/Grooming independent   Oral Hygiene independent   Dress scrubs (behavioral health);independent   Laundry unable to complete   Room Organization independent   Activity   Activity Assistance Provided independent   Hygiene Care Assistance   Hygiene Assistance independent     "

## 2019-08-22 LAB — GLUCOSE BLDC GLUCOMTR-MCNC: 132 MG/DL (ref 70–99)

## 2019-08-22 PROCEDURE — 25000132 ZZH RX MED GY IP 250 OP 250 PS 637: Performed by: PSYCHIATRY & NEUROLOGY

## 2019-08-22 PROCEDURE — 25000132 ZZH RX MED GY IP 250 OP 250 PS 637: Performed by: STUDENT IN AN ORGANIZED HEALTH CARE EDUCATION/TRAINING PROGRAM

## 2019-08-22 PROCEDURE — 00000146 ZZHCL STATISTIC GLUCOSE BY METER IP

## 2019-08-22 PROCEDURE — 12400001 ZZH R&B MH UMMC

## 2019-08-22 RX ADMIN — METFORMIN HYDROCHLORIDE 1000 MG: 500 TABLET, FILM COATED ORAL at 17:39

## 2019-08-22 RX ADMIN — ARIPIPRAZOLE 10 MG: 10 TABLET ORAL at 08:57

## 2019-08-22 RX ADMIN — THERA TABS 1 TABLET: TAB at 08:57

## 2019-08-22 RX ADMIN — CLOZAPINE 200 MG: 100 TABLET ORAL at 08:56

## 2019-08-22 RX ADMIN — DIVALPROEX SODIUM 1500 MG: 500 TABLET, FILM COATED, EXTENDED RELEASE ORAL at 08:57

## 2019-08-22 RX ADMIN — Medication 1 G: at 19:36

## 2019-08-22 RX ADMIN — MELATONIN 2000 UNITS: at 09:07

## 2019-08-22 RX ADMIN — PANTOPRAZOLE SODIUM 20 MG: 20 TABLET, DELAYED RELEASE ORAL at 08:57

## 2019-08-22 RX ADMIN — FENOFIBRATE 160 MG: 160 TABLET, FILM COATED ORAL at 19:35

## 2019-08-22 RX ADMIN — NIACIN 500 MG: 500 TABLET, FILM COATED, EXTENDED RELEASE ORAL at 19:36

## 2019-08-22 RX ADMIN — CLOZAPINE 450 MG: 100 TABLET ORAL at 19:35

## 2019-08-22 RX ADMIN — METFORMIN HYDROCHLORIDE 1000 MG: 500 TABLET, FILM COATED ORAL at 08:56

## 2019-08-22 RX ADMIN — DOCUSATE SODIUM 100 MG: 100 CAPSULE, LIQUID FILLED ORAL at 19:35

## 2019-08-22 RX ADMIN — DIVALPROEX SODIUM 1500 MG: 500 TABLET, FILM COATED, EXTENDED RELEASE ORAL at 19:35

## 2019-08-22 RX ADMIN — Medication 1 G: at 08:57

## 2019-08-22 RX ADMIN — DOCUSATE SODIUM 100 MG: 100 CAPSULE, LIQUID FILLED ORAL at 09:04

## 2019-08-22 RX ADMIN — POLYETHYLENE GLYCOL 3350 17 G: 17 POWDER, FOR SOLUTION ORAL at 08:57

## 2019-08-22 RX ADMIN — SIMVASTATIN 40 MG: 20 TABLET, FILM COATED ORAL at 19:36

## 2019-08-22 RX ADMIN — ESCITALOPRAM OXALATE 20 MG: 20 TABLET ORAL at 08:57

## 2019-08-22 RX ADMIN — PROPRANOLOL HYDROCHLORIDE 80 MG: 80 CAPSULE, EXTENDED RELEASE ORAL at 08:57

## 2019-08-22 ASSESSMENT — ACTIVITIES OF DAILY LIVING (ADL)
ORAL_HYGIENE: INDEPENDENT
HYGIENE/GROOMING: INDEPENDENT
DRESS: INDEPENDENT;SCRUBS (BEHAVIORAL HEALTH)

## 2019-08-22 NOTE — PROGRESS NOTES
[facility], [place]   Psychiatric Progress Note  Brayden Adrian  [id number]  08/21/19    Chief Complaint: Continued medical care          Interim History:   The patient's care was discussed with the treatment team during the daily team meeting and/or staff's chart notes were reviewed.  Staff report patient has been listening to himself in his room slept about 6 hours.    Reports that he is feeling good he does not have any current complaints or issues in the soreness he was experiencing has resolved.  Denies any thoughts of harming himself or others might have auditory hallucinations telling him about his spirits sending him to hell if he does not attach a prize to it.  Unclear if this is just storytelling or actual hallucinations no hopelessness or helplessness still has attention concentration and disorganized thoughts.  No sleep problems or paranoia about staff.  Recurrent guilty feelings or grandiose thoughts.  No hopelessness or helplessness and planning on discharging back to his group home once [facility] funding has been approved.         Medications:       ARIPiprazole  10 mg Oral Daily     cloZAPine  200 mg Oral QAM     cloZAPine  450 mg Oral At Bedtime     DIADERM FOOT REJUVENATING   Apply externally Daily     divalproex sodium extended-release  1,500 mg Oral BID     docusate sodium  100 mg Oral BID     escitalopram  20 mg Oral Daily     fenofibrate  160 mg Oral QPM     fish oil-omega-3 fatty acids  1 g Oral BID     medroxyPROGESTERone  300 mg Intramuscular Q7 Days     metFORMIN  1,000 mg Oral BID w/meals     multivitamin, therapeutic  1 tablet Oral Daily     niacin ER  500 mg Oral At Bedtime     pantoprazole  20 mg Oral Daily     polyethylene glycol  17 g Oral Daily     propranolol ER  80 mg Oral Daily     simvastatin  40 mg Oral At Bedtime     vitamin D3  2,000 Units Oral Daily          Allergies:     Allergies   Allergen Reactions     Haldol [Haloperidol] Other (See Comments)     " Increases pt's hallucinations           Labs:     Recent Results (from the past 24 hour(s))   Glucose by meter    Collection Time: 08/21/19  7:42 AM   Result Value Ref Range    Glucose 136 (H) 70 - 99 mg/dL   WBC and differential    Collection Time: 08/21/19  2:18 PM   Result Value Ref Range    WBC 9.5 4.0 - 11.0 10e9/L    Diff Method Automated Method     % Neutrophils 56.0 %    % Lymphocytes 33.1 %    % Monocytes 8.3 %    % Eosinophils 1.3 %    % Basophils 0.6 %    % Immature Granulocytes 0.7 %    Nucleated RBCs 0 0 /100    Absolute Neutrophil 5.3 1.6 - 8.3 10e9/L    Absolute Lymphocytes 3.1 0.8 - 5.3 10e9/L    Absolute Monocytes 0.8 0.0 - 1.3 10e9/L    Absolute Eosinophils 0.1 0.0 - 0.7 10e9/L    Absolute Basophils 0.1 0.0 - 0.2 10e9/L    Abs Immature Granulocytes 0.1 0 - 0.4 10e9/L    Absolute Nucleated RBC 0.0           Psychiatric Examination:     /74   Pulse 89   Temp 98  F (36.7  C) (Oral)   Resp 16   Ht 1.753 m (5' 9\")   Wt 101.6 kg (224 lb)   SpO2 99%   BMI 33.08 kg/m    Weight is 224 lbs 0 oz  Body mass index is 33.08 kg/m .  Lying Orthostatic BP: 125/70      Lying Orthostatic Pulse: 94 bpm      Sitting Orthostatic BP: 123/73      Sitting Orthostatic Pulse: 82 bpm      Standing Orthostatic BP: 121/78      Standing Orthostatic Pulse: 87 bpm       Weight over time:  Vitals:    06/26/19 1527 06/26/19 2102   Weight: 94.8 kg (209 lb) 101.6 kg (224 lb)       Orthostatic Vitals     None            Cardiometabolic risk assessment. 08/21/19      Reviewed patient profile for cardiometabolic risk factors    Date taken /Value  REFERENCE RANGE   Abdominal Obesity  (Waist Circumference)   See nursing flowsheet Women ?35 in (88 cm)   Men ?40 in (102 cm)      Triglycerides  No results found for: TRIG    ?150 mg/dL (1.7 mmol/L) or current treatment for elevated triglycerides   HDL cholesterol  No results found for: HDL]   Women <50 mg/dL (1.3 mmol/L) in women or current treatment for low HDL cholesterol  Men " <40 mg/dL (1 mmol/L) in men or current treatment for low HDL cholesterol     Fasting plasma glucose (FPG) Lab Results   Component Value Date     06/26/2019      FPG ?100 mg/dL (5.6 mmol/L) or treatment for elevated blood glucose   Blood pressure  BP Readings from Last 3 Encounters:   08/21/19 116/74   04/02/19 110/73    Blood pressure ?130/85 mmHg or treatment for elevated blood pressure   Family History  See family history         Brayden is a 54-year-old male with white hair and eyeglasses and is overweight.  His speech is simplistic and of an appropriate rate and tone.  His behavior is appropriate and he does not appear to have any abnormal movements.  His affect is neutral.  His mood he describes as ok.  His thought content consists of the above without thoughts of harming himself or others possible delusions at times.  His thought process is concrete with sometimes experiencing looseness of association.  He may have abnormal perceptions at times.  He is alert but not aware of current circumstances completely.  His attention and concentration is limited to impaired.  His long-term/short-term/remote memory is limited to impaired.  His insight and judgment are both impaired.            Precautions:     Behavioral Orders   Procedures     Assault precautions     Code 1 - Restrict to Unit     Routine Programming     As clinically indicated     Sexual precautions     Status 15     Every 15 minutes.     Status Individual Observation     Male only.  10 feet space restriction from peers and staff. Staff to stay 10 ft when patient in hallway. OK to sit at door when patient in room.     Order Specific Question:   CONTINUOUS 24 hours / day     Answer:   Other     Order Specific Question:   Specify distance     Answer:   Male only.  10 feet space restriction from peers and staff. Staff to stay 10 ft when patient in hallway. OK to sit at door when patient in room.     Order Specific Question:   Indications for SIO      Answer:   Assault risk     Order Specific Question:   Indications for SIO     Answer:   Severe intrusiveness          DIagnoses:     Schizoaffective disorder bipolar type  Mild intellectual disability  History of Tardive Dyskinesia  History of TBI  Polysubstance use disorder, in remission            Assessment & Plan:     Brayden continues to be at his baseline of function which is disorganized with attention and concentration deficits and possible auditory hallucinations.  He also at baseline has redirectable lunging and grabbing behaviors at others.  When not at baseline he will be highly assaultive and not redirectable and much more disorganized and psychotic.  He is currently awaiting [employer] funding during his hospitalization his divalproex was increased and his Depo-Provera was also increased for sexual aggression.  He is only waiting [employer] funding and can go back to his group home.    Continue voluntary by guardian awaiting [employer] funding       The risks, benefits, alternatives and side effects have been discussed and are understood by the patient and other caregivers.      Cayetano Mosher MD  [facility]      The following document has been created with voice recognition software and may contain unintentional word substitutions.    Non clinically relevant CMS requirements:  Clinical Global Impressions  First:  Considering your total clinical experience with this particular patient population, how severe are the patient's symptoms at this time?: 6 (06/28/19 2011)  Compared to the patient's condition at the START of treatment, this patient's condition is:: 4 (06/28/19 2011)  Most recent:  Considering your total clinical experience with this particular patient population, how severe are the patient's symptoms at this time?: 2 (08/20/19 1550)  Compared to the patient's condition at the START of treatment, this patient's condition is:: 2 (08/20/19 1550)

## 2019-08-22 NOTE — PLAN OF CARE
No change in presentation. He was calm and pleasant. He spent some time out of his room this evening without any agitation.  Pt appeared to be at his baseline and patiently waiting for [facility] funding to be approved before discharging to his group home. He remains medication compliant and denied SE's. No agitation or hypersexual behaviors this evening. No SI/SIB.

## 2019-08-22 NOTE — PROGRESS NOTES
08/22/19 1500   Behavioral Health   Hallucinations denies / not responding to hallucinations   Thinking poor concentration   Orientation person: oriented;place: oriented;date: oriented;time: oriented   Memory baseline memory   Insight poor   Judgement impaired   Eye Contact at examiner   Affect blunted, flat   Mood mood is calm   Physical Appearance/Attire neat   Hygiene well groomed   Suicidality   (denies)   1. Wish to be Dead No   2. Non-Specific Active Suicidal Thoughts  No   3. Active Sucidal Ideation with any Methods (Not Plan) Without Intent to Act  No   4. Active Suicidal Ideation with Some Intent to Act, Without Specific Plan  No   5. Active Suicidal Ideation with Specific Plan and Intent  No   Psycho Education   Type of Intervention 1:1 intervention   Response participates, initiates socially appropriate   Hours 0.5   Treatment Detail check in with pt     Pt napped or was in bed awake 90% of the day even with multiple suggestions to exercise in his room or to plan with staff when he can come out to exercise. PT was able to shower and change sheets with no issues. Pt denies any SI/SIB.

## 2019-08-23 LAB — GLUCOSE BLDC GLUCOMTR-MCNC: 130 MG/DL (ref 70–99)

## 2019-08-23 PROCEDURE — 25000132 ZZH RX MED GY IP 250 OP 250 PS 637: Performed by: STUDENT IN AN ORGANIZED HEALTH CARE EDUCATION/TRAINING PROGRAM

## 2019-08-23 PROCEDURE — 12400001 ZZH R&B MH UMMC

## 2019-08-23 PROCEDURE — 00000146 ZZHCL STATISTIC GLUCOSE BY METER IP

## 2019-08-23 PROCEDURE — 25000132 ZZH RX MED GY IP 250 OP 250 PS 637: Performed by: PSYCHIATRY & NEUROLOGY

## 2019-08-23 PROCEDURE — 99232 SBSQ HOSP IP/OBS MODERATE 35: CPT | Performed by: PSYCHIATRY & NEUROLOGY

## 2019-08-23 PROCEDURE — 25000128 H RX IP 250 OP 636: Performed by: STUDENT IN AN ORGANIZED HEALTH CARE EDUCATION/TRAINING PROGRAM

## 2019-08-23 RX ADMIN — MEDROXYPROGESTERONE ACETATE 300 MG: 150 INJECTION, SUSPENSION INTRAMUSCULAR at 12:55

## 2019-08-23 RX ADMIN — PANTOPRAZOLE SODIUM 20 MG: 20 TABLET, DELAYED RELEASE ORAL at 08:39

## 2019-08-23 RX ADMIN — DOCUSATE SODIUM 100 MG: 100 CAPSULE, LIQUID FILLED ORAL at 08:38

## 2019-08-23 RX ADMIN — CLOZAPINE 200 MG: 100 TABLET ORAL at 08:37

## 2019-08-23 RX ADMIN — PROPRANOLOL HYDROCHLORIDE 80 MG: 80 CAPSULE, EXTENDED RELEASE ORAL at 08:39

## 2019-08-23 RX ADMIN — ESCITALOPRAM OXALATE 20 MG: 20 TABLET ORAL at 08:38

## 2019-08-23 RX ADMIN — THERA TABS 1 TABLET: TAB at 08:39

## 2019-08-23 RX ADMIN — SIMVASTATIN 40 MG: 20 TABLET, FILM COATED ORAL at 19:05

## 2019-08-23 RX ADMIN — NIACIN 500 MG: 500 TABLET, FILM COATED, EXTENDED RELEASE ORAL at 19:05

## 2019-08-23 RX ADMIN — ARIPIPRAZOLE 10 MG: 10 TABLET ORAL at 08:37

## 2019-08-23 RX ADMIN — Medication 1 G: at 08:38

## 2019-08-23 RX ADMIN — POLYETHYLENE GLYCOL 3350 17 G: 17 POWDER, FOR SOLUTION ORAL at 08:39

## 2019-08-23 RX ADMIN — MELATONIN 2000 UNITS: at 08:39

## 2019-08-23 RX ADMIN — DIVALPROEX SODIUM 1500 MG: 500 TABLET, FILM COATED, EXTENDED RELEASE ORAL at 08:38

## 2019-08-23 RX ADMIN — METFORMIN HYDROCHLORIDE 1000 MG: 500 TABLET, FILM COATED ORAL at 08:38

## 2019-08-23 RX ADMIN — ACETAMINOPHEN 650 MG: 325 TABLET, FILM COATED ORAL at 19:05

## 2019-08-23 RX ADMIN — FENOFIBRATE 160 MG: 160 TABLET, FILM COATED ORAL at 19:05

## 2019-08-23 RX ADMIN — METFORMIN HYDROCHLORIDE 1000 MG: 500 TABLET, FILM COATED ORAL at 17:31

## 2019-08-23 RX ADMIN — DIVALPROEX SODIUM 1500 MG: 500 TABLET, FILM COATED, EXTENDED RELEASE ORAL at 19:05

## 2019-08-23 RX ADMIN — CLOZAPINE 450 MG: 100 TABLET ORAL at 19:04

## 2019-08-23 RX ADMIN — Medication 1 G: at 19:05

## 2019-08-23 RX ADMIN — DOCUSATE SODIUM 100 MG: 100 CAPSULE, LIQUID FILLED ORAL at 19:05

## 2019-08-23 ASSESSMENT — ACTIVITIES OF DAILY LIVING (ADL)
ORAL_HYGIENE: PROMPTS;INDEPENDENT
DRESS: STREET CLOTHES
LAUNDRY: WITH SUPERVISION
DRESS: PROMPTS;INDEPENDENT
HYGIENE/GROOMING: INDEPENDENT
LAUNDRY: UNABLE TO COMPLETE
ORAL_HYGIENE: INDEPENDENT
HYGIENE/GROOMING: INDEPENDENT;PROMPTS

## 2019-08-23 NOTE — PLAN OF CARE
Patient pleasant and cooperative, continue to be on SIO.  Flat affect, denies SI/SIB.  Took scheduled medications with no problem.  Patient had large loose BM accident this shift.  Patient showered, room cleaned.  Vitals done to r/o shigella infection.  Vital signs stable; no aggressive behavior noted.  Patient resting comfortably in room.  Will continue to monitor closely.

## 2019-08-23 NOTE — PROGRESS NOTES
Pt presents blunted and calm. No aggression or outbursts. Med compliant. Ate meals.       08/22/19 2000   Behavioral Health   Thinking distractable;poor concentration   Orientation person: oriented;place: oriented   Insight poor   Judgement impaired   Affect blunted, flat   Mood mood is calm   Physical Appearance/Attire attire appropriate to age and situation   Hygiene other (see comment)  (adequate)   1. Wish to be Dead No   2. Non-Specific Active Suicidal Thoughts  No   Activity withdrawn;isolative   Speech clear   Psychomotor / Gait balanced;steady   Activities of Daily Living   Hygiene/Grooming independent   Oral Hygiene independent   Dress independent;scrubs (behavioral health)   Room Organization independent

## 2019-08-23 NOTE — PLAN OF CARE
BEHAVIORAL TEAM DISCUSSION    Participants:chino floyd rn, christi coleman [facility]  Progress: Pt is believed to be a baseline functioning.   He last lunged at staff on 8-20.   It was very brief, no contact made, and pt was very directable.  He is generally isolative, very sedentary, calm, cooperative, pleasant, med compliant.   More organized and less aggressive and less hypersexual.  Anticipated length of stay: discharge when [employer] funding goes through  Continued Stay Criteria/Rationale: as above  Medical/Physical: no new issues  Precautions:   Behavioral Orders   Procedures    Assault precautions    Code 1 - Restrict to Unit    Routine Programming     As clinically indicated    Sexual precautions    Status 15     Every 15 minutes.    Status Individual Observation     Male only.  10 feet space restriction from peers and staff. Staff to stay 10 ft when patient in hallway. OK to sit at door when patient in room.     Order Specific Question:   CONTINUOUS 24 hours / day     Answer:   Other     Order Specific Question:   Specify distance     Answer:   Male only.  10 feet space restriction from peers and staff. Staff to stay 10 ft when patient in hallway. OK to sit at door when patient in room.     Order Specific Question:   Indications for SIO     Answer:   Assault risk     Order Specific Question:   Indications for SIO     Answer:   Severe intrusiveness     Plan:    meds per dr bonilla and dr del rio.    Pt will return to his group home when the [facility] funding is approved.  He had the [facility] screening on 8-13.  His father/guardian and group home staff were present for that.   I have contacted the [facility] screener and group home staff.   I am told the [facility] referral is in process.     Rationale for change in precautions or plan: no change at this time.

## 2019-08-23 NOTE — PROGRESS NOTES
"[facility], [place]   Psychiatric Progress Note        Interim History:   The patient's care was discussed with the treatment team during the daily team meeting and/or staff's chart notes were reviewed.  Staff report patient has been doing well. Did have diarrhea this morning.     The patient patient reports that his mood is \"half and half.\" When asked about his sleep, says that he was \"unconscious.\" Does endorse SI but reports feeling safe here. Eating well. Says that he has \"a little bit\" of thoughts to harm others but no urges.          Medications:       ARIPiprazole  10 mg Oral Daily     cloZAPine  200 mg Oral QAM     cloZAPine  450 mg Oral At Bedtime     DIADERM FOOT REJUVENATING   Apply externally Daily     divalproex sodium extended-release  1,500 mg Oral BID     docusate sodium  100 mg Oral BID     escitalopram  20 mg Oral Daily     fenofibrate  160 mg Oral QPM     fish oil-omega-3 fatty acids  1 g Oral BID     medroxyPROGESTERone  300 mg Intramuscular Q7 Days     metFORMIN  1,000 mg Oral BID w/meals     multivitamin, therapeutic  1 tablet Oral Daily     niacin ER  500 mg Oral At Bedtime     pantoprazole  20 mg Oral Daily     polyethylene glycol  17 g Oral Daily     propranolol ER  80 mg Oral Daily     simvastatin  40 mg Oral At Bedtime     vitamin D3  2,000 Units Oral Daily          Allergies:     Allergies   Allergen Reactions     Haldol [Haloperidol] Other (See Comments)     Increases pt's hallucinations           Labs:     Recent Results (from the past 24 hour(s))   Glucose by meter    Collection Time: 08/23/19  8:25 AM   Result Value Ref Range    Glucose 130 (H) 70 - 99 mg/dL          Psychiatric Examination:     /67   Pulse 81   Temp 99.1  F (37.3  C) (Oral)   Resp 16   Ht 1.753 m (5' 9\")   Wt 101.6 kg (224 lb)   SpO2 100%   BMI 33.08 kg/m    Weight is 224 lbs 0 oz  Body mass index is 33.08 kg/m .  Orthostatic Vitals       Most Recent      Lying Orthostatic BP " "129/70 08/23 0821    Lying Orthostatic Pulse (bpm) 87 08/23 0821    Sitting Orthostatic /67 08/23 1336    Sitting Orthostatic Pulse (bpm) 81 08/23 1336            Appearance: awake, alert and adequately groomed  Attitude:  somewhat cooperative  Eye Contact:  fair  Mood:  \"half and half\"  Affect:  intensity is blunted  Speech:  paucity of speech  Psychomotor Behavior:  no evidence of tardive dyskinesia, dystonia, or tics  Thought Process:  concrete  Associations:  no loose associations  Thought Content:  passive suicidal ideation present and some thoughts to harm others  Insight:  partial  Judgement:  fair  Oriented to:  time, person, and place  Attention Span and Concentration:  fair  Recent and Remote Memory:  fair    Clinical Global Impressions  First:  Considering your total clinical experience with this particular patient population, how severe are the patient's symptoms at this time?: 6 (06/28/19 2011)  Compared to the patient's condition at the START of treatment, this patient's condition is:: 4 (06/28/19 2011)  Most recent:  Considering your total clinical experience with this particular patient population, how severe are the patient's symptoms at this time?: 2 (08/20/19 1550)  Compared to the patient's condition at the START of treatment, this patient's condition is:: 2 (08/20/19 1550)         Precautions:     Behavioral Orders   Procedures     Assault precautions     Code 1 - Restrict to Unit     Routine Programming     As clinically indicated     Sexual precautions     Status 15     Every 15 minutes.     Status Individual Observation     Male only.  10 feet space restriction from peers and staff. Staff to stay 10 ft when patient in hallway. OK to sit at door when patient in room.     Order Specific Question:   CONTINUOUS 24 hours / day     Answer:   Other     Order Specific Question:   Specify distance     Answer:   Male only.  10 feet space restriction from peers and staff. Staff to stay 10 ft when " patient in hallway. OK to sit at door when patient in room.     Order Specific Question:   Indications for SIO     Answer:   Assault risk     Order Specific Question:   Indications for SIO     Answer:   Severe intrusiveness          Diagnoses:     Schizoaffective disorder bipolar type  Mild intellectual disability  History of Tardive Dyskinesia  History of TBI  Polysubstance use disorder, in remission         Plan:     1) Continue medications as above.   2) Discharge to group home soon.   3) Continue SIO.       Disposition Plan   Reason for ongoing admission: poses an imminent risk to self  Discharge location: group home  Discharge Medications: not ordered  Follow-up Appointments: not scheduled  Legal Status: voluntary  Entered by: Kd Maya on 8/23/2019 at 4:11 PM

## 2019-08-24 LAB — GLUCOSE BLDC GLUCOMTR-MCNC: 123 MG/DL (ref 70–99)

## 2019-08-24 PROCEDURE — 25000132 ZZH RX MED GY IP 250 OP 250 PS 637: Performed by: STUDENT IN AN ORGANIZED HEALTH CARE EDUCATION/TRAINING PROGRAM

## 2019-08-24 PROCEDURE — 25000132 ZZH RX MED GY IP 250 OP 250 PS 637: Performed by: PSYCHIATRY & NEUROLOGY

## 2019-08-24 PROCEDURE — 00000146 ZZHCL STATISTIC GLUCOSE BY METER IP

## 2019-08-24 PROCEDURE — 12400001 ZZH R&B MH UMMC

## 2019-08-24 RX ADMIN — ACETAMINOPHEN 650 MG: 325 TABLET, FILM COATED ORAL at 19:01

## 2019-08-24 RX ADMIN — THERA TABS 1 TABLET: TAB at 08:21

## 2019-08-24 RX ADMIN — PROPRANOLOL HYDROCHLORIDE 80 MG: 80 CAPSULE, EXTENDED RELEASE ORAL at 08:21

## 2019-08-24 RX ADMIN — METFORMIN HYDROCHLORIDE 1000 MG: 500 TABLET, FILM COATED ORAL at 17:11

## 2019-08-24 RX ADMIN — ACETAMINOPHEN 650 MG: 325 TABLET, FILM COATED ORAL at 08:29

## 2019-08-24 RX ADMIN — DOCUSATE SODIUM 100 MG: 100 CAPSULE, LIQUID FILLED ORAL at 19:00

## 2019-08-24 RX ADMIN — OLANZAPINE 10 MG: 10 TABLET, FILM COATED ORAL at 16:49

## 2019-08-24 RX ADMIN — Medication 1 G: at 08:21

## 2019-08-24 RX ADMIN — ARIPIPRAZOLE 10 MG: 10 TABLET ORAL at 08:22

## 2019-08-24 RX ADMIN — ESCITALOPRAM OXALATE 20 MG: 20 TABLET ORAL at 08:21

## 2019-08-24 RX ADMIN — CLOZAPINE 450 MG: 100 TABLET ORAL at 19:00

## 2019-08-24 RX ADMIN — CLOZAPINE 200 MG: 100 TABLET ORAL at 08:22

## 2019-08-24 RX ADMIN — MELATONIN 2000 UNITS: at 08:21

## 2019-08-24 RX ADMIN — DIVALPROEX SODIUM 1500 MG: 500 TABLET, FILM COATED, EXTENDED RELEASE ORAL at 19:01

## 2019-08-24 RX ADMIN — NIACIN 500 MG: 500 TABLET, FILM COATED, EXTENDED RELEASE ORAL at 19:01

## 2019-08-24 RX ADMIN — Medication 1 G: at 19:01

## 2019-08-24 RX ADMIN — FENOFIBRATE 160 MG: 160 TABLET, FILM COATED ORAL at 19:00

## 2019-08-24 RX ADMIN — DIVALPROEX SODIUM 1500 MG: 500 TABLET, FILM COATED, EXTENDED RELEASE ORAL at 08:21

## 2019-08-24 RX ADMIN — METFORMIN HYDROCHLORIDE 1000 MG: 500 TABLET, FILM COATED ORAL at 08:21

## 2019-08-24 RX ADMIN — PANTOPRAZOLE SODIUM 20 MG: 20 TABLET, DELAYED RELEASE ORAL at 08:21

## 2019-08-24 RX ADMIN — SIMVASTATIN 40 MG: 20 TABLET, FILM COATED ORAL at 19:00

## 2019-08-24 ASSESSMENT — ACTIVITIES OF DAILY LIVING (ADL)
HYGIENE/GROOMING: INDEPENDENT
LAUNDRY: UNABLE TO COMPLETE
ORAL_HYGIENE: PROMPTS;INDEPENDENT
DRESS: PROMPTS;INDEPENDENT

## 2019-08-24 NOTE — PLAN OF CARE
"Patient remains on SIO 1:1 for safety. Had episode of loose stool during days and temperature of 99.1 on day shift, VS WNL this evening with temperature of 98.7 and no bowel movements or episodes of loose stool.     Patient remains isolated to room, self. Disheveled, fair hygiene, showered on day shift. Blunted affect. Delusional, when writer asked if he had any episodes of loose stool this evening he reported, \"I think I have my period.\" Patient went on to say, \"When I sat down on the toilet it felt like water came out.\" Calm and cooperative, no incidents of lunging at staff or aggression. Medication complaint. Will continue to monitor for safety.   "

## 2019-08-25 LAB — GLUCOSE BLDC GLUCOMTR-MCNC: 108 MG/DL (ref 70–99)

## 2019-08-25 PROCEDURE — 00000146 ZZHCL STATISTIC GLUCOSE BY METER IP

## 2019-08-25 PROCEDURE — 12400001 ZZH R&B MH UMMC

## 2019-08-25 PROCEDURE — 25000132 ZZH RX MED GY IP 250 OP 250 PS 637: Performed by: PSYCHIATRY & NEUROLOGY

## 2019-08-25 PROCEDURE — 25000132 ZZH RX MED GY IP 250 OP 250 PS 637: Performed by: STUDENT IN AN ORGANIZED HEALTH CARE EDUCATION/TRAINING PROGRAM

## 2019-08-25 RX ADMIN — METFORMIN HYDROCHLORIDE 1000 MG: 500 TABLET, FILM COATED ORAL at 08:34

## 2019-08-25 RX ADMIN — ESCITALOPRAM OXALATE 20 MG: 20 TABLET ORAL at 08:34

## 2019-08-25 RX ADMIN — CLOZAPINE 200 MG: 100 TABLET ORAL at 08:34

## 2019-08-25 RX ADMIN — POLYETHYLENE GLYCOL 3350 17 G: 17 POWDER, FOR SOLUTION ORAL at 08:30

## 2019-08-25 RX ADMIN — DIVALPROEX SODIUM 1500 MG: 500 TABLET, FILM COATED, EXTENDED RELEASE ORAL at 19:01

## 2019-08-25 RX ADMIN — Medication 1 G: at 19:01

## 2019-08-25 RX ADMIN — MELATONIN 2000 UNITS: at 08:35

## 2019-08-25 RX ADMIN — Medication 1 G: at 08:34

## 2019-08-25 RX ADMIN — DOCUSATE SODIUM 100 MG: 100 CAPSULE, LIQUID FILLED ORAL at 08:35

## 2019-08-25 RX ADMIN — SIMVASTATIN 40 MG: 20 TABLET, FILM COATED ORAL at 19:01

## 2019-08-25 RX ADMIN — DIVALPROEX SODIUM 1500 MG: 500 TABLET, FILM COATED, EXTENDED RELEASE ORAL at 08:34

## 2019-08-25 RX ADMIN — FENOFIBRATE 160 MG: 160 TABLET, FILM COATED ORAL at 19:01

## 2019-08-25 RX ADMIN — PROPRANOLOL HYDROCHLORIDE 80 MG: 80 CAPSULE, EXTENDED RELEASE ORAL at 08:35

## 2019-08-25 RX ADMIN — CLOZAPINE 450 MG: 100 TABLET ORAL at 19:00

## 2019-08-25 RX ADMIN — THERA TABS 1 TABLET: TAB at 08:35

## 2019-08-25 RX ADMIN — NIACIN 500 MG: 500 TABLET, FILM COATED, EXTENDED RELEASE ORAL at 19:01

## 2019-08-25 RX ADMIN — DOCUSATE SODIUM 100 MG: 100 CAPSULE, LIQUID FILLED ORAL at 19:01

## 2019-08-25 RX ADMIN — METFORMIN HYDROCHLORIDE 1000 MG: 500 TABLET, FILM COATED ORAL at 17:03

## 2019-08-25 RX ADMIN — PANTOPRAZOLE SODIUM 20 MG: 20 TABLET, DELAYED RELEASE ORAL at 08:35

## 2019-08-25 RX ADMIN — ARIPIPRAZOLE 10 MG: 10 TABLET ORAL at 08:34

## 2019-08-25 ASSESSMENT — ACTIVITIES OF DAILY LIVING (ADL)
DRESS: SCRUBS (BEHAVIORAL HEALTH);INDEPENDENT
HYGIENE/GROOMING: INDEPENDENT
ORAL_HYGIENE: INDEPENDENT

## 2019-08-25 NOTE — PLAN OF CARE
"Patient presents as quiet, guarded, and socially withdrawn with a blunted affect.  He continues on SIO and a 10 foot space restriction from others to manage his intrusive and aggressive behaviors that can be both impulsive and hard to predict.  He has had an uneventful shift thus far, and he appears to be at or near his baseline level of functioning.  He did not reply when asked about psychotic symptoms today, but he does appear easily distracted during our interactions and is likely RIS.  In addition, his responses to most interview questions are significantly delayed, and he may be experiencing some thought blocking.  Brayden's personal hygiene maintenance remains quite poor.  When asked how we could make him feel more comfortable on the unit, he responded: \"Don't make me shower!\".  He is malodorous, untidy, and disheveled.  He has been isolative to his room for the majority of this shift, and he has declined multiple offers to engage in activities in the therapeutic milieu and/ or get some exercise this shift.  He has been compliant with all of his scheduled medications, and no adverse side effects have been reported or observed.  He denies any acute physical concerns.  VSS.  "

## 2019-08-25 NOTE — PLAN OF CARE
"Patient remains on SIO 1:1 for safety. Remains isolated to room, withdrawn. Disheveled, untidy appearance. Blunted affect, appears calm. Reports \"visions and paranoia,\" and \"I thought someone was standing over there and staring at me,\" and points behind him to the wall. Writer offered and gave PRN Zyprexa, patient reported later it did not help. Patient spent much of the evening laying on bed staring out door or staring into space, not as much TV watching as usual. Poor insight, judgement. Medication compliant. No episodes of aggression this shift. Will continue to monitor for safety.   "

## 2019-08-25 NOTE — PROGRESS NOTES
Patient slept for 4 hours this shift. Patient remains on SIO as ordered for safety. No complaints or concerns voiced by patient or noted by staff. Will continue to monitor and update if there are changes.

## 2019-08-26 LAB — GLUCOSE BLDC GLUCOMTR-MCNC: 119 MG/DL (ref 70–99)

## 2019-08-26 PROCEDURE — 25000132 ZZH RX MED GY IP 250 OP 250 PS 637: Performed by: PSYCHIATRY & NEUROLOGY

## 2019-08-26 PROCEDURE — 12400001 ZZH R&B MH UMMC

## 2019-08-26 PROCEDURE — 25000132 ZZH RX MED GY IP 250 OP 250 PS 637: Performed by: STUDENT IN AN ORGANIZED HEALTH CARE EDUCATION/TRAINING PROGRAM

## 2019-08-26 PROCEDURE — 99232 SBSQ HOSP IP/OBS MODERATE 35: CPT | Performed by: PSYCHIATRY & NEUROLOGY

## 2019-08-26 PROCEDURE — 00000146 ZZHCL STATISTIC GLUCOSE BY METER IP

## 2019-08-26 RX ADMIN — Medication 1 G: at 20:53

## 2019-08-26 RX ADMIN — PROPRANOLOL HYDROCHLORIDE 80 MG: 80 CAPSULE, EXTENDED RELEASE ORAL at 09:33

## 2019-08-26 RX ADMIN — PANTOPRAZOLE SODIUM 20 MG: 20 TABLET, DELAYED RELEASE ORAL at 09:33

## 2019-08-26 RX ADMIN — DIVALPROEX SODIUM 1500 MG: 500 TABLET, FILM COATED, EXTENDED RELEASE ORAL at 20:54

## 2019-08-26 RX ADMIN — POLYETHYLENE GLYCOL 3350 17 G: 17 POWDER, FOR SOLUTION ORAL at 09:33

## 2019-08-26 RX ADMIN — THERA TABS 1 TABLET: TAB at 09:33

## 2019-08-26 RX ADMIN — ESCITALOPRAM OXALATE 20 MG: 20 TABLET ORAL at 09:34

## 2019-08-26 RX ADMIN — DIVALPROEX SODIUM 1500 MG: 500 TABLET, FILM COATED, EXTENDED RELEASE ORAL at 09:34

## 2019-08-26 RX ADMIN — NIACIN 500 MG: 500 TABLET, FILM COATED, EXTENDED RELEASE ORAL at 20:54

## 2019-08-26 RX ADMIN — METFORMIN HYDROCHLORIDE 1000 MG: 500 TABLET, FILM COATED ORAL at 09:34

## 2019-08-26 RX ADMIN — METFORMIN HYDROCHLORIDE 1000 MG: 500 TABLET, FILM COATED ORAL at 20:54

## 2019-08-26 RX ADMIN — FENOFIBRATE 160 MG: 160 TABLET, FILM COATED ORAL at 20:54

## 2019-08-26 RX ADMIN — ARIPIPRAZOLE 10 MG: 10 TABLET ORAL at 09:34

## 2019-08-26 RX ADMIN — MELATONIN 2000 UNITS: at 09:34

## 2019-08-26 RX ADMIN — SIMVASTATIN 40 MG: 20 TABLET, FILM COATED ORAL at 20:54

## 2019-08-26 RX ADMIN — DOCUSATE SODIUM 100 MG: 100 CAPSULE, LIQUID FILLED ORAL at 20:54

## 2019-08-26 RX ADMIN — DOCUSATE SODIUM 100 MG: 100 CAPSULE, LIQUID FILLED ORAL at 09:35

## 2019-08-26 RX ADMIN — Medication 1 G: at 09:34

## 2019-08-26 RX ADMIN — CLOZAPINE 450 MG: 100 TABLET ORAL at 20:53

## 2019-08-26 RX ADMIN — CLOZAPINE 200 MG: 100 TABLET ORAL at 09:34

## 2019-08-26 ASSESSMENT — ACTIVITIES OF DAILY LIVING (ADL)
DRESS: SCRUBS (BEHAVIORAL HEALTH)
ORAL_HYGIENE: PROMPTS
ORAL_HYGIENE: PROMPTS
LAUNDRY: UNABLE TO COMPLETE
HYGIENE/GROOMING: PROMPTS
LAUNDRY: UNABLE TO COMPLETE
HYGIENE/GROOMING: PROMPTS
DRESS: SCRUBS (BEHAVIORAL HEALTH)

## 2019-08-26 NOTE — PROGRESS NOTES
08/25/19 2123   Behavioral Health   Hallucinations appears responding   Thinking distractable;confused;delusional   Orientation place: oriented;person: oriented   Memory baseline memory   Insight poor   Judgement impaired   Eye Contact at examiner   Affect blunted, flat   Mood mood is calm   Physical Appearance/Attire untidy;disheveled   Hygiene neglected grooming - unclean body, hair, teeth   Suicidality other (see comments)  (BRO)   1. Wish to be Dead No   2. Non-Specific Active Suicidal Thoughts  No   Self Injury other (see comment)  (BRO)   Activity isolative;withdrawn  (Space Restriction)   Speech pressured;circumstantial   Medication Sensitivity no observed side effects;no stated side effects   Psychomotor / Gait balanced;steady     Pt. Had a standard shift with no incidents.  Pt. Needs prompting for Hygiene. Pt. Was prompted to ride the bike for physical stimulation. Pt. Is still asking for permission for simple tasks such as using the rest room. No SI/SIB. Pt. Appears to be responding. Blunt affect through shift. Pt. Had Boughts of staring at staff for extended periods of time. Will continue to observe.

## 2019-08-26 NOTE — PROGRESS NOTES
"[facility], [place]   Psychiatric Progress Note        Interim History:   The patient's care was discussed with the treatment team during the daily team meeting and/or staff's chart notes were reviewed.  Staff report patient has been doing well. Does not leave his room or bed often.     The patient patient reports that he is doing okay. Mood is \"okay.\" Denies SI but says that he \"pounded myself in the nose last night.\" No current urges to self harm. Sleeping and eating well. When asked about AH, says that he has been \"listening to staff.\"          Medications:       ARIPiprazole  10 mg Oral Daily     cloZAPine  200 mg Oral QAM     cloZAPine  450 mg Oral At Bedtime     DIADERM FOOT REJUVENATING   Apply externally Daily     divalproex sodium extended-release  1,500 mg Oral BID     docusate sodium  100 mg Oral BID     escitalopram  20 mg Oral Daily     fenofibrate  160 mg Oral QPM     fish oil-omega-3 fatty acids  1 g Oral BID     medroxyPROGESTERone  300 mg Intramuscular Q7 Days     metFORMIN  1,000 mg Oral BID w/meals     multivitamin, therapeutic  1 tablet Oral Daily     niacin ER  500 mg Oral At Bedtime     pantoprazole  20 mg Oral Daily     polyethylene glycol  17 g Oral Daily     propranolol ER  80 mg Oral Daily     simvastatin  40 mg Oral At Bedtime     vitamin D3  2,000 Units Oral Daily          Allergies:     Allergies   Allergen Reactions     Haldol [Haloperidol] Other (See Comments)     Increases pt's hallucinations           Labs:     Recent Results (from the past 24 hour(s))   Glucose by meter    Collection Time: 08/25/19  8:05 AM   Result Value Ref Range    Glucose 108 (H) 70 - 99 mg/dL          Psychiatric Examination:     /65   Pulse 84   Temp 99  F (37.2  C) (Tympanic)   Resp 16   Ht 1.753 m (5' 9\")   Wt 101.6 kg (224 lb)   SpO2 98%   BMI 33.08 kg/m    Weight is 224 lbs 0 oz  Body mass index is 33.08 kg/m .  Orthostatic Vitals       Most Recent      Lying " "Orthostatic /70 08/23 0821    Lying Orthostatic Pulse (bpm) 87 08/23 0821    Sitting Orthostatic /67 08/23 1336    Sitting Orthostatic Pulse (bpm) 81 08/23 1336            Appearance: awake, alert and adequately groomed  Attitude:  more cooperative  Eye Contact:  fair  Mood:  \"okay\"  Affect:  intensity is blunted  Speech:  paucity of speech  Psychomotor Behavior:  no evidence of tardive dyskinesia, dystonia, or tics  Thought Process:  concrete  Associations:  no loose associations  Thought Content:  auditory hallucinations present  Insight:  partial  Judgement:  fair  Oriented to:  time, person, and place  Attention Span and Concentration:  fair  Recent and Remote Memory:  fair    Clinical Global Impressions  First:  Considering your total clinical experience with this particular patient population, how severe are the patient's symptoms at this time?: 6 (06/28/19 2011)  Compared to the patient's condition at the START of treatment, this patient's condition is:: 4 (06/28/19 2011)  Most recent:  Considering your total clinical experience with this particular patient population, how severe are the patient's symptoms at this time?: 2 (08/20/19 1550)  Compared to the patient's condition at the START of treatment, this patient's condition is:: 2 (08/20/19 1550)         Precautions:     Behavioral Orders   Procedures     Assault precautions     Code 1 - Restrict to Unit     Routine Programming     As clinically indicated     Sexual precautions     Status 15     Every 15 minutes.     Status Individual Observation     Male only.  10 feet space restriction from peers and staff. Staff to stay 10 ft when patient in hallway. OK to sit at door when patient in room.     Order Specific Question:   CONTINUOUS 24 hours / day     Answer:   Other     Order Specific Question:   Specify distance     Answer:   Male only.  10 feet space restriction from peers and staff. Staff to stay 10 ft when patient in hallway. OK to sit at " door when patient in room.     Order Specific Question:   Indications for SIO     Answer:   Assault risk     Order Specific Question:   Indications for SIO     Answer:   Severe intrusiveness          Diagnoses:     Schizoaffective disorder bipolar type  Mild intellectual disability  History of Tardive Dyskinesia  History of TBI  Polysubstance use disorder, in remission         Plan:     1) Continue medications as above.   2) Discharge to group home soon.   3) Continue SIO.       Disposition Plan   Reason for ongoing admission: poses an imminent risk to self  Discharge location: group home  Discharge Medications: not ordered  Follow-up Appointments: not scheduled  Legal Status: voluntary  Entered by: Kd Maya on 8/26/2019 at 8:01 AM

## 2019-08-26 NOTE — PROGRESS NOTES
No real change in patients behavior other than patient no longer charges at staff. Patient did shower today on his own volition.     08/26/19 1417   Sleep/Rest/Relaxation   Day/Evening Time Hours napping;resting in bed   Number of hours napping 2 hours   Number of hours resting in bed 5 hours   Behavioral Health   Hallucinations other (see comment)  (non-commital)   Thinking poor concentration;delusional;distractable   Orientation person: oriented   Memory baseline memory   Insight poor   Judgement impaired   Eye Contact into space;at examiner   Affect blunted, flat   Mood mood is calm   Physical Appearance/Attire untidy;disheveled   Hygiene neglected grooming - unclean body, hair, teeth;other (see comment)  (Did shower)   Suicidality other (see comments)  (Denies)   1. Wish to be Dead No   2. Non-Specific Active Suicidal Thoughts  No   Self Injury other (see comment)  (No SIB)   Elopement   (No behavior)   Activity isolative;withdrawn;other (see comment)  (per careplan)   Speech other (see comments)  (odd comments)   Medication Sensitivity no stated side effects;no observed side effects   Psychomotor / Gait balanced;steady   Substance Withdrawal Interventions   Social and Therapeutic Interventions (Substance Withdrawal) encourage effective boundaries with peers;encourage socialization with peers;encourage participation in therapeutic groups and milieu activities   Overt Aggression Scale   Verbal Aggression 0   Aggression against Property 0   Auto-Aggression 0   Physical Aggression 0   Overt Aggression Total Score 0   Psycho Education   Type of Intervention 1:1 intervention   Response observes from a distance   Hours 0.5   Treatment Detail Triggers   Daily Care   Activity up ad joyce   Activities of Daily Living   Hygiene/Grooming prompts   Oral Hygiene prompts   Dress scrubs (behavioral health)   Laundry unable to complete   Room Organization independent   Activity   Activity Assistance Provided independent

## 2019-08-27 VITALS
TEMPERATURE: 97.7 F | OXYGEN SATURATION: 97 % | BODY MASS INDEX: 33.18 KG/M2 | HEIGHT: 69 IN | RESPIRATION RATE: 16 BRPM | DIASTOLIC BLOOD PRESSURE: 66 MMHG | WEIGHT: 224 LBS | HEART RATE: 92 BPM | SYSTOLIC BLOOD PRESSURE: 120 MMHG

## 2019-08-27 LAB — GLUCOSE BLDC GLUCOMTR-MCNC: 134 MG/DL (ref 70–99)

## 2019-08-27 PROCEDURE — 25000132 ZZH RX MED GY IP 250 OP 250 PS 637: Performed by: STUDENT IN AN ORGANIZED HEALTH CARE EDUCATION/TRAINING PROGRAM

## 2019-08-27 PROCEDURE — 25000132 ZZH RX MED GY IP 250 OP 250 PS 637: Performed by: PSYCHIATRY & NEUROLOGY

## 2019-08-27 PROCEDURE — 00000146 ZZHCL STATISTIC GLUCOSE BY METER IP

## 2019-08-27 PROCEDURE — 99238 HOSP IP/OBS DSCHRG MGMT 30/<: CPT | Performed by: PSYCHIATRY & NEUROLOGY

## 2019-08-27 RX ORDER — PHENOL 1.4 %
10 AEROSOL, SPRAY (ML) MUCOUS MEMBRANE
Qty: 30 TABLET | Refills: 1 | Status: SHIPPED | OUTPATIENT
Start: 2019-08-27

## 2019-08-27 RX ORDER — CHLORAL HYDRATE 500 MG
1 CAPSULE ORAL 2 TIMES DAILY
Qty: 60 CAPSULE | Refills: 1 | Status: SHIPPED | OUTPATIENT
Start: 2019-08-27

## 2019-08-27 RX ORDER — SIMVASTATIN 40 MG
40 TABLET ORAL AT BEDTIME
Qty: 30 TABLET | Refills: 0 | Status: SHIPPED | OUTPATIENT
Start: 2019-08-27 | End: 2019-08-27

## 2019-08-27 RX ORDER — FENOFIBRATE 160 MG/1
160 TABLET ORAL DAILY
Qty: 30 TABLET | Refills: 0 | Status: SHIPPED | OUTPATIENT
Start: 2019-08-27 | End: 2019-08-27

## 2019-08-27 RX ORDER — MEDROXYPROGESTERONE ACETATE 150 MG/ML
300 INJECTION, SUSPENSION INTRAMUSCULAR
Qty: 8 ML | Refills: 1 | OUTPATIENT
Start: 2019-08-30

## 2019-08-27 RX ORDER — ASPIRIN 81 MG
100 TABLET, DELAYED RELEASE (ENTERIC COATED) ORAL 2 TIMES DAILY
Qty: 60 TABLET | Refills: 1 | Status: SHIPPED | OUTPATIENT
Start: 2019-08-27 | End: 2019-08-27

## 2019-08-27 RX ORDER — SIMVASTATIN 40 MG
40 TABLET ORAL AT BEDTIME
Qty: 30 TABLET | Refills: 0 | Status: SHIPPED | OUTPATIENT
Start: 2019-08-27

## 2019-08-27 RX ORDER — FENOFIBRATE 160 MG/1
160 TABLET ORAL DAILY
Qty: 30 TABLET | Refills: 0 | Status: SHIPPED | OUTPATIENT
Start: 2019-08-27

## 2019-08-27 RX ORDER — CLOZAPINE 100 MG/1
200 TABLET ORAL EVERY MORNING
Qty: 60 TABLET | Refills: 1 | Status: SHIPPED | OUTPATIENT
Start: 2019-08-27

## 2019-08-27 RX ORDER — CLOZAPINE 50 MG/1
450 TABLET ORAL AT BEDTIME
Qty: 270 TABLET | Refills: 1 | Status: SHIPPED | OUTPATIENT
Start: 2019-08-27 | End: 2019-08-27

## 2019-08-27 RX ORDER — POLYETHYLENE GLYCOL 3350 17 G/17G
1 POWDER, FOR SOLUTION ORAL DAILY
Qty: 30 PACKET | Refills: 0 | Status: SHIPPED | OUTPATIENT
Start: 2019-08-27 | End: 2019-08-27

## 2019-08-27 RX ORDER — PROPRANOLOL HYDROCHLORIDE 80 MG/1
80 CAPSULE, EXTENDED RELEASE ORAL DAILY
Qty: 30 CAPSULE | Refills: 0 | Status: SHIPPED | OUTPATIENT
Start: 2019-08-27

## 2019-08-27 RX ORDER — ARIPIPRAZOLE 10 MG/1
10 TABLET ORAL DAILY
Qty: 30 TABLET | Refills: 1 | Status: SHIPPED | OUTPATIENT
Start: 2019-08-27 | End: 2019-08-27

## 2019-08-27 RX ORDER — CHLORAL HYDRATE 500 MG
1 CAPSULE ORAL 2 TIMES DAILY
Qty: 60 CAPSULE | Refills: 1 | Status: SHIPPED | OUTPATIENT
Start: 2019-08-27 | End: 2019-08-27

## 2019-08-27 RX ORDER — MULTIVITAMIN/IRON/FOLIC ACID 18MG-0.4MG
1 TABLET ORAL DAILY
Qty: 30 TABLET | Refills: 1 | Status: SHIPPED | OUTPATIENT
Start: 2019-08-27

## 2019-08-27 RX ORDER — NIACIN 500 MG/1
500 TABLET, EXTENDED RELEASE ORAL AT BEDTIME
Qty: 30 TABLET | Refills: 0 | Status: SHIPPED | OUTPATIENT
Start: 2019-08-27

## 2019-08-27 RX ORDER — NIACIN 500 MG/1
500 TABLET, EXTENDED RELEASE ORAL AT BEDTIME
Qty: 30 TABLET | Refills: 0 | Status: SHIPPED | OUTPATIENT
Start: 2019-08-27 | End: 2019-08-27

## 2019-08-27 RX ORDER — POLYETHYLENE GLYCOL 3350 17 G/17G
1 POWDER, FOR SOLUTION ORAL DAILY
Qty: 30 PACKET | Refills: 0 | Status: SHIPPED | OUTPATIENT
Start: 2019-08-27

## 2019-08-27 RX ORDER — CHOLECALCIFEROL (VITAMIN D3) 50 MCG
2000 TABLET ORAL DAILY
Qty: 30 TABLET | Refills: 1 | Status: SHIPPED | OUTPATIENT
Start: 2019-08-27

## 2019-08-27 RX ORDER — CLOZAPINE 100 MG/1
200 TABLET ORAL EVERY MORNING
Qty: 60 TABLET | Refills: 1 | Status: SHIPPED | OUTPATIENT
Start: 2019-08-27 | End: 2019-08-27

## 2019-08-27 RX ORDER — PANTOPRAZOLE SODIUM 20 MG/1
20 TABLET, DELAYED RELEASE ORAL DAILY
Qty: 30 TABLET | Refills: 0 | Status: SHIPPED | OUTPATIENT
Start: 2019-08-27

## 2019-08-27 RX ORDER — PANTOPRAZOLE SODIUM 20 MG/1
20 TABLET, DELAYED RELEASE ORAL DAILY
Qty: 30 TABLET | Refills: 0 | Status: SHIPPED | OUTPATIENT
Start: 2019-08-27 | End: 2019-08-27

## 2019-08-27 RX ORDER — ESCITALOPRAM OXALATE 20 MG/1
20 TABLET ORAL DAILY
Qty: 30 TABLET | Refills: 1 | Status: SHIPPED | OUTPATIENT
Start: 2019-08-27

## 2019-08-27 RX ORDER — PROPRANOLOL HYDROCHLORIDE 80 MG/1
80 CAPSULE, EXTENDED RELEASE ORAL DAILY
Qty: 30 CAPSULE | Refills: 0 | Status: SHIPPED | OUTPATIENT
Start: 2019-08-27 | End: 2019-08-27

## 2019-08-27 RX ORDER — DIVALPROEX SODIUM 500 MG/1
1500 TABLET, EXTENDED RELEASE ORAL 2 TIMES DAILY
Qty: 180 TABLET | Refills: 1 | Status: SHIPPED | OUTPATIENT
Start: 2019-08-27

## 2019-08-27 RX ORDER — MEDROXYPROGESTERONE ACETATE 150 MG/ML
300 INJECTION, SUSPENSION INTRAMUSCULAR
Qty: 8 ML | Refills: 1 | OUTPATIENT
Start: 2019-08-30 | End: 2019-08-27

## 2019-08-27 RX ORDER — CHOLECALCIFEROL (VITAMIN D3) 50 MCG
2000 TABLET ORAL DAILY
Qty: 30 TABLET | Refills: 1 | Status: SHIPPED | OUTPATIENT
Start: 2019-08-27 | End: 2019-08-27

## 2019-08-27 RX ORDER — DIVALPROEX SODIUM 500 MG/1
1500 TABLET, EXTENDED RELEASE ORAL 2 TIMES DAILY
Qty: 180 TABLET | Refills: 1 | Status: SHIPPED | OUTPATIENT
Start: 2019-08-27 | End: 2019-08-27

## 2019-08-27 RX ORDER — MULTIVITAMIN/IRON/FOLIC ACID 18MG-0.4MG
1 TABLET ORAL DAILY
Qty: 30 TABLET | Refills: 1 | Status: SHIPPED | OUTPATIENT
Start: 2019-08-27 | End: 2019-08-27

## 2019-08-27 RX ORDER — CLOZAPINE 50 MG/1
450 TABLET ORAL AT BEDTIME
Qty: 270 TABLET | Refills: 1 | Status: SHIPPED | OUTPATIENT
Start: 2019-08-27

## 2019-08-27 RX ORDER — PHENOL 1.4 %
10 AEROSOL, SPRAY (ML) MUCOUS MEMBRANE
Qty: 30 TABLET | Refills: 1 | Status: SHIPPED | OUTPATIENT
Start: 2019-08-27 | End: 2019-08-27

## 2019-08-27 RX ORDER — ASPIRIN 81 MG
100 TABLET, DELAYED RELEASE (ENTERIC COATED) ORAL 2 TIMES DAILY
Qty: 60 TABLET | Refills: 1 | Status: SHIPPED | OUTPATIENT
Start: 2019-08-27

## 2019-08-27 RX ORDER — ARIPIPRAZOLE 10 MG/1
10 TABLET ORAL DAILY
Qty: 30 TABLET | Refills: 1 | Status: SHIPPED | OUTPATIENT
Start: 2019-08-27

## 2019-08-27 RX ORDER — ESCITALOPRAM OXALATE 20 MG/1
20 TABLET ORAL DAILY
Qty: 30 TABLET | Refills: 1 | Status: SHIPPED | OUTPATIENT
Start: 2019-08-27 | End: 2019-08-27

## 2019-08-27 RX ADMIN — DOCUSATE SODIUM 100 MG: 100 CAPSULE, LIQUID FILLED ORAL at 09:04

## 2019-08-27 RX ADMIN — ARIPIPRAZOLE 10 MG: 10 TABLET ORAL at 09:04

## 2019-08-27 RX ADMIN — PROPRANOLOL HYDROCHLORIDE 80 MG: 80 CAPSULE, EXTENDED RELEASE ORAL at 09:04

## 2019-08-27 RX ADMIN — ESCITALOPRAM OXALATE 20 MG: 20 TABLET ORAL at 09:04

## 2019-08-27 RX ADMIN — PANTOPRAZOLE SODIUM 20 MG: 20 TABLET, DELAYED RELEASE ORAL at 09:04

## 2019-08-27 RX ADMIN — THERA TABS 1 TABLET: TAB at 09:03

## 2019-08-27 RX ADMIN — CLOZAPINE 200 MG: 100 TABLET ORAL at 09:04

## 2019-08-27 RX ADMIN — Medication 1 G: at 09:03

## 2019-08-27 RX ADMIN — METFORMIN HYDROCHLORIDE 1000 MG: 500 TABLET, FILM COATED ORAL at 09:04

## 2019-08-27 RX ADMIN — MELATONIN 2000 UNITS: at 09:04

## 2019-08-27 RX ADMIN — DIVALPROEX SODIUM 1500 MG: 500 TABLET, FILM COATED, EXTENDED RELEASE ORAL at 09:03

## 2019-08-27 RX ADMIN — POLYETHYLENE GLYCOL 3350 17 G: 17 POWDER, FOR SOLUTION ORAL at 09:04

## 2019-08-27 ASSESSMENT — ACTIVITIES OF DAILY LIVING (ADL)
DRESS: SCRUBS (BEHAVIORAL HEALTH);INDEPENDENT
HYGIENE/GROOMING: SHOWER;PROMPTS
LAUNDRY: UNABLE TO COMPLETE
ORAL_HYGIENE: PROMPTS
ORAL_HYGIENE: PROMPTS
HYGIENE/GROOMING: PROMPTS;SHOWER;HANDWASHING
DRESS: SCRUBS (BEHAVIORAL HEALTH)
LAUNDRY: UNABLE TO COMPLETE

## 2019-08-27 NOTE — PROGRESS NOTES
Patient discharging back to his group home. Dr. Agarwal called medications into pharmacy in [place], per  request. No medications being sent with patient. All belongings admitted with are returned to patient upon discharge. Patient denies SI/SIB.

## 2019-08-27 NOTE — PROGRESS NOTES
Patient up and ready to shower with prompts. Patient given toothbrush and toothpaste and brushed teeth with prompts. Encouraging patient mobilization and to come out of his room and socialize. Patient continues to decline this at this time. Will continue to encourage.

## 2019-08-27 NOTE — PROGRESS NOTES
CTC: Need specific orders regarding the changes - the Depakote.  reported that they need a statement from the doctor stating  there is no change in medication (unless there is a change) -- all medications to remain the same except for... Reported that sometimes when they get discharge package it has some previous medications and this creates complication. She reported that they do not have a nurse, so all orders need to be written.     also requested that the medication order be faxed to pt's pharmacy.     Plan: Pt to discharge between 2 and 3:00pm. The group home will be coming to take this patient.

## 2019-08-27 NOTE — PROGRESS NOTES
Pt was visible in this room, up and walking in room with prompting. Pt exited room and showered without incident. Pt was cooperative with various staff prompts, voiced needs appropriately.      08/27/19 1253   Behavioral Health   Hallucinations appears responding   Thinking distractable;poor concentration;delusional   Orientation person: oriented;place: oriented   Memory baseline memory   Insight poor   Judgement impaired   Eye Contact at examiner;into space   Affect blunted, flat   Mood mood is calm   Physical Appearance/Attire attire appropriate to age and situation   Hygiene well groomed   Suicidality other (see comments)  (denies)   1. Wish to be Dead No   2. Non-Specific Active Suicidal Thoughts  No   Self Injury other (see comment)  (denies)   Activity isolative;withdrawn   Speech circumstantial   Medication Sensitivity no observed side effects   Psychomotor / Gait balanced;steady   Activities of Daily Living   Hygiene/Grooming prompts;shower;handwashing   Oral Hygiene prompts   Dress scrubs (behavioral health);independent   Laundry unable to complete   Room Organization prompts   Activity   Activity Assistance Provided independent

## 2019-08-27 NOTE — DISCHARGE INSTRUCTIONS
Behavioral Discharge Planning and Instructions        Summary:  You were admitted to [facility] from your group home due to aggressive behavior toward staff and inappropriate sexual behavior toward staff.  While in the hospital, you were cooperative with treatment.  You were under the care of Dr Mosher (and Dr Gilmore)  Your symptoms improved so you are being discharged back to your group home.   [facility] [place], [place] (where you've resided for over the past 10 years.)     [facility] closed while you were here so a re-screen was done on August 13th, with Vy Holder at [phone].        Main Diagnoses: Schizoaffective disorder bipolar type  Mild intellectual disability  History of tardive dyskinesia  History of traumatic injury  History of major depressive disorder and anxiety         Follow-Up: Pt is discharging to:  [facility]  Phone: [phone].     Mental Health Follow-Up:   Bonnie Ann NP - Next appointment:  Tuesday September 17th at 3pm  [address] [place] [address]  Phone:   [phone]   Fx:  [phone]  ---------------------------    Your Primary Care doctor - Dr. Gretel Page, will be administering your Depo Provera injection on a regular basis.   This is at the [facility] in [place].    -----------------------------    [facility]    -----------------------------     Group home :  [facility] at [phone]    Or   [phone].       Your [place] :  Patsy Gabriel     Your legal guardian is your father:   Jose Adrian at [phone]     Attend all scheduled appointments with your outpatient providers. Call at least 24 hours in advance if you need to reschedule an appointment to ensure continued access to your outpatient providers.   Major Treatments, Procedures and Findings:  You were provided with: a psychiatric assessment, assessed for medical stability, medication evaluation and/or management and  group therapy     Symptoms to Report: feeling more aggressive, increased confusion, losing more sleep, mood getting worse or thoughts of suicide     Early warning signs can include: increased depression or anxiety sleep disturbances increased thoughts or behaviors of suicide or self-harm  increased unusual thinking, such as paranoia or hearing voices     Safety and Wellness:  Take all medicines as directed.  Make no changes unless your doctor suggests them.      Follow treatment recommendations.  Refrain from alcohol and non-prescribed drugs.  If there is a concern for safety, call 911.     Resources:   Crisis Intervention: 559.433.7522 or 421-085-6031 (TTY: 289.648.7433).  Call anytime for help.  National [place] [employer] Mental Illness (www.[place].willis.org): 582.135.8711 or 970-155-3322.  Suicide Awareness Voices of Education (SAVE) (www.save.org): 888-511-SAVE (7283)  Mental Health Association of [place] (www.mentalhealth.org): 741.730.9989 or 286-880-8611  [facility] and [facility]' [facility] Mobile Crisis Response Team (CRT):  [phone] or [phone]      The treatment team has appreciated the opportunity to work with you. If you have any questions or concerns our unit number is [phone].

## 2019-08-27 NOTE — PLAN OF CARE
"48 hour nursing assessment completed. Patient remains on SIO for impulsive lunging at staff. No aggression this shift. Patient showered during the day, with significant prompting. Clothing and linens changed. Patient remained in his room the entirety of the shift by his choice. Isolative, withdrawn. Patient tells writer he \"had a good day\". He states what made it a good day is \"watching TV\". Patient continues to draw pictures of \" people\". He states he \"never got to get \". Denies SI/SIB. At baseline. Awaiting funding. Medication compliant. Continue to monitor and assess.    "

## 2019-08-27 NOTE — PROGRESS NOTES
CTC: Spoke with pt's  - Emily ([phone]). She reported that [facility] ([phone]) has accepted this patient.    Writer spoke with [facility]. Staff reported that they are expecting the patient this afternoon. Staff will verify when they are able to take this patient and will call this writer back.    Writer spoke spoke with pt's father and informed him about pt's possible discharge, today. Provided father with Emily's phone ([facility]) in case he has any follow-up questions with them.    Plan: Will call pt's father to provide him exact discharge time, when received from Emily at [facility].

## 2019-08-28 RX ORDER — MENTHOL 0 G/G
POWDER TOPICAL
Qty: 28 G | Refills: 0 | Status: SHIPPED | OUTPATIENT
Start: 2019-08-28
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
## 2023-12-04 NOTE — PROGRESS NOTES
08/07/19 1500   Behavioral Health   Hallucinations denies / not responding to hallucinations   Thinking poor concentration   Orientation person: oriented;place: oriented   Memory baseline memory   Insight   (poor)   Judgement impaired   Affect blunted, flat;incongruent   Mood mood is calm   Physical Appearance/Attire attire appropriate to age and situation   Hygiene well groomed   Suicidality   (denies )   Self Injury   (denies)   Elopement   (denies )   Activity isolative;withdrawn     Pt had a good shift,had all his meals,took a shower and went back to his room to watch the Tv,denied all SI,SIB, concerns.   5

## 2024-05-06 ENCOUNTER — DOCUMENTATION ONLY (OUTPATIENT)
Dept: OTHER | Facility: CLINIC | Age: 59
End: 2024-05-06
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
## 2024-11-13 ENCOUNTER — DOCUMENTATION ONLY (OUTPATIENT)
Dept: OTHER | Facility: CLINIC | Age: 59
End: 2024-11-13

## 2025-04-23 NOTE — PLAN OF CARE
"Patient isolative to room all evening only coming out of room to receive meals. PT upon approach is calm and cooperative with assessments. Patient stated overall mood as good stating that he feels he needs more sleep and to watch less television. Staff attempted to encourage activity but patient denied, responding saying \"no thanks\". Patient was cooperative in talking with RN writer to complete admission profile. Patient through assessments was often distracted and often responding with off topic answers often referencing to \"spirits\" and \"going to heaven\". Patient denied SI/SIB but did report auditory hallucinations reporting that he hears \"spirits\" that tell him about heaven. Patient oriented to self and place but shows little insight into hospitalization unable to identify where he was living prior to admit and stating that he intends to \"go to another hospital, I live in hospitals\" after discharge. Patient was cooperative with blood glucose checks with a BG of 120 at 1700, though during the assessment he did get out of bed very fast and in an aggressive manner though redirected back to bed stating \"Im sorry\" with no further incident. PT accepting of HS medications, denying and not demonstrating side effects and receiving no PRN's this shift.   " Physical Therapy    Discharge Summary    Name: Avery Ortiz  MRN: [id number]  : 1974  Date: 25    Discharge Summary: PT       Has been seen in clinical physical therapy beginning on  for 3  visit (s); there has been no further visits attended. DC from PT